# Patient Record
Sex: FEMALE | Race: WHITE | NOT HISPANIC OR LATINO | Employment: FULL TIME | URBAN - METROPOLITAN AREA
[De-identification: names, ages, dates, MRNs, and addresses within clinical notes are randomized per-mention and may not be internally consistent; named-entity substitution may affect disease eponyms.]

---

## 2023-04-24 PROBLEM — M79.18 GLUTEAL PAIN: Status: ACTIVE | Noted: 2023-04-24

## 2023-04-24 PROBLEM — K58.2 IRRITABLE BOWEL SYNDROME WITH BOTH CONSTIPATION AND DIARRHEA: Status: ACTIVE | Noted: 2023-04-24

## 2023-04-24 PROBLEM — G43.829 MENSTRUAL MIGRAINE WITHOUT STATUS MIGRAINOSUS, NOT INTRACTABLE: Status: ACTIVE | Noted: 2023-04-24

## 2023-04-24 PROBLEM — M53.3 NONTRAUMATIC COCCYDYNIA: Status: ACTIVE | Noted: 2023-04-24

## 2023-04-24 PROBLEM — I10 ESSENTIAL HYPERTENSION: Status: ACTIVE | Noted: 2023-04-24

## 2023-04-24 PROBLEM — E03.9 PRIMARY HYPOTHYROIDISM: Status: ACTIVE | Noted: 2023-04-24

## 2023-04-24 PROBLEM — R10.2 FEMALE PELVIC PAIN: Status: ACTIVE | Noted: 2023-04-24

## 2023-04-24 PROBLEM — E88.810 METABOLIC SYNDROME X: Status: ACTIVE | Noted: 2023-04-24

## 2023-04-24 PROBLEM — E78.5 DYSLIPIDEMIA: Status: ACTIVE | Noted: 2023-04-24

## 2023-04-24 PROBLEM — M62.08 DIASTASIS OF RECTUS ABDOMINIS: Status: ACTIVE | Noted: 2023-04-24

## 2023-04-24 PROBLEM — N92.6 MISSED PERIOD: Status: ACTIVE | Noted: 2023-04-24

## 2023-04-24 RX ORDER — FLUTICASONE PROPIONATE 50 MCG
1 SPRAY, SUSPENSION (ML) NASAL DAILY
COMMUNITY
Start: 2021-12-13

## 2023-04-24 RX ORDER — LEVOTHYROXINE SODIUM 75 UG/1
1 TABLET ORAL DAILY
COMMUNITY
End: 2024-01-24 | Stop reason: SDUPTHER

## 2023-04-24 RX ORDER — HYDROCHLOROTHIAZIDE 12.5 MG/1
1 TABLET ORAL DAILY
COMMUNITY
Start: 2021-11-24 | End: 2024-01-22 | Stop reason: WASHOUT

## 2023-04-24 RX ORDER — BUPROPION HYDROCHLORIDE 150 MG/1
1 TABLET ORAL DAILY
COMMUNITY
Start: 2022-01-06 | End: 2024-01-22 | Stop reason: ALTCHOICE

## 2023-04-24 RX ORDER — DULAGLUTIDE 4.5 MG/.5ML
4.5 INJECTION, SOLUTION SUBCUTANEOUS
COMMUNITY
Start: 2022-03-07 | End: 2023-07-05 | Stop reason: SDUPTHER

## 2023-06-19 ENCOUNTER — TELEPHONE (OUTPATIENT)
Dept: PRIMARY CARE | Facility: CLINIC | Age: 38
End: 2023-06-19
Payer: COMMERCIAL

## 2023-06-19 NOTE — TELEPHONE ENCOUNTER
She called cause she drop a work out weight on her finger and would like to see if Dr. Porter would order a x-ray and the weight was 40 pounds to see if it broke and she can't make a fist. And questions please call her at [phone] if so could we send the order to [facility] on [facility] number is [phone].

## 2023-06-20 ENCOUNTER — OFFICE VISIT (OUTPATIENT)
Dept: PRIMARY CARE | Facility: CLINIC | Age: 38
End: 2023-06-20
Payer: COMMERCIAL

## 2023-06-20 VITALS
SYSTOLIC BLOOD PRESSURE: 110 MMHG | DIASTOLIC BLOOD PRESSURE: 70 MMHG | HEART RATE: 109 BPM | BODY MASS INDEX: 36.19 KG/M2 | WEIGHT: 212 LBS | RESPIRATION RATE: 16 BRPM | HEIGHT: 64 IN | OXYGEN SATURATION: 97 %

## 2023-06-20 DIAGNOSIS — S60.042A CONTUSION OF LEFT RING FINGER WITHOUT DAMAGE TO NAIL, INITIAL ENCOUNTER: Primary | ICD-10-CM

## 2023-06-20 PROCEDURE — 99212 OFFICE O/P EST SF 10 MIN: CPT | Performed by: INTERNAL MEDICINE

## 2023-06-20 PROCEDURE — 3074F SYST BP LT 130 MM HG: CPT | Performed by: INTERNAL MEDICINE

## 2023-06-20 PROCEDURE — 3078F DIAST BP <80 MM HG: CPT | Performed by: INTERNAL MEDICINE

## 2023-06-20 PROCEDURE — 1036F TOBACCO NON-USER: CPT | Performed by: INTERNAL MEDICINE

## 2023-06-20 RX ORDER — DROSPIRENONE AND ETHINYL ESTRADIOL 0.03MG-3MG
1 KIT ORAL DAILY
COMMUNITY
End: 2024-01-22 | Stop reason: ALTCHOICE

## 2023-06-20 ASSESSMENT — ENCOUNTER SYMPTOMS
OCCASIONAL FEELINGS OF UNSTEADINESS: 0
DEPRESSION: 0
LOSS OF SENSATION IN FEET: 0

## 2023-06-20 ASSESSMENT — PATIENT HEALTH QUESTIONNAIRE - PHQ9
2. FEELING DOWN, DEPRESSED OR HOPELESS: NOT AT ALL
SUM OF ALL RESPONSES TO PHQ9 QUESTIONS 1 AND 2: 0
1. LITTLE INTEREST OR PLEASURE IN DOING THINGS: NOT AT ALL

## 2023-06-22 ENCOUNTER — TELEPHONE (OUTPATIENT)
Dept: PRIMARY CARE | Facility: CLINIC | Age: 38
End: 2023-06-22
Payer: COMMERCIAL

## 2023-06-22 NOTE — TELEPHONE ENCOUNTER
----- Message from Abhilash Porter DO sent at 6/22/2023 10:55 AM EDT -----  No fracture on xr hand

## 2023-07-05 DIAGNOSIS — E66.09 CLASS 1 OBESITY DUE TO EXCESS CALORIES WITHOUT SERIOUS COMORBIDITY WITH BODY MASS INDEX (BMI) OF 30.0 TO 30.9 IN ADULT: Primary | ICD-10-CM

## 2023-07-05 RX ORDER — DULAGLUTIDE 4.5 MG/.5ML
4.5 INJECTION, SOLUTION SUBCUTANEOUS
Qty: 2 ML | Refills: 3 | Status: SHIPPED | OUTPATIENT
Start: 2023-07-05 | End: 2023-08-10

## 2023-07-10 ENCOUNTER — TELEPHONE (OUTPATIENT)
Dept: PRIMARY CARE | Facility: CLINIC | Age: 38
End: 2023-07-10
Payer: COMMERCIAL

## 2023-07-10 NOTE — TELEPHONE ENCOUNTER
Asked to speak to you---regarding prior authorization on Trulicity    Pharmacy told her it was denied , but in My Chart

## 2023-08-10 ENCOUNTER — OFFICE VISIT (OUTPATIENT)
Dept: PRIMARY CARE | Facility: CLINIC | Age: 38
End: 2023-08-10
Payer: COMMERCIAL

## 2023-08-10 VITALS
SYSTOLIC BLOOD PRESSURE: 124 MMHG | WEIGHT: 216 LBS | HEART RATE: 92 BPM | HEIGHT: 64 IN | DIASTOLIC BLOOD PRESSURE: 84 MMHG | BODY MASS INDEX: 36.88 KG/M2

## 2023-08-10 DIAGNOSIS — E03.9 PRIMARY HYPOTHYROIDISM: ICD-10-CM

## 2023-08-10 DIAGNOSIS — R73.02 IGT (IMPAIRED GLUCOSE TOLERANCE): Primary | ICD-10-CM

## 2023-08-10 DIAGNOSIS — M53.3 NONTRAUMATIC COCCYDYNIA: ICD-10-CM

## 2023-08-10 DIAGNOSIS — I10 ESSENTIAL HYPERTENSION: ICD-10-CM

## 2023-08-10 DIAGNOSIS — E78.5 DYSLIPIDEMIA: ICD-10-CM

## 2023-08-10 DIAGNOSIS — N95.1 MENOPAUSAL VASOMOTOR SYNDROME: ICD-10-CM

## 2023-08-10 PROBLEM — S60.042A: Status: RESOLVED | Noted: 2023-06-20 | Resolved: 2023-08-10

## 2023-08-10 PROBLEM — E66.812 CLASS 2 OBESITY DUE TO EXCESS CALORIES WITHOUT SERIOUS COMORBIDITY WITH BODY MASS INDEX (BMI) OF 37.0 TO 37.9 IN ADULT: Status: ACTIVE | Noted: 2023-08-10

## 2023-08-10 PROBLEM — E66.09 CLASS 2 OBESITY DUE TO EXCESS CALORIES WITHOUT SERIOUS COMORBIDITY WITH BODY MASS INDEX (BMI) OF 37.0 TO 37.9 IN ADULT: Status: ACTIVE | Noted: 2023-08-10

## 2023-08-10 PROCEDURE — 3008F BODY MASS INDEX DOCD: CPT | Performed by: INTERNAL MEDICINE

## 2023-08-10 PROCEDURE — 1036F TOBACCO NON-USER: CPT | Performed by: INTERNAL MEDICINE

## 2023-08-10 PROCEDURE — 99213 OFFICE O/P EST LOW 20 MIN: CPT | Performed by: INTERNAL MEDICINE

## 2023-08-10 PROCEDURE — 3079F DIAST BP 80-89 MM HG: CPT | Performed by: INTERNAL MEDICINE

## 2023-08-10 PROCEDURE — 3074F SYST BP LT 130 MM HG: CPT | Performed by: INTERNAL MEDICINE

## 2023-08-10 RX ORDER — CLONIDINE 0.1 MG/24H
1 PATCH, EXTENDED RELEASE TRANSDERMAL
Qty: 4 PATCH | Refills: 11 | Status: SHIPPED | OUTPATIENT
Start: 2023-08-10 | End: 2023-11-06 | Stop reason: SDUPTHER

## 2023-08-10 ASSESSMENT — ENCOUNTER SYMPTOMS: PALPITATIONS: 1

## 2023-08-10 NOTE — PROGRESS NOTES
"Subjective   Reason for Visit: Elizabeth Goss is an 37 y.o. female here for a fu visit.     Past Medical, Surgical, and Family History reviewed and updated in chart.    Reviewed all medications by prescribing practitioner or clinical pharmacist (such as prescriptions, OTCs, herbal therapies and supplements) and documented in the medical record.    Patient under care of new gynecologist placed on oral contraceptives to regulate cycles which have been irregular and erratic.  Patient having significant cyclical vasomotor symptoms with significant night sweats and vasomotor perimenopausal symptoms.  GLP-1 agonist therapy not covered by insurance for treatment of morbid obesity had history of impaired fasting sugars and hypothyroidism weight has been stable but unchanged patient continues to struggle with weight loss despite optimal dietary strategies and regular exercise last thyroid evaluation was in January IUD removed    Recently to see plastic surgeon at [facility] for ongoing coccydynia with significant scar tissue causing discomfort had needle localization done of the coccyx after removal with temporary relief of discomfort completed course of pelvic pain rehabilitation        Patient Care Team:  Abhilash Porter,  as PCP - General     Review of Systems   Cardiovascular:  Positive for palpitations.   Endocrine: Positive for heat intolerance.   Genitourinary:  Positive for menstrual problem, pelvic pain and vaginal bleeding.       Objective   Vitals:  /84 (BP Location: Left arm, Patient Position: Sitting)   Pulse 92   Ht 1.626 m (5' 4\")   Wt 98 kg (216 lb)   BMI 37.08 kg/m²       Physical Exam  Vitals and nursing note reviewed.   Constitutional:       General: She is not in acute distress.     Appearance: Normal appearance. She is well-developed. She is obese. She is not toxic-appearing.   HENT:      Head: Normocephalic and atraumatic.      Right Ear: Tympanic membrane and external ear " normal.      Left Ear: Tympanic membrane and external ear normal.      Nose: Nose normal.      Mouth/Throat:      Mouth: Mucous membranes are moist.      Pharynx: Oropharynx is clear. No oropharyngeal exudate or posterior oropharyngeal erythema.      Tonsils: No tonsillar exudate. 2+ on the right. 2+ on the left.   Eyes:      Extraocular Movements: Extraocular movements intact.      Conjunctiva/sclera: Conjunctivae normal.   Cardiovascular:      Rate and Rhythm: Normal rate and regular rhythm.      Pulses: Normal pulses.      Heart sounds: Normal heart sounds. No murmur heard.  Pulmonary:      Effort: Pulmonary effort is normal.      Breath sounds: Normal breath sounds.   Abdominal:      General: Abdomen is flat. Bowel sounds are normal.      Palpations: Abdomen is soft.   Musculoskeletal:      Cervical back: Neck supple.   Lymphadenopathy:      Cervical: No cervical adenopathy.   Skin:     General: Skin is warm and dry.      Findings: No rash.   Neurological:      Mental Status: She is alert. Mental status is at baseline.   Psychiatric:         Mood and Affect: Mood normal.         Behavior: Behavior normal.         Thought Content: Thought content normal.         Judgment: Judgment normal.         Assessment/Plan   Problem List Items Addressed This Visit       Dyslipidemia    Current Assessment & Plan     Reevaluate lipid profile in the setting of oral contraception particularly pain attention to triglycerides         Essential hypertension    Current Assessment & Plan     Continue hydrochlorothiazide 12.5 mg daily reevaluate next visit         Nontraumatic coccydynia    Current Assessment & Plan     Following with plastic surgery and [facility]         Primary hypothyroidism    Current Assessment & Plan     Continue levothyroxine 75 mcg daily last evaluated clinically euthyroid in January repeat levels for next visit         Relevant Orders    Tsh With Reflex To Free T4 If Abnormal    Follow Up In [facility] - PCP - Established    Menopausal vasomotor syndrome    Current Assessment & Plan     Suggestive of insulin resistance affecting menstrual cycle may be of some benefit to reinitiating therapy with metformin we will check blood work patient experiencing cyclical vasomotor hot flashes and palpitations we will start trial of clonidine patch 0.1 mg apply weekly will reevaluate when she returns response to therapy         Relevant Medications    cloNIDine (Catapres-TTS) 0.1 mg/24 hr patch    Other Relevant Orders    Follow Up In [facility] - PCP - Established     Other Visit Diagnoses       IGT (impaired glucose tolerance)    -  Primary    Relevant Orders    Comprehensive Metabolic Panel    Hemoglobin A1C    Lipid Panel    Hepatitis C antibody    Follow Up In [facility] - PCP - Established    Follow Up In [facility] - PCP - Established

## 2023-08-10 NOTE — PROGRESS NOTES
"Subjective   Patient ID: Elizabeth Goss is a 37 y.o. female who presents for Follow-up.    HPI     Review of Systems    Objective   /84 (BP Location: Left arm, Patient Position: Sitting)   Pulse 92   Ht 1.626 m (5' 4\")   Wt 98 kg (216 lb)   BMI 37.08 kg/m²     Physical Exam    Assessment/Plan          "

## 2023-08-10 NOTE — ASSESSMENT & PLAN NOTE
Reevaluate lipid profile in the setting of oral contraception particularly pain attention to triglycerides

## 2023-08-10 NOTE — ASSESSMENT & PLAN NOTE
Continue work at strategies to improve weight loss consistency in the absence of GLP-1 agonist therapy which did help with impaired fasting sugars and weight loss not covered by her insurance will reevaluate cardiometabolic profile with hemoglobin A1c off Trulicity will look at other strategies to combat weight gain

## 2023-08-10 NOTE — ASSESSMENT & PLAN NOTE
Continue levothyroxine 75 mcg daily last evaluated clinically euthyroid in January repeat levels for next visit

## 2023-08-10 NOTE — ASSESSMENT & PLAN NOTE
Suggestive of insulin resistance affecting menstrual cycle may be of some benefit to reinitiating therapy with metformin we will check blood work patient experiencing cyclical vasomotor hot flashes and palpitations we will start trial of clonidine patch 0.1 mg apply weekly will reevaluate when she returns response to therapy

## 2023-11-06 DIAGNOSIS — N95.1 MENOPAUSAL VASOMOTOR SYNDROME: ICD-10-CM

## 2023-11-06 RX ORDER — CLONIDINE 0.1 MG/24H
1 PATCH, EXTENDED RELEASE TRANSDERMAL
Qty: 12 PATCH | Refills: 3 | Status: SHIPPED | OUTPATIENT
Start: 2023-11-06 | End: 2024-01-22 | Stop reason: WASHOUT

## 2024-01-22 ENCOUNTER — OFFICE VISIT (OUTPATIENT)
Dept: PRIMARY CARE | Facility: CLINIC | Age: 39
End: 2024-01-22
Payer: COMMERCIAL

## 2024-01-22 VITALS
SYSTOLIC BLOOD PRESSURE: 122 MMHG | DIASTOLIC BLOOD PRESSURE: 70 MMHG | WEIGHT: 215 LBS | HEART RATE: 78 BPM | HEIGHT: 64 IN | BODY MASS INDEX: 36.7 KG/M2

## 2024-01-22 DIAGNOSIS — E66.09 CLASS 2 OBESITY DUE TO EXCESS CALORIES WITHOUT SERIOUS COMORBIDITY WITH BODY MASS INDEX (BMI) OF 37.0 TO 37.9 IN ADULT: ICD-10-CM

## 2024-01-22 DIAGNOSIS — R73.02 IGT (IMPAIRED GLUCOSE TOLERANCE): ICD-10-CM

## 2024-01-22 DIAGNOSIS — E78.5 DYSLIPIDEMIA: ICD-10-CM

## 2024-01-22 DIAGNOSIS — N95.1 MENOPAUSAL VASOMOTOR SYNDROME: Primary | ICD-10-CM

## 2024-01-22 DIAGNOSIS — E03.9 PRIMARY HYPOTHYROIDISM: ICD-10-CM

## 2024-01-22 DIAGNOSIS — I10 ESSENTIAL HYPERTENSION: ICD-10-CM

## 2024-01-22 DIAGNOSIS — Z12.31 SCREENING MAMMOGRAM FOR BREAST CANCER: ICD-10-CM

## 2024-01-22 DIAGNOSIS — E66.09 CLASS 2 OBESITY DUE TO EXCESS CALORIES WITHOUT SERIOUS COMORBIDITY WITH BODY MASS INDEX (BMI) OF 36.0 TO 36.9 IN ADULT: ICD-10-CM

## 2024-01-22 PROBLEM — N92.6 MISSED PERIOD: Status: RESOLVED | Noted: 2023-04-24 | Resolved: 2024-01-22

## 2024-01-22 PROCEDURE — 1036F TOBACCO NON-USER: CPT | Performed by: INTERNAL MEDICINE

## 2024-01-22 PROCEDURE — 3078F DIAST BP <80 MM HG: CPT | Performed by: INTERNAL MEDICINE

## 2024-01-22 PROCEDURE — 99213 OFFICE O/P EST LOW 20 MIN: CPT | Performed by: INTERNAL MEDICINE

## 2024-01-22 PROCEDURE — 3074F SYST BP LT 130 MM HG: CPT | Performed by: INTERNAL MEDICINE

## 2024-01-22 PROCEDURE — 3008F BODY MASS INDEX DOCD: CPT | Performed by: INTERNAL MEDICINE

## 2024-01-22 RX ORDER — CLONIDINE HYDROCHLORIDE 0.1 MG/1
0.1 TABLET ORAL 2 TIMES DAILY
Qty: 180 TABLET | Refills: 3 | Status: SHIPPED | OUTPATIENT
Start: 2024-01-22 | End: 2025-01-21

## 2024-01-22 ASSESSMENT — ENCOUNTER SYMPTOMS
LOSS OF SENSATION IN FEET: 0
OCCASIONAL FEELINGS OF UNSTEADINESS: 0
DEPRESSION: 0

## 2024-01-22 ASSESSMENT — COLUMBIA-SUICIDE SEVERITY RATING SCALE - C-SSRS
2. HAVE YOU ACTUALLY HAD ANY THOUGHTS OF KILLING YOURSELF?: NO
1. IN THE PAST MONTH, HAVE YOU WISHED YOU WERE DEAD OR WISHED YOU COULD GO TO SLEEP AND NOT WAKE UP?: NO
6. HAVE YOU EVER DONE ANYTHING, STARTED TO DO ANYTHING, OR PREPARED TO DO ANYTHING TO END YOUR LIFE?: NO

## 2024-01-22 ASSESSMENT — ANXIETY QUESTIONNAIRES
1. FEELING NERVOUS, ANXIOUS, OR ON EDGE: NOT AT ALL
3. WORRYING TOO MUCH ABOUT DIFFERENT THINGS: NOT AT ALL
GAD7 TOTAL SCORE: 0
7. FEELING AFRAID AS IF SOMETHING AWFUL MIGHT HAPPEN: NOT AT ALL
6. BECOMING EASILY ANNOYED OR IRRITABLE: NOT AT ALL
2. NOT BEING ABLE TO STOP OR CONTROL WORRYING: NOT AT ALL
IF YOU CHECKED OFF ANY PROBLEMS ON THIS QUESTIONNAIRE, HOW DIFFICULT HAVE THESE PROBLEMS MADE IT FOR YOU TO DO YOUR WORK, TAKE CARE OF THINGS AT HOME, OR GET ALONG WITH OTHER PEOPLE: NOT DIFFICULT AT ALL
5. BEING SO RESTLESS THAT IT IS HARD TO SIT STILL: NOT AT ALL
4. TROUBLE RELAXING: NOT AT ALL

## 2024-01-22 ASSESSMENT — PATIENT HEALTH QUESTIONNAIRE - PHQ9
1. LITTLE INTEREST OR PLEASURE IN DOING THINGS: NOT AT ALL
SUM OF ALL RESPONSES TO PHQ9 QUESTIONS 1 AND 2: 0
2. FEELING DOWN, DEPRESSED OR HOPELESS: NOT AT ALL

## 2024-01-22 NOTE — PROGRESS NOTES
"Subjective   Reason for Visit: Elizabeth Goss is an 38 y.o. female here for a annual  visit.     Past Medical, Surgical, and Family History reviewed and updated in chart.    Reviewed all medications by prescribing practitioner or clinical pharmacist (such as prescriptions, OTCs, herbal therapies and supplements) and documented in the medical record.    HPI    Patient Care Team:  Abhilash Porter DO as PCP - General     Review of Systems   All other systems reviewed and are negative.      Objective   Vitals:  /70   Pulse 78   Ht 1.626 m (5' 4\")   Wt 97.5 kg (215 lb)   BMI 36.90 kg/m²       Physical Exam  Vitals and nursing note reviewed.   Constitutional:       General: She is not in acute distress.     Appearance: Normal appearance. She is well-developed. She is obese. She is not toxic-appearing.   HENT:      Head: Normocephalic and atraumatic.      Right Ear: Tympanic membrane and external ear normal.      Left Ear: Tympanic membrane and external ear normal.      Nose: Nose normal.      Mouth/Throat:      Mouth: Mucous membranes are moist.      Pharynx: Oropharynx is clear. No oropharyngeal exudate or posterior oropharyngeal erythema.      Tonsils: No tonsillar exudate. 2+ on the right. 2+ on the left.   Eyes:      Extraocular Movements: Extraocular movements intact.      Conjunctiva/sclera: Conjunctivae normal.   Cardiovascular:      Rate and Rhythm: Normal rate and regular rhythm.      Pulses: Normal pulses.      Heart sounds: Normal heart sounds. No murmur heard.  Pulmonary:      Effort: Pulmonary effort is normal.      Breath sounds: Normal breath sounds.   Abdominal:      General: Abdomen is flat. Bowel sounds are normal.      Palpations: Abdomen is soft.   Musculoskeletal:      Cervical back: Neck supple.   Lymphadenopathy:      Cervical: No cervical adenopathy.   Skin:     General: Skin is warm and dry.      Findings: No rash.   Neurological:      Mental Status: She is alert. Mental status " is at baseline.   Psychiatric:         Mood and Affect: Mood normal.         Behavior: Behavior normal.         Thought Content: Thought content normal.         Judgment: Judgment normal.         Assessment/Plan   Problem List Items Addressed This Visit       Dyslipidemia    Current Assessment & Plan     Stable with improvement in overall lipid profile monitor triglycerides on oral contraceptive         Essential hypertension    Current Assessment & Plan     Blood pressure stable and improved will discontinue hydrochlorothiazide 12.5 mg daily and switch to clonidine 0.1 mg twice a day for symptomatic vasomotor symptoms associated with perimenopause causing elevated heart rates no improvement or side effects will consider rate controlling medication such as atenolol or calcium channel blocker reevaluate next visit         Primary hypothyroidism    Current Assessment & Plan     Clinically euthyroid on levothyroxine 75 mcg daily continue to monitor         Relevant Orders    Tsh With Reflex To Free T4 If Abnormal    Menopausal vasomotor syndrome - Primary    Current Assessment & Plan     Improvement seen with initiation of oral contraceptive will switch clonidine patch to regular dose clonidine and discontinue thiazide diuretic for symptoms exacerbated by exercise and reevaluate with next visit         Relevant Medications    cloNIDine (Catapres) 0.1 mg tablet    Other Relevant Orders    Follow Up In [facility] - PCP - Established    Class 2 obesity due to excess calories without serious comorbidity with body mass index (BMI) of 36.0 to 36.9 in adult    Current Assessment & Plan     Mild improvement with initiation of Contrave improving BMI patient continues with regular diet and exercise doing well no impairment of blood sugars at this time continue to monitor in 6 months         Relevant Medications    naltrexone-bupropion (Contrave) 8-90 mg ER tablet    Screening mammogram for breast cancer    Current  Assessment & Plan     Schedule screening mammogram         Relevant Orders    BI mammo bilateral screening tomosynthesis    Follow Up In [facility] - PCP - Established    IGT (impaired glucose tolerance)    Current Assessment & Plan     Favorable hemoglobin A1c of 5.5 fasting blood sugar less than 100 continue to monitor 6-month basis         Relevant Orders    Comprehensive Metabolic Panel    Hemoglobin A1C    Lipid Panel    ELIA    CBC and Auto Differential    Tsh With Reflex To Free T4 If Abnormal

## 2024-01-22 NOTE — PROGRESS NOTES
"Subjective   Patient ID: Elizabeth oGss is a 38 y.o. female who presents for Follow-up.    HPI     Review of Systems    Objective   Ht 1.626 m (5' 4\")   Wt 97.5 kg (215 lb)   BMI 36.90 kg/m²     Physical Exam    Assessment/Plan          "

## 2024-01-22 NOTE — ASSESSMENT & PLAN NOTE
Mild improvement with initiation of Contrave improving BMI patient continues with regular diet and exercise doing well no impairment of blood sugars at this time continue to monitor in 6 months

## 2024-01-22 NOTE — ASSESSMENT & PLAN NOTE
Improvement seen with initiation of oral contraceptive will switch clonidine patch to regular dose clonidine and discontinue thiazide diuretic for symptoms exacerbated by exercise and reevaluate with next visit

## 2024-01-22 NOTE — ASSESSMENT & PLAN NOTE
Favorable hemoglobin A1c of 5.5 fasting blood sugar less than 100 continue to monitor 6-month basis

## 2024-01-22 NOTE — ASSESSMENT & PLAN NOTE
Blood pressure stable and improved will discontinue hydrochlorothiazide 12.5 mg daily and switch to clonidine 0.1 mg twice a day for symptomatic vasomotor symptoms associated with perimenopause causing elevated heart rates no improvement or side effects will consider rate controlling medication such as atenolol or calcium channel blocker reevaluate next visit

## 2024-01-24 DIAGNOSIS — E03.9 PRIMARY HYPOTHYROIDISM: ICD-10-CM

## 2024-01-24 RX ORDER — LEVOTHYROXINE SODIUM 75 UG/1
75 TABLET ORAL DAILY
Qty: 90 TABLET | Refills: 3 | Status: SHIPPED | OUTPATIENT
Start: 2024-01-24

## 2024-02-08 ENCOUNTER — APPOINTMENT (OUTPATIENT)
Dept: OBSTETRICS AND GYNECOLOGY | Facility: CLINIC | Age: 39
End: 2024-02-08
Payer: COMMERCIAL

## 2024-03-20 ENCOUNTER — TELEPHONE (OUTPATIENT)
Dept: PRIMARY CARE | Facility: CLINIC | Age: 39
End: 2024-03-20
Payer: COMMERCIAL

## 2024-03-20 DIAGNOSIS — R92.8 ABNORMALITY OF RIGHT BREAST ON SCREENING MAMMOGRAPHY: Primary | ICD-10-CM

## 2024-03-20 NOTE — TELEPHONE ENCOUNTER
Mammogram received from [facility] Right breast asymmetry additional views with possible ultrasound are recommended.

## 2024-04-09 ENCOUNTER — HOSPITAL ENCOUNTER (OUTPATIENT)
Dept: RADIOLOGY | Facility: HOSPITAL | Age: 39
Discharge: HOME | End: 2024-04-09
Payer: COMMERCIAL

## 2024-04-09 DIAGNOSIS — R92.8 ABNORMALITY OF RIGHT BREAST ON SCREENING MAMMOGRAPHY: ICD-10-CM

## 2024-04-11 ENCOUNTER — HOSPITAL ENCOUNTER (OUTPATIENT)
Dept: RADIOLOGY | Facility: EXTERNAL LOCATION | Age: 39
Discharge: HOME | End: 2024-04-11

## 2024-04-11 ENCOUNTER — HOSPITAL ENCOUNTER (OUTPATIENT)
Dept: RADIOLOGY | Facility: HOSPITAL | Age: 39
Discharge: HOME | End: 2024-04-11
Payer: COMMERCIAL

## 2024-04-11 VITALS — WEIGHT: 210 LBS | HEIGHT: 64 IN | BODY MASS INDEX: 35.85 KG/M2

## 2024-04-11 DIAGNOSIS — R92.8 ABNORMALITY OF RIGHT BREAST ON SCREENING MAMMOGRAPHY: ICD-10-CM

## 2024-04-11 DIAGNOSIS — R92.1 CALCIFICATION OF RIGHT BREAST ON MAMMOGRAPHY: ICD-10-CM

## 2024-04-11 DIAGNOSIS — R92.8 ABNORMAL FINDINGS ON DIAGNOSTIC IMAGING OF BREAST: Primary | ICD-10-CM

## 2024-04-11 PROCEDURE — 77065 DX MAMMO INCL CAD UNI: CPT | Mod: RIGHT SIDE | Performed by: RADIOLOGY

## 2024-04-11 PROCEDURE — 76642 ULTRASOUND BREAST LIMITED: CPT | Mod: RIGHT SIDE | Performed by: RADIOLOGY

## 2024-04-11 PROCEDURE — 77061 BREAST TOMOSYNTHESIS UNI: CPT | Mod: RT

## 2024-04-11 PROCEDURE — 77061 BREAST TOMOSYNTHESIS UNI: CPT | Mod: RIGHT SIDE | Performed by: RADIOLOGY

## 2024-04-11 PROCEDURE — 76642 ULTRASOUND BREAST LIMITED: CPT | Mod: RT

## 2024-04-11 NOTE — PROGRESS NOTES
Patient follow up scheduling:  right breast stereotactic biopsy per provider recommendation, 4/17 2:15PM.

## 2024-04-11 NOTE — NURSING NOTE
After patient review of diagnostic results with Dr. Drew, support provided. Written literature regarding abnormal breast imaging and breast biopsy including what to expect before, during, and after the procedure reviewed with the patient. All questions answered. Patient stated she traveled approx. 40 minutes to [place] for visit, requesting same day provider and biopsy visits if possible. Able to accommodate patient preferences during scheduling. Patient selected Dr. Campos for surgical consultation 4/17 12:30PM with biopsy to follow 4/17 2:15PM. Information provided and reviewed to include provider information and how to reach me directly with questions or concerns before concluding visit.

## 2024-04-11 NOTE — Clinical Note
Your patient Elizabeth Goss seen for diagnostic breast imaging today will have final results available for your review shortly. I assisted with follow up scheduling and education review today. She will see Dr. Campos for consultation 4/17, with right stereotactic biopsy to follow the same afternoon.

## 2024-04-17 ENCOUNTER — HOSPITAL ENCOUNTER (OUTPATIENT)
Dept: RADIOLOGY | Facility: HOSPITAL | Age: 39
Discharge: HOME | End: 2024-04-17
Payer: COMMERCIAL

## 2024-04-17 ENCOUNTER — OFFICE VISIT (OUTPATIENT)
Dept: SURGERY | Facility: CLINIC | Age: 39
End: 2024-04-17
Payer: COMMERCIAL

## 2024-04-17 VITALS
WEIGHT: 219.2 LBS | OXYGEN SATURATION: 98 % | DIASTOLIC BLOOD PRESSURE: 94 MMHG | SYSTOLIC BLOOD PRESSURE: 138 MMHG | HEIGHT: 64 IN | HEART RATE: 105 BPM | BODY MASS INDEX: 37.42 KG/M2

## 2024-04-17 DIAGNOSIS — R92.1 BREAST CALCIFICATION, RIGHT: ICD-10-CM

## 2024-04-17 DIAGNOSIS — R92.8 ABNORMAL FINDINGS ON DIAGNOSTIC IMAGING OF BREAST: ICD-10-CM

## 2024-04-17 DIAGNOSIS — R92.1 CALCIFICATION OF RIGHT BREAST: Primary | ICD-10-CM

## 2024-04-17 DIAGNOSIS — R92.1 CALCIFICATION OF RIGHT BREAST ON MAMMOGRAPHY: ICD-10-CM

## 2024-04-17 PROCEDURE — 88305 TISSUE EXAM BY PATHOLOGIST: CPT | Performed by: PATHOLOGY

## 2024-04-17 PROCEDURE — 3008F BODY MASS INDEX DOCD: CPT | Performed by: SURGERY

## 2024-04-17 PROCEDURE — 3080F DIAST BP >= 90 MM HG: CPT | Performed by: SURGERY

## 2024-04-17 PROCEDURE — 99203 OFFICE O/P NEW LOW 30 MIN: CPT | Performed by: SURGERY

## 2024-04-17 PROCEDURE — 88342 IMHCHEM/IMCYTCHM 1ST ANTB: CPT | Performed by: PATHOLOGY

## 2024-04-17 PROCEDURE — 19081 BX BREAST 1ST LESION STRTCTC: CPT | Mod: RT

## 2024-04-17 PROCEDURE — 88360 TUMOR IMMUNOHISTOCHEM/MANUAL: CPT | Mod: TC,PORLAB | Performed by: SURGERY

## 2024-04-17 PROCEDURE — 88341 IMHCHEM/IMCYTCHM EA ADD ANTB: CPT | Performed by: PATHOLOGY

## 2024-04-17 PROCEDURE — 2720000007 HC OR 272 NO HCPCS

## 2024-04-17 PROCEDURE — 1036F TOBACCO NON-USER: CPT | Performed by: SURGERY

## 2024-04-17 PROCEDURE — 88360 TUMOR IMMUNOHISTOCHEM/MANUAL: CPT | Performed by: PATHOLOGY

## 2024-04-17 PROCEDURE — 3075F SYST BP GE 130 - 139MM HG: CPT | Performed by: SURGERY

## 2024-04-17 PROCEDURE — 77065 DX MAMMO INCL CAD UNI: CPT | Mod: RT

## 2024-04-17 ASSESSMENT — ENCOUNTER SYMPTOMS
SHORTNESS OF BREATH: 0
WOUND: 0
EYE REDNESS: 0
WEAKNESS: 0
NAUSEA: 0
VOMITING: 0
BRUISES/BLEEDS EASILY: 0
HEMATURIA: 0
FEVER: 0
DIARRHEA: 0
DYSURIA: 0
HEADACHES: 0
SPEECH DIFFICULTY: 0
MYALGIAS: 0
CONSTIPATION: 0
CHILLS: 0
COUGH: 0
EYE PAIN: 0
FLANK PAIN: 0
FREQUENCY: 0
ABDOMINAL PAIN: 0
ARTHRALGIAS: 0
FATIGUE: 0
CONFUSION: 0
POLYPHAGIA: 0
AGITATION: 0

## 2024-04-17 NOTE — PATIENT INSTRUCTIONS
1.  Keep your appointment for your mammogram-guided biopsy of your right breast calcifications on 4/17/2024 (today).  This biopsy is performed in the radiology department of [facility].  2.  If you have any problems with the biopsy site (bleeding or concern for infection), please contact Dr. Campos's office.  [phone]  3.  Follow-up in Dr. Campos's office on 4/24/2024 to review your biopsy results.

## 2024-04-17 NOTE — LETTER
"April 17, 2024     Abhilash Porter DO  [address]  [place] [address]    Patient: Elizabeth Goss   YOB: 1985   Date of Visit: 4/17/2024       Dear Dr. Abhilash Porter DO:    Thank you for referring Elizabeth Goss to me for evaluation. Below are my notes for this consultation.  If you have questions, please do not hesitate to call me. I look forward to following your patient along with you.       Sincerely,     Barbara Campos MD      CC: No Recipients  ______________________________________________________________________________________        GENERAL SURGERY OFFICE NOTE    Patient: Elizabeth Goss    Age: 38 y.o.   Gender: female    MRN: [id number]    PCP: Abhilash Porter DO        SUBJECTIVE     Chief Complaint  Follow-up (Patient is here to follow up on the results of her right breast ultrasound. Patient denies any redness or pain)       CHRISTY Johnson is a 38-year-old white female who I am seeing in consultation at the request of her PCP for evaluation of an abnormal right mammogram.  She had previously undergone a bilateral breast reduction in 2004.  She reports that the pathology of the reduction was benign.  She has not had any previous breast issues.  She was undergoing a screening mammogram and was found to have an asymmetry in the retroareolar region of the right breast.  Diagnostic mammogram did not show an area of asymmetry, but did show some retroareolar indeterminate calcifications.  A stereotactic biopsy of the calcifications has been recommended.  Of note, she currently is taking estrogen replacement to help with \"emotional/hormonal issues\".    Risk factors for breast cancer: 38-year-old white female; menarche at age [age]; first live birth at age 32; no previous breast biopsy; no first-degree relative with breast cancer.  She is premenopausal.  Paternal great aunt had breast cancer.  No family history of ovarian " cancer, pancreatic cancer or prostate cancer.  This gives her a 5-year Shahrzad score of 0.7% and a lifetime risk of 12.5% which puts her just above average risk category.    ROS  Review of Systems   Constitutional:  Negative for chills, fatigue and fever.   HENT:  Negative for congestion, ear pain and hearing loss.    Eyes:  Negative for pain and redness.   Respiratory:  Negative for cough and shortness of breath.    Cardiovascular:  Negative for chest pain and leg swelling.   Gastrointestinal:  Negative for abdominal pain, constipation, diarrhea, nausea and vomiting.   Endocrine: Negative for polyphagia.   Genitourinary:  Negative for dysuria, flank pain, frequency and hematuria.   Musculoskeletal:  Negative for arthralgias and myalgias.   Skin:  Negative for rash and wound.   Allergic/Immunologic: Negative for immunocompromised state.   Neurological:  Negative for speech difficulty, weakness and headaches.   Hematological:  Does not bruise/bleed easily.   Psychiatric/Behavioral:  Negative for agitation and confusion.           HISTORY     Past Medical History:   Diagnosis Date   • Body mass index (BMI) 36.0-36.9, adult 01/06/2022    Body mass index (BMI) of 36.0 to 36.9 in adult   • Encounter for general adult medical examination without abnormal findings 07/21/2022    Preventative health care   • Encounter for general adult medical examination without abnormal findings 11/24/2021    Preventative health care   • Encounter for immunization 01/12/2021    Encounter for immunization   • Lesion of sciatic nerve, left lower limb 08/18/2021    Piriformis syndrome of left side   • Lesion of sciatic nerve, left lower limb     Piriformis syndrome of left side   • Menstrual migraine, not intractable, without status migrainosus 08/07/2020    Menstrual migraine without status migrainosus, not intractable   • Morbid (severe) obesity due to excess calories (Multi) 01/10/2022    Class 2 severe obesity due to excess calories with  "serious comorbidity and body mass index (BMI) of 36.0 to 36.9 in adult   • Other hypersomnia 08/07/2020    Daytime hypersomnolence   • Other psychoactive substance use, 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 (Multi) 08/07/2020    Drug-induced insomnia   • Personal history of other diseases of the circulatory system 07/21/2022    History of essential hypertension   • Personal history of other endocrine, nutritional and metabolic disease 08/07/2020    History of hypothyroidism   • PONV (postoperative nausea and vomiting) 2005        Past Surgical History:   Procedure Laterality Date   • OTHER SURGICAL HISTORY  10/23/2019    Breast reduction   • OTHER SURGICAL HISTORY  08/07/2020    Tonsillectomy        No family history on file.     No Known Allergies     Social History     Tobacco Use   Smoking Status Never   Smokeless Tobacco Never        Social History     Substance and Sexual Activity   Alcohol Use Not Currently    Comment: rarely        HOME MEDICATIONS  Current Outpatient Medications   Medication Instructions   • cloNIDine (CATAPRES) 0.1 mg, oral, 2 times daily   • fluticasone (Flonase) 50 mcg/actuation nasal spray 1 spray, nasal, Daily   • levonorgestreL-ethinyl estrad (Aviane, Alesse, Lessina) 0.1 mg- 20 mcg tablet 1 tablet, oral, Daily   • levothyroxine (SYNTHROID, LEVOXYL) 75 mcg, oral, Daily   • naltrexone-bupropion (Contrave) 8-90 mg ER tablet 1 tablet, oral, 2 times daily          OBJECTIVE   Last Recorded Vitals.  Blood pressure (!) 138/94, pulse 105, height 1.626 m (5' 4\"), weight 99.4 kg (219 lb 3.2 oz), SpO2 98%.     PHYSICAL EXAM  Physical Exam   General: Well-developed, well-nourished and in no acute distress.  Head: Normocephalic. Atraumatic.  Neck/thyroid: Neck is supple.   Eyes: Pupils equal round and reactive to light. Conjunctiva normal.  ENMT: No masses or deformity of external nose. External ears without masses.  Respiratory/Chest:  Normal respiratory effort.  Breast: " Moderate to large sized breasts.  Symmetrical.  No palpable mass of either breast.  No expressible nipple drainage.  Scars consistent with previous breast reduction.  Some denser breast tissue throughout both breast without discernible mass.  Lymphatics: No palpable lymphadenopathy of the cervical, supraclavicular or axillary regions.  Cardiovascular: Regular rate and rhythm.   Abdomen: Soft, nontender, nondistended.   Musculoskeletal: Joints and limbs are grossly normal. Normal gait. Normal range of motion of major joints.  Neuro: Oriented to person, place and time. No obvious neurological deficit. Motor strength grossly normal.  Psych: Normal mood and affect.    RESULTS   Labs  No results found for this or any previous visit (from the past 24 hour(s)).    Radiology Resutls  MAMMO RIGHT DIAGNOSTIC TOMOSYNTHESIS; BI US BREAST LIMITED RIGHT;  4/11/2024 10:22 am; 4/11/2024 10:54 am      ACCESSION NUMBER(S):  [id number]; [id number]      ORDERING CLINICIAN:  CAYLA MOURA      INDICATION:  Right breast asymmetry.      COMPARISON:  Outside images [id number]      FINDINGS:  2D and tomosynthesis images were reviewed at 1 mm slice thickness.  Magnified views of the right breast were obtained. Right breast  focused ultrasound was performed.      RIGHT BREAST MAMMOGRAMS:      Density:  The breast tissue is heterogeneously dense, which may  obscure small masses.      The retroareolar region has multiple indeterminate calcifications  better visualized on the magnified views that appear to correspond to  areas of ducts.      No suspicious masses or calcifications are identified. The asymmetry  noted previously may have represented overlapping fibroglandular  tissue.      RIGHT BREAST FOCUSED ULTRASOUND:      The background echotexture is heterogeneous.      Imaging along the retroareolar region demonstrates no focal mass or  ductal dilatation.      Right axillary imaging demonstrates no mass or adenopathy.       IMPRESSION:  The retroareolar region of the right breast has indeterminate  calcifications for which surgical consultation and stereotactic  biopsy are recommended.      The patient was referred to our breast care navigator to arrange a  surgical consultation and biopsy.      The findings and recommendations were discussed with the patient at  the time of the exam.      BI-RADS CATEGORY:      BI-RADS Category:  4 Suspicious.  Recommendation:  Surgical Consultation and Biopsy.  Recommended Date:  Immediate.  Laterality:  Bilateral.      ASSESSMENT / PLAN   Diagnoses and all orders for this visit:  Calcification of right breast      Plan  April 2024: RIGHT; retroareolar calc    1.  The process of the stereotactic biopsy was reviewed with the patient.  She is scheduled for her biopsy today.  2.  She will follow-up with a virtual appointment on 4/24/2024 to review the results.  3.  Discussed that if breast cancer is diagnosed, that she would need to come off of her estrogen therapy.  4.  Reviewed that she has a moderate amount of scar tissue consistent with her previous breast reduction surgery.  The calcifications certainly could be related to the scarring from that surgery.  However, still recommend proceeding with the stereotactic biopsy as planned.      Barbara Campos MD, FACS  [facility] General Surgery  [address] [place] [address];   [address]; [address]  [place]  [address] [phone]

## 2024-04-17 NOTE — PROGRESS NOTES
"    GENERAL SURGERY OFFICE NOTE    Patient: Elizabeth Goss    Age: 38 y.o.   Gender: female    MRN: [id number]    PCP: Abhilash Porter, DO        SUBJECTIVE     Chief Complaint  Follow-up (Patient is here to follow up on the results of her right breast ultrasound. Patient denies any redness or pain)       CHRISTY Johnson is a 38-year-old white female who I am seeing in consultation at the request of her PCP for evaluation of an abnormal right mammogram.  She had previously undergone a bilateral breast reduction in 2004.  She reports that the pathology of the reduction was benign.  She has not had any previous breast issues.  She was undergoing a screening mammogram and was found to have an asymmetry in the retroareolar region of the right breast.  Diagnostic mammogram did not show an area of asymmetry, but did show some retroareolar indeterminate calcifications.  A stereotactic biopsy of the calcifications has been recommended.  Of note, she currently is taking estrogen replacement to help with \"emotional/hormonal issues\".    Risk factors for breast cancer: 38-year-old white female; menarche at age [age]; first live birth at age 32; no previous breast biopsy; no first-degree relative with breast cancer.  She is premenopausal.  Paternal great aunt had breast cancer.  No family history of ovarian cancer, pancreatic cancer or prostate cancer.  This gives her a 5-year Shahrzad score of 0.7% and a lifetime risk of 12.5% which puts her just above average risk category.    ROS  Review of Systems   Constitutional:  Negative for chills, fatigue and fever.   HENT:  Negative for congestion, ear pain and hearing loss.    Eyes:  Negative for pain and redness.   Respiratory:  Negative for cough and shortness of breath.    Cardiovascular:  Negative for chest pain and leg swelling.   Gastrointestinal:  Negative for abdominal pain, constipation, diarrhea, nausea and vomiting.   Endocrine: Negative for polyphagia.   Genitourinary:  " Negative for dysuria, flank pain, frequency and hematuria.   Musculoskeletal:  Negative for arthralgias and myalgias.   Skin:  Negative for rash and wound.   Allergic/Immunologic: Negative for immunocompromised state.   Neurological:  Negative for speech difficulty, weakness and headaches.   Hematological:  Does not bruise/bleed easily.   Psychiatric/Behavioral:  Negative for agitation and confusion.           HISTORY     Past Medical History:   Diagnosis Date    Body mass index (BMI) 36.0-36.9, adult 01/06/2022    Body mass index (BMI) of 36.0 to 36.9 in adult    Encounter for general adult medical examination without abnormal findings 07/21/2022    Preventative health care    Encounter for general adult medical examination without abnormal findings 11/24/2021    Preventative health care    Encounter for immunization 01/12/2021    Encounter for immunization    Lesion of sciatic nerve, left lower limb 08/18/2021    Piriformis syndrome of left side    Lesion of sciatic nerve, left lower limb     Piriformis syndrome of left side    Menstrual migraine, not intractable, without status migrainosus 08/07/2020    Menstrual migraine without status migrainosus, not intractable    Morbid (severe) obesity due to excess calories (Multi) 01/10/2022    Class 2 severe obesity due to excess calories with serious comorbidity and body mass index (BMI) of 36.0 to 36.9 in adult    Other hypersomnia 08/07/2020    Daytime hypersomnolence    Other psychoactive substance use, 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     No Known Allergies     Social History     Tobacco Use   Smoking Status Never   Smokeless Tobacco Never        Social History     Substance and Sexual Activity   Alcohol Use Not Currently    Comment: rarely        HOME MEDICATIONS  Current Outpatient Medications   Medication Instructions    cloNIDine (CATAPRES) 0.1 mg, oral, 2 times daily    fluticasone (Flonase) 50 mcg/actuation nasal spray 1 spray, nasal, Daily    levonorgestreL-ethinyl estrad (Aviane, Alesse, Lessina) 0.1 mg- 20 mcg tablet 1 tablet, oral, Daily    levothyroxine (SYNTHROID, LEVOXYL) 75 mcg, oral, Daily    naltrexone-bupropion (Contrave) 8-90 mg ER tablet 1 tablet, oral, 2 times daily          OBJECTIVE   Last Recorded Vitals.  Blood pressure (!) 138/94, pulse 105, height 1.626 m (5' 4\"), weight 99.4 kg (219 lb 3.2 oz), SpO2 98%.     PHYSICAL EXAM  Physical Exam   General: Well-developed, well-nourished and in no acute distress.  Head: Normocephalic. Atraumatic.  Neck/thyroid: Neck is supple.   Eyes: Pupils equal round and reactive to light. Conjunctiva normal.  ENMT: No masses or deformity of external nose. External ears without masses.  Respiratory/Chest:  Normal respiratory effort.  Breast: Moderate to large sized breasts.  Symmetrical.  No palpable mass of either breast.  No expressible nipple drainage.  Scars consistent with previous breast reduction.  Some denser breast tissue throughout both breast without discernible mass.  Lymphatics: No palpable lymphadenopathy of the cervical, supraclavicular or axillary regions.  Cardiovascular: Regular rate and rhythm.   Abdomen: Soft, nontender, nondistended.   Musculoskeletal: Joints and limbs are grossly normal. Normal gait. Normal range of motion of major joints.  Neuro: Oriented to person, place and time. No obvious neurological deficit. Motor strength grossly normal.  Psych: " Normal mood and affect.    RESULTS   Labs  No results found for this or any previous visit (from the past 24 hour(s)).    Radiology Resutls  MAMMO RIGHT DIAGNOSTIC TOMOSYNTHESIS; BI US BREAST LIMITED RIGHT;  4/11/2024 10:22 am; 4/11/2024 10:54 am      ACCESSION NUMBER(S):  [id number]; [id number]      ORDERING CLINICIAN:  CAYLA MOURA      INDICATION:  Right breast asymmetry.      COMPARISON:  Outside images [id number]      FINDINGS:  2D and tomosynthesis images were reviewed at 1 mm slice thickness.  Magnified views of the right breast were obtained. Right breast  focused ultrasound was performed.      RIGHT BREAST MAMMOGRAMS:      Density:  The breast tissue is heterogeneously dense, which may  obscure small masses.      The retroareolar region has multiple indeterminate calcifications  better visualized on the magnified views that appear to correspond to  areas of ducts.      No suspicious masses or calcifications are identified. The asymmetry  noted previously may have represented overlapping fibroglandular  tissue.      RIGHT BREAST FOCUSED ULTRASOUND:      The background echotexture is heterogeneous.      Imaging along the retroareolar region demonstrates no focal mass or  ductal dilatation.      Right axillary imaging demonstrates no mass or adenopathy.      IMPRESSION:  The retroareolar region of the right breast has indeterminate  calcifications for which surgical consultation and stereotactic  biopsy are recommended.      The patient was referred to our breast care navigator to arrange a  surgical consultation and biopsy.      The findings and recommendations were discussed with the patient at  the time of the exam.      BI-RADS CATEGORY:      BI-RADS Category:  4 Suspicious.  Recommendation:  Surgical Consultation and Biopsy.  Recommended Date:  Immediate.  Laterality:  Bilateral.      ASSESSMENT / PLAN   Diagnoses and all orders for this visit:  Calcification of right breast      Plan  April 2024:  RIGHT; retroareolar calc    1.  The process of the stereotactic biopsy was reviewed with the patient.  She is scheduled for her biopsy today.  2.  She will follow-up with a virtual appointment on 4/24/2024 to review the results.  3.  Discussed that if breast cancer is diagnosed, that she would need to come off of her estrogen therapy.  4.  Reviewed that she has a moderate amount of scar tissue consistent with her previous breast reduction surgery.  The calcifications certainly could be related to the scarring from that surgery.  However, still recommend proceeding with the stereotactic biopsy as planned.      Barbara Campos MD, FACS  [facility]  [address] [place] [address];   [address]  [place]  [address] [phone]

## 2024-04-24 ENCOUNTER — APPOINTMENT (OUTPATIENT)
Dept: SURGERY | Facility: CLINIC | Age: 39
End: 2024-04-24
Payer: COMMERCIAL

## 2024-04-24 LAB
LAB AP ASR DISCLAIMER: NORMAL
LABORATORY COMMENT REPORT: NORMAL
PATH REPORT.ADDENDUM SPEC: NORMAL
PATH REPORT.COMMENTS IMP SPEC: NORMAL
PATH REPORT.FINAL DX SPEC: NORMAL
PATH REPORT.GROSS SPEC: NORMAL
PATH REPORT.RELEVANT HX SPEC: NORMAL
PATH REPORT.TOTAL CANCER: NORMAL

## 2024-04-26 ENCOUNTER — TELEMEDICINE (OUTPATIENT)
Dept: SURGERY | Facility: CLINIC | Age: 39
End: 2024-04-26
Payer: COMMERCIAL

## 2024-04-26 VITALS — BODY MASS INDEX: 37.39 KG/M2 | WEIGHT: 219 LBS | HEIGHT: 64 IN

## 2024-04-26 DIAGNOSIS — D05.11 DUCTAL CARCINOMA IN SITU (DCIS) OF RIGHT BREAST: Primary | ICD-10-CM

## 2024-04-26 DIAGNOSIS — R92.1 CALCIFICATION OF RIGHT BREAST: ICD-10-CM

## 2024-04-26 PROCEDURE — 3008F BODY MASS INDEX DOCD: CPT | Performed by: SURGERY

## 2024-04-26 PROCEDURE — 99442 PR PHYS/QHP TELEPHONE EVALUATION 11-20 MIN: CPT | Performed by: SURGERY

## 2024-04-26 ASSESSMENT — ENCOUNTER SYMPTOMS
ABDOMINAL PAIN: 0
CONFUSION: 0
FEVER: 0
HEMATURIA: 0
HEADACHES: 0
VOMITING: 0
COUGH: 0
FLANK PAIN: 0
POLYPHAGIA: 0
WOUND: 0
DIARRHEA: 0
FREQUENCY: 0
ARTHRALGIAS: 0
EYE REDNESS: 0
NAUSEA: 0
CONSTIPATION: 0
SHORTNESS OF BREATH: 0
SPEECH DIFFICULTY: 0
CHILLS: 0
AGITATION: 0
DYSURIA: 0
WEAKNESS: 0
BRUISES/BLEEDS EASILY: 0
FATIGUE: 0
MYALGIAS: 0
EYE PAIN: 0

## 2024-04-26 NOTE — PATIENT INSTRUCTIONS
1.  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  Please see the education material enclosed.  2.  To better evaluate the extent of the DCIS, and check for involvement of your nipple/areola area, an MRI of your breast will be scheduled.  3.  A stat referral to genetics has been made.  Please consider genetic testing to see if you carry the breast cancer gene.  Results of this testing may influence your choice for type of surgical intervention.  4.  A referral has been made for consultation with plastic surgery to gather information regarding possible reconstructive surgery if a mastectomy surgery is chosen.  5.  Return to Dr. Campos after your MRI and plastic surgery consultation to discuss your results and start planning surgery for the right breast cancer.

## 2024-04-26 NOTE — PROGRESS NOTES
GENERAL SURGERY OFFICE NOTE    Patient: Elizabeth Goss    Age: 38 y.o.   Gender: female    MRN: [id number]    PCP: Abhilash Porter, DO        SUBJECTIVE     Chief Complaint  Follow-up (Patient is following up for a right breast biopsy. Patient states that she has some bruising. Patient states that she did not have any problems with the biopsy.)       HPI  The patient was interviewed via a phone. We spent approximately 19 minutes in the phone communication. The patient gave consent for this type of visit.  I performed this visit using real-time telehealth tools, including a telephone connection between Elizabeth at home and myself / Dr. Campos at the [facility] office.    Elizabeth returns to the office via phone visit for follow-up after undergoing a stereotactic biopsy for subareolar calcifications.  She states that she had some pain during the biopsy, and subsequently developed a moderate size bruise around the areola.  She does not feel that this is infected.  No redness.  No drainage.  She is here to review biopsy results.    Risk factors for breast cancer: 38-year-old white female; menarche at age [age]; first live birth at age 32; no previous breast biopsy; no first-degree relative with breast cancer.  She is premenopausal.  Paternal great aunt had breast cancer.  No family history of ovarian cancer, pancreatic cancer or prostate cancer.  This gives her a 5-year Shahrzad score of 0.7% and a lifetime risk of 12.5% which puts her just above average risk category.    ROS  Review of Systems   Constitutional:  Negative for chills, fatigue and fever.   HENT:  Negative for congestion, ear pain and hearing loss.    Eyes:  Negative for pain and redness.   Respiratory:  Negative for cough and shortness of breath.    Cardiovascular:  Negative for chest pain and leg swelling.   Gastrointestinal:  Negative for abdominal pain, constipation, diarrhea, nausea and vomiting.   Endocrine: Negative for polyphagia.    Genitourinary:  Negative for dysuria, flank pain, frequency and hematuria.   Musculoskeletal:  Negative for arthralgias and myalgias.   Skin:  Negative for rash and wound.   Allergic/Immunologic: Negative for immunocompromised state.   Neurological:  Negative for speech difficulty, weakness and headaches.   Hematological:  Does not bruise/bleed easily.   Psychiatric/Behavioral:  Negative for agitation and confusion.           HISTORY     Past Medical History:   Diagnosis Date    Body mass index (BMI) 36.0-36.9, adult 01/06/2022    Body mass index (BMI) of 36.0 to 36.9 in adult    Encounter for general adult medical examination without abnormal findings 07/21/2022    Preventative health care    Encounter for general adult medical examination without abnormal findings 11/24/2021    Preventative health care    Encounter for immunization 01/12/2021    Encounter for immunization    Lesion of sciatic nerve, left lower limb 08/18/2021    Piriformis syndrome of left side    Lesion of sciatic nerve, left lower limb     Piriformis syndrome of left side    Menstrual migraine, not intractable, without status migrainosus 08/07/2020    Menstrual migraine without status migrainosus, not intractable    Morbid (severe) obesity due to excess calories (Multi) 01/10/2022    Class 2 severe obesity due to excess calories with serious comorbidity and body mass index (BMI) of 36.0 to 36.9 in adult    Other hypersomnia 08/07/2020    Daytime hypersomnolence    Other psychoactive substance use, 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     No Known Allergies     Social History     Tobacco Use   Smoking Status Never   Smokeless Tobacco Never        Social History     Substance and Sexual Activity   Alcohol Use Not Currently    Comment: rarely        HOME MEDICATIONS  Current Outpatient Medications   Medication Instructions    cloNIDine (CATAPRES) 0.1 mg, oral, 2 times daily    fluticasone (Flonase) 50 mcg/actuation nasal spray 1 spray, nasal, Daily    levonorgestreL-ethinyl estrad (Aviane, Alesse, Lessina) 0.1 mg- 20 mcg tablet 1 tablet, oral, Daily    levothyroxine (SYNTHROID, LEVOXYL) 75 mcg, oral, Daily    naltrexone-bupropion (Contrave) 8-90 mg ER tablet 1 tablet, oral, 2 times daily          OBJECTIVE   Last Recorded Vitals.  Height 1.626 m (5' 4\"), weight 99.3 kg (219 lb).     PHYSICAL EXAM  Physical Exam   Previous exam:  General: Well-developed, well-nourished and in no acute distress.  Head: Normocephalic. Atraumatic.  Neck/thyroid: Neck is supple.   Eyes: Pupils equal round and reactive to light. Conjunctiva normal.  ENMT: No masses or deformity of external nose. External ears without masses.  Respiratory/Chest:  Normal respiratory effort.  Breast: Moderate to large sized breasts.  Symmetrical.  No palpable mass of either breast.  No expressible nipple drainage.  Scars consistent with previous breast reduction.  Some denser breast tissue throughout both breast without discernible mass.  Lymphatics: No palpable lymphadenopathy of the cervical, supraclavicular or axillary regions.  Cardiovascular: Regular rate and rhythm.   Abdomen: Soft, nontender, nondistended.   Musculoskeletal: Joints and limbs are grossly normal. Normal gait. Normal range of motion of major joints.  Neuro: Oriented to person, place and time. No obvious neurological deficit. Motor strength grossly normal.  Psych: Normal mood and " affect.    RESULTS   Labs  No results found for this or any previous visit (from the past 24 hour(s)).    Radiology Resutls  MAMMO RIGHT DIAGNOSTIC TOMOSYNTHESIS; BI US BREAST LIMITED RIGHT;  4/11/2024 10:22 am; 4/11/2024 10:54 am      ACCESSION NUMBER(S):  [id number]; [id number]      ORDERING CLINICIAN:  CAYLA MOURA      INDICATION:  Right breast asymmetry.      COMPARISON:  Outside images [id number]      FINDINGS:  2D and tomosynthesis images were reviewed at 1 mm slice thickness.  Magnified views of the right breast were obtained. Right breast  focused ultrasound was performed.      RIGHT BREAST MAMMOGRAMS:      Density:  The breast tissue is heterogeneously dense, which may  obscure small masses.      The retroareolar region has multiple indeterminate calcifications  better visualized on the magnified views that appear to correspond to  areas of ducts.      No suspicious masses or calcifications are identified. The asymmetry  noted previously may have represented overlapping fibroglandular  tissue.      RIGHT BREAST FOCUSED ULTRASOUND:      The background echotexture is heterogeneous.      Imaging along the retroareolar region demonstrates no focal mass or  ductal dilatation.      Right axillary imaging demonstrates no mass or adenopathy.      IMPRESSION:  The retroareolar region of the right breast has indeterminate  calcifications for which surgical consultation and stereotactic  biopsy are recommended.      The patient was referred to our breast care navigator to arrange a  surgical consultation and biopsy.      The findings and recommendations were discussed with the patient at  the time of the exam.      BI-RADS CATEGORY:      BI-RADS Category:  4 Suspicious.  Recommendation:  Surgical Consultation and Biopsy.  Recommended Date:  Immediate.  Laterality:  Bilateral.    Pathology  FINAL DIAGNOSIS   A. Right breast calcifications, stereotactic-guided core biopsy:  -- Ductal carcinoma in situ, cribriform  and papillary patterns, intermediate to high nuclear grade with focal associated microcalcifications, see note.      Note: Immunohistochemical stains for CK5/6 and estrogen receptor are consistent with an atypical proliferation. Multiple deeper levels were reviewed. ER will be reported in an addendum.          peb   Electronically signed by Zacarias Kumar DO on 4/24/2024 at 1123      [facility]   By the signature on this report, the individual or group listed as making the Final Interpretation/Diagnosis certifies that they have reviewed this case.    Comment  [facility]   Case was shown at Breast Case Review Conference via Zoom meeting with concordance    Addendum   Surgical/Block Number:        [id number] A1 and A4  Specimen Site:         Right breast calcifications  Specimen Type:       Core biopsy     Estrogen Receptor (clone SP1):                     Positive                                                                           Percentage with nuclear staining: >95%                                                                               ASSESSMENT / PLAN   Diagnoses and all orders for this visit:  Ductal carcinoma in situ (DCIS) of right breast  -     Referral to Plastic Surgery; Future  -     Referral to Genetics; Future  -     MR breast bilateral w contrast full protocol  Calcification of right breast        Plan  April 2024: RIGHT; retroareolar calc; DCIS; ER+    1.  Reviewed with the patient that the core needle biopsy of the calcifications is consistent with ductal carcinoma in situ.  Her calcifications are less than 1.5 cm behind the nipple/areolar region.  Unclear as to the extent of DCIS and the involvement of the nipple.  Will get a breast MRI to help evaluate the extent of the DCIS and determine if she can have a lumpectomy versus mastectomy surgery.  2.  Her surgical intervention could be more complex that she has already undergone breast reduction surgery previously.  This could make the blood  supply to the nipple/areolar region more tenuous.  3.  With her younger age, and recent diagnosis of DCIS, will refer to genetics for consideration of genetic testing.  Will make this consultation a stat consult as her genetic results could influence her decision regarding surgical intervention.  Reviewed that if she is BRCA positive she may need to consider a bilateral mastectomy.  4.  Will refer to plastic surgery for consultation regarding possible reconstructive surgery if the patient chooses to proceed with either a unilateral or bilateral mastectomy.  5.  Eventually she will need to be referred to medical oncology for endocrine therapy as her DCIS is ER positive.  If she chooses to have a bilateral mastectomy, and no invasive cancer in final pathology, endocrine therapy could be omitted.  6.  She noted that her  is anticipating traveling in June.  Will need to work around her schedule to make sure she has support at home perioperatively.  7.  Will have her return back to the office after her MRI and plastic surgery consultation to discuss results and start scheduling surgical intervention.      Barbara Campos MD, FACS   [facility]  [address] [place] [address];   [facility] [address]; [address]  [place]  [address] [phone]

## 2024-04-29 NOTE — PROGRESS NOTES
HISTORY OF PRESENT ILLNESS:  - Elizabeth Goss is a 38 y.o. female with a recent diagnosis of DCIS.  - she was referred to the Cancer Genetics Clinic at [facility] by her physician, Dr. Barbara Campos MD.   - Elizabeth is interested in genetic testing to clarify her personal risks for cancer, as well as the risks to her family members.   - The appointment today was conducted via video chat due to the current COVID-19 pandemic.     CANCER MEDICAL HISTORY:  Personal History of Cancer?    - She had previously undergone a bilateral breast reduction in 2004. She reports that the pathology of the reduction was benign. She has not had any previous breast issues. She was undergoing her first screening mammogram and was found to have an asymmetry in the retroareolar region of the right breast. Diagnostic mammogram did not show an area of asymmetry, but did show some retroareolar indeterminate calcifications. A stereotactic biopsy of the calcifications has been recommended.   Biopsy showed RIGHT; retroareolar calc; DCIS; ER+   Surgery planned for some time in June     Other ongoing health issues?  -Hypothyroidism; managed by primary care    PREVIOUS GENETIC TESTING?  -None reported     CANCER SCREENING HISTORY:   Breast cancer screening:  Mammograms?   First mammogram was in January 2024, at the time of finding the suspected asymmetry as described above.  Other imaging?   Some imaging around the time of breast reduction (elective); surgery in 2005. No concerns reported.  Clinical breast exams?  Annual with gynecology; no concerns reported.  Breast biopsies?  Pathology was ordered on tissue removed during breast reduction, which was reportedly benign.   Biopsy in April 2024 at the time of finding the current diagnosis   Gynecologic cancer screening:  Hysterectomy?  no  Oophorectomy?  no  Vaginal ultrasound?   Once in 2022 at time of pre-ablation workup; normal  CA-125?   None reported   PAP smear frequency/most  "recent?   Every 3 years; most recent in ; normal  Cervical biopsy in  at the time of pre-ablation workup which was normal   Any abnormal?   Yes, had colposcopy in 2012 due to history of abnormal PAPs  Gastrointestinal cancer screening:  Colonoscopy?   None reported   Endoscopy?  None reported   Other cancer screening:   Dermatology?   Annual skin checks; has had several precancerous moles, but no diagnoses of skin cancer.  Neuroma identified last year   Muscle that grew from scar tissue near tailbone around the site of mole removal    REPRODUCTIVE HISTORY:  # Children:   - 1  # Pregnancies:   - 2   Age first birth:   -32  Breast feeding?:   -A few weeks  Menarche (age):   -12 or 13  Menopause (age):   -Symptoms of menopause began around age 35; abnormal periods began at age 36  -Patient reports that she has low estrogen. She is still getting period, however, it is unclear if this is medically induced   -Has appointment with menopause clinic in a few weeks  OCP:    -About 15 years total    HRT:   -Per note from referring provider, Dr. Barbara Campos, \"she currently is taking estrogen replacement to help with \"emotional/hormonal issues\" \"    SOCIAL HISTORY:  - None reported     FAMILY HISTORY:  A 4-generation pedigree was obtained. The family history was significant for the following:  -Maternal grandmother (, 90s) was diagnosed with breast cancer in her 80s. She  from complications of dementia.  -Father (living, 73) has had many colon polyps; he goes for colonoscopies annually. He had a large portion of his colon removed in  (when he was in his late 50s) due to a large colon polyp, but did not require chemotherapy, radiation therapy, or any other treatment at that time.   -Paternal aunt (living, 60s) had breast cancer in her 40s.   -Paternal grandfather (, 55) was diagnosed with a chronic leukemia as a young adult. He had a heart attack with bypass surgery in his 50s that he did not " "recover well from. Around this time, brain cancer was also diagnosed (shortly before death).   -Paternal grandmother (, 90) had \"the same colon issues\" as the patient's father. She  from complications of old age.   ---Paternal great aunt (sister of paternal grandmother, , 60s)  from breast cancer that was initially diagnosed in her 50s or 60s.   Consanguinity and Ashkenazi Confucianist ancestry were denied. The patient's maternal side is of  descent, and the patient's paternal side is of [place] descent. The remainder of the family history was negative for kidney disease, heart disease, cancer, muscle disease, and blood disorders.    GENETIC COUNSELING/DISCUSSION  Ms. Elizabeth Goss is a 38 y.o. female with a a recent diagnosis of DCIS, and family history of breast cancer and colon polyps.  Based on her age alone, Elizabeth meets NCCN criteria for testing of the BRCA1 and BRCA2 genes. she is interested in testing, which is recommended, and was ordered today via the 34-gene CancerNext panel from [facility]. Our discussion is summarized below.    We reviewed genes and chromosomes, inherited forms of breast and ovarian cancer, and the BRCA1 and BRCA2 genes causing HBOC. We discussed that most cancers are not due to an inherited genetic susceptibility. However, in about 5-10% of families, there is an inherited genetic mutation that can make a person more susceptible to developing certain forms of cancer. Within these families, we often see multiple family members with cancer, occurring in multiple generations. In addition, earlier onset and bilateral cancers are suggestive of an inherited form of cancer. Finally, there is a clustering of certain types of cancer in these families, such as breast and ovarian cancer.    We discussed the BRCA1 and BRCA2 genes, which are two genes that have been linked to early-onset breast and/or ovarian cancer. Changes in these genes (sometimes " referred to as mutations) are inherited in a dominant pattern and confer up to an 87% lifetime risk for breast cancer. This is elevated compared to the general population risk of 10-12%. In addition, BRCA1 and BRCA2 mutation carriers have up to a 45% lifetime risk for ovarian cancer, which is elevated over the 2% general population risk. Mutation carriers who have already been diagnosed with cancer have an increased risk to develop a second, contralateral breast cancer. BRCA2 gene mutation carriers have an increased risk for male breast cancer, prostate cancer, melanoma and pancreatic cancer.    We discussed that there are multiple genes associated with increased breast cancer risk. Some genes, like the BRCA1 and BRCA2 genes, are considered highly penetrant breast cancer genes, meaning a mutation in the gene confers a high risk of breast cancer. Additionally, there are other intermediate (moderate risk) breast cancer genes. For some of the moderate risk genes, there is often limited information regarding the degree to which a mutation in the gene affects risk of different types of cancers. Additionally, for some of these moderate risk genes, the appropriate management for individuals who have a mutation in one of these genes is not always clear. Our knowledge about the cancer risks associated with mutations in these moderate risk genes is always growing, and we will likely be able to provide more comprehensive information in the future.     Most cancer predisposition genetic changes, such as those in BRCA1 and BRCA2, are inherited in an autosomal dominant fashion. This means that if an individual has a change in either of these genes, their siblings and children have a 50% chance of also having that gene change and a 50% chance of not having the gene change.     We reviewed the three results we can get back:  1. Positive- Identified a change in a cancer gene that confers an increased cancer risk. We will discuss  potential changes in management for her and her family based on the specific gene mutation found.  2. Negative- Clears her for a majority of predisposition cancer syndromes that we are aware of, but cannot clear her for any/all cancer predisposition risks. she would continue to be screened based on her personal and family history.  3. Variant of Uncertain Significance (VUS) - We discussed should an uncertain result come back that this would be treated like a negative result (i.e. no management recommendation will be made no familial variant testing) as the implications of this finding are currently unknown.    Lastly, we discussed the Genetic Information Non-discrimination Act (VITOR) of 2008. We discussed that per this federal law, employers (at companies with 15 employees or greater) and health insurance companies (barring  and other  insurances) are forbidden to ask for and use genetic information against another person. As such, health insurance companies cannot ask for genetic information and use findings affect coverage or rates. However, luxury insurances such as life insurance, long term care insurance, and/or private disability insurance companies are not forbidden against using genetic information when an individual takes out a new/additional policy in one of those areas. As such, for unaffected individuals it could be beneficial to explore/take out policies in luxury insurance areas PRIOR to undergoing genetic testing.    Elizabeth was counseled about hereditary cancer susceptibility including cancer risks, options for increased screening and/or risk reduction, genetic testing, and the implications for other family members. We discussed performing genetic testing in the context of a multi-gene panel test that looks at the BRCA1 and BRCA2, as well as moderate penetrant genes. she is interested in this approach, which is recommended and was ordered today via the 34-gene CancerNext panel from  [facility]:    Genes tested: APC, BERTIN, BARD1, BMPR1A, BRCA1, BRCA2, BRIP1, CDH1, CDK4, CDKN2A, CHEK2, DICER1, MLH1, MSH2, MSH6, MUTYH, NF1, NTHL1, PALB2, PMS2, PTEN, RAD51C, RAD51D, RECQL, SMARCA4, STK11 and TP53 (sequencing and deletion/duplication); AXIN2, HOXB13, MSH3, POLD1 and POLE (sequencing only); EPCAM and GREM1 deletion/duplication only).    I will request STAT processing due to pending surgery. Results should be available within about 2 weeks of sample collection, and Elizabeth will return to the Cancer Genetics Clinic to discuss her testing results. At that time, we will make recommendations for both Elizabeth and her family members in terms of cancer screening and/or cancer risk reduction options.       PLAN:  Elizabeth Goss elected to undergo genetic testing via a panel test that analyzes 34 genes associated with breast, ovarian and other cancer risks. Elizabeth elected to undergo genetic testing via the panel test described above. Oral consent for testing was obtained. An [employer] DNA/RNA blood test kit will be sent to the patient's home. She will then bring the test kit to a  outpatient blood draw lab to have their blood drawn for testing (using the test tubes provided in the kit). The sample will then be sent to [facility] for analysis. Results are typically available in 1-2 weeks when STAT processing is requested.     2. I will call the patient when results are available for review.  3. We remain available to Elizabeth and her family members at [phone] if any questions arise regarding information discussed at today's visit.    Time spent telecounselin min    Sincerely,   Jennifer Larios MS, [facility] [place]  Licensed Genetic Counselor     Reviewed by:  Dr. Toyin Rowe MD  Clinical

## 2024-05-01 ENCOUNTER — TELEMEDICINE CLINICAL SUPPORT (OUTPATIENT)
Dept: GENETICS | Facility: CLINIC | Age: 39
End: 2024-05-01
Payer: COMMERCIAL

## 2024-05-01 DIAGNOSIS — Z71.83 ENCOUNTER FOR NONPROCREATIVE GENETIC COUNSELING: ICD-10-CM

## 2024-05-01 DIAGNOSIS — Z80.3 FAMILY HISTORY OF MALIGNANT NEOPLASM OF BREAST: ICD-10-CM

## 2024-05-01 DIAGNOSIS — D05.11 DUCTAL CARCINOMA IN SITU (DCIS) OF RIGHT BREAST: ICD-10-CM

## 2024-05-01 PROCEDURE — 96040 PR MEDICAL GENETICS COUNSELING EACH 30 MINUTES: CPT

## 2024-05-16 ENCOUNTER — APPOINTMENT (OUTPATIENT)
Dept: GENETICS | Facility: CLINIC | Age: 39
End: 2024-05-16
Payer: COMMERCIAL

## 2024-05-17 ENCOUNTER — OFFICE VISIT (OUTPATIENT)
Dept: OBSTETRICS AND GYNECOLOGY | Facility: CLINIC | Age: 39
End: 2024-05-17
Payer: COMMERCIAL

## 2024-05-17 VITALS
DIASTOLIC BLOOD PRESSURE: 70 MMHG | SYSTOLIC BLOOD PRESSURE: 120 MMHG | WEIGHT: 224.2 LBS | HEIGHT: 64 IN | BODY MASS INDEX: 38.28 KG/M2

## 2024-05-17 DIAGNOSIS — N95.1 HOT FLASHES DUE TO MENOPAUSE: Primary | ICD-10-CM

## 2024-05-17 DIAGNOSIS — Z30.09 GENERAL COUNSELING AND ADVICE FOR CONTRACEPTIVE MANAGEMENT: ICD-10-CM

## 2024-05-17 PROCEDURE — 99214 OFFICE O/P EST MOD 30 MIN: CPT | Performed by: NURSE PRACTITIONER

## 2024-05-17 PROCEDURE — 1036F TOBACCO NON-USER: CPT | Performed by: NURSE PRACTITIONER

## 2024-05-17 PROCEDURE — 3008F BODY MASS INDEX DOCD: CPT | Performed by: NURSE PRACTITIONER

## 2024-05-17 PROCEDURE — 3078F DIAST BP <80 MM HG: CPT | Performed by: NURSE PRACTITIONER

## 2024-05-17 PROCEDURE — 3074F SYST BP LT 130 MM HG: CPT | Performed by: NURSE PRACTITIONER

## 2024-05-17 RX ORDER — DROSPIRENONE 4 MG/1
4 TABLET, FILM COATED ORAL DAILY
Qty: 30 TABLET | Refills: 11 | Status: SHIPPED | OUTPATIENT
Start: 2024-05-17

## 2024-05-17 RX ORDER — PAROXETINE 10 MG/1
10 TABLET, FILM COATED ORAL NIGHTLY
Qty: 30 TABLET | Refills: 11 | Status: SHIPPED | OUTPATIENT
Start: 2024-05-17 | End: 2025-05-17

## 2024-05-17 ASSESSMENT — ENCOUNTER SYMPTOMS
MUSCULOSKELETAL NEGATIVE: 0
ENDOCRINE NEGATIVE: 0
RESPIRATORY NEGATIVE: 0
GASTROINTESTINAL NEGATIVE: 0
CONSTITUTIONAL NEGATIVE: 0
ALLERGIC/IMMUNOLOGIC NEGATIVE: 0
NEUROLOGICAL NEGATIVE: 0
HEMATOLOGIC/LYMPHATIC NEGATIVE: 0
EYES NEGATIVE: 0
PSYCHIATRIC NEGATIVE: 0
CARDIOVASCULAR NEGATIVE: 0

## 2024-05-17 ASSESSMENT — PAIN SCALES - GENERAL: PAINLEVEL: 0-NO PAIN

## 2024-05-17 NOTE — PROGRESS NOTES
Subjective   Patient ID: Elizabeth Goss is a 38 y.o. female who presents for Menopause.  HPI  Concerns:   2021  had a vasectomy; she stopped her POP  Soon after she developed mood swings, VMS, heart palpitations  Developed really heavy menses  2/2022: started on progesterone until fall of 2022; menses stopped  Weight gain, previously was on weight loss medications: Trulicity until 2023  Started OCP end of 2023  Continued to have VMS and heart palpitations  H/o hypothyroidism    Diagnosed with breast cancer 3 weeks ago  Stage 0, ductal   Going through genetic testing and other testing to determine a plan of care    Menopausal age: perimenopausal     contraception:  OCP, vasectomy  Previously had Mirena IUD and she did not like it  use of OTC remedies: none  bioidenticals: none    Vaginal bleeding:  monthly with 5-6 days of moderate VB  VMS: yes  Sleep difficulties: yes  Mood changes: yes  Joint pain: yes  Brain fog/difficulty concentrating: yes    GSM: yes-dryness  are you sexually active: yes  dyspareunia: none  decrease in desire: yes  difficulty reaching orgasm:  yes  Urinary Incontinence: yes, occasional stress       Fractures: none  H/O abnormal pap: yes, 2009 colposcopy       Review of Systems    Objective   Physical Exam    Assessment/Plan   Diagnoses and all orders for this visit:  Hot flashes due to menopause  -     PARoxetine (PaxiL) 10 mg tablet; Take 1 tablet (10 mg) by mouth once daily at bedtime. Take one tablet by mouth at the bedtime  General counseling and advice for contraceptive management  -     drospirenone, contraceptive, (Slynd) 4 mg (28) tablet; Take 1 tablet by mouth once daily.       Non-hormonal prescription options for menopausal hot flashes include: Brisdelle, Veozah, SSRI, SNRI, gabapentin, or oxybutynin.  The 2 FDA approved options are Brisdelle and Veozah. The other options I mentioned have been studied and are safe and effective in menopausal women, but not officially FDA  "approved, which means they are used \"off label.\" Brisdelle is 7.5mg of paroxetine, however, the cost is usually much lower if instead of Brisdelle I prescribe 10mg paroxetine.  More common side effects may include:  Gabapentin, SSRI, and SNRI includes weight gain; however, the 10mg of paroxetine is less likely to cause weight gain  SSRI and SNRI may include a loss of libido and difficulty reaching orgasm  Gabapentin may include dizziness and drowsiness  Oxybutynin may include dry mouth    In studies, Veozah has not been shown to cause liver damage, but the FDA has recommended baseline testing and at 3, 6,and 9 months of liver function tests.     Decision for 10mg po paroxetine  Pt to finish OCP this month and then transition to Slynd which she was on previously; sample given today  Pt to keep me updated on the plan of care for her breast cancer  Will discuss efficacy of paroxetine in 4-6 weeks    Ward Pisano, KATY-CNP 05/17/24 10:29 AM   "

## 2024-05-21 ENCOUNTER — HOSPITAL ENCOUNTER (OUTPATIENT)
Dept: RADIOLOGY | Facility: HOSPITAL | Age: 39
Discharge: HOME | End: 2024-05-21
Payer: COMMERCIAL

## 2024-05-21 PROCEDURE — 77049 MRI BREAST C-+ W/CAD BI: CPT | Performed by: STUDENT IN AN ORGANIZED HEALTH CARE EDUCATION/TRAINING PROGRAM

## 2024-05-21 PROCEDURE — A9575 INJ GADOTERATE MEGLUMI 0.1ML: HCPCS | Performed by: SURGERY

## 2024-05-21 PROCEDURE — 77049 MRI BREAST C-+ W/CAD BI: CPT

## 2024-05-21 PROCEDURE — 2550000001 HC RX 255 CONTRASTS: Performed by: SURGERY

## 2024-05-21 RX ORDER — GADOTERATE MEGLUMINE 376.9 MG/ML
0.2 INJECTION INTRAVENOUS
Status: COMPLETED | OUTPATIENT
Start: 2024-05-21 | End: 2024-05-21

## 2024-05-21 RX ADMIN — GADOTERATE MEGLUMINE 19.5 ML: 376.9 INJECTION INTRAVENOUS at 07:55

## 2024-05-22 NOTE — RESULT ENCOUNTER NOTE
Reviewed results with patient.  Area of concern measures approximately 6 cm and does involve the base of the nipple.  She is considering a right mastectomy with sentinel lymph node biopsy and stage I reconstructive surgery for 12/25/2024 as she is going on vacation in mid July.  Genetic testing still pending.  She will keep her follow-up appointment with me next week

## 2024-05-23 ENCOUNTER — TELEPHONE (OUTPATIENT)
Dept: SURGERY | Facility: HOSPITAL | Age: 39
End: 2024-05-23
Payer: COMMERCIAL

## 2024-05-23 NOTE — TELEPHONE ENCOUNTER
----- Message from Bc Lamb MA sent at 5/22/2024  2:35 PM EDT -----  Regarding: FW: Plastic surgery appointment  Manisha will block 7/25/24 for Dr. Travis.  ----- Message -----  From: Bc Lamb MA  Sent: 5/22/2024   2:23 PM EDT  To: Bc Lamb MA  Subject: FW: Plastic surgery appointment                  I emailed Manisha as Dr. Travis's office phones were rules.  ----- Message -----  From: Barbara Campos MD  Sent: 5/22/2024  10:37 AM EDT  To: Bc Lamb MA  Subject: FW: Plastic surgery appointment                  I talked with Elizabeth regarding her MRI results.  The area of concern is larger on MRI (6 cm) than on mammogram.  The area of concern also seems to involve the base of the nipple.  She is considering having a skin sparing right mastectomy with sentinel lymph node biopsy and stage I reconstructive surgery.    1.  Contact Dr. Travis's office to see if he would be available on 7/25/2024 to do her surgery.  Let them know that we are still waiting on the genetic testing results before deciding on unilateral versus bilateral.  2.  She has an appointment with me on 5/29/2024 that she will keep to discuss surgical details.    ----- Message -----  From: Barbara Campos MD  Sent: 5/22/2024   8:00 AM EDT  To: Barbara Campos MD  Subject: RE: Plastic surgery appointment                  I talked with Elizabeth regarding her plastic surgery appointment.  She states that they discussed several different options.  Her breast MRI is scheduled for next Tuesday, 5/21/2024.  She did send off her genetic testing, and hopefully these results will come back in the next 1 to 2 weeks.  Depending on both of these results would determine which surgical intervention she would choose.  She does have a follow-up with me on 5/29/2024 and plastic surgery 6/6/2024 to finalize surgical scheduling.    ----- Message -----  From: Barbara Campos MD  Sent: 5/16/2024   8:00 AM EDT  To: Barbara Campos,  MD  Subject: FW: Plastic surgery appointment                  Follow up with patient after her plastic surgery consult.    ----- Message -----  From: Bc Lamb MA  Sent: 5/3/2024  12:43 PM EDT  To: Barbara Campos MD  Subject: FW: Plastic surgery appointment                  Patient is scheduled with Dr. Travis on 5/15/24 at 10:15.  ----- Message -----  From: Chan Castellanos  Sent: 4/29/2024  11:21 AM EDT  To: Bc Lamb MA  Subject: FW: Plastic surgery appointment                    ----- Message -----  From: Barbara Campos MD  Sent: 4/29/2024  11:14 AM EDT  To: Chan Castellanos  Subject: Plastic surgery appointment                      Please, let me know when her plastic surgery consult is scheduled so I can follow up with her afterwards.

## 2024-05-29 ENCOUNTER — OFFICE VISIT (OUTPATIENT)
Dept: SURGERY | Facility: CLINIC | Age: 39
End: 2024-05-29
Payer: COMMERCIAL

## 2024-05-29 VITALS
DIASTOLIC BLOOD PRESSURE: 88 MMHG | SYSTOLIC BLOOD PRESSURE: 129 MMHG | HEART RATE: 94 BPM | BODY MASS INDEX: 37.73 KG/M2 | HEIGHT: 64 IN | WEIGHT: 221 LBS | OXYGEN SATURATION: 96 %

## 2024-05-29 DIAGNOSIS — D05.11 DUCTAL CARCINOMA IN SITU (DCIS) OF RIGHT BREAST: Primary | ICD-10-CM

## 2024-05-29 DIAGNOSIS — R92.1 CALCIFICATION OF RIGHT BREAST: ICD-10-CM

## 2024-05-29 PROCEDURE — 3008F BODY MASS INDEX DOCD: CPT | Performed by: SURGERY

## 2024-05-29 PROCEDURE — 3079F DIAST BP 80-89 MM HG: CPT | Performed by: SURGERY

## 2024-05-29 PROCEDURE — 1036F TOBACCO NON-USER: CPT | Performed by: SURGERY

## 2024-05-29 PROCEDURE — 3074F SYST BP LT 130 MM HG: CPT | Performed by: SURGERY

## 2024-05-29 PROCEDURE — 99215 OFFICE O/P EST HI 40 MIN: CPT | Performed by: SURGERY

## 2024-05-29 ASSESSMENT — ENCOUNTER SYMPTOMS
CHILLS: 0
FEVER: 0
ARTHRALGIAS: 0
DYSURIA: 0
AGITATION: 0
NAUSEA: 0
HEADACHES: 0
ABDOMINAL PAIN: 0
HEMATURIA: 0
FREQUENCY: 0
CONSTIPATION: 0
POLYPHAGIA: 0
SPEECH DIFFICULTY: 0
CONFUSION: 0
SHORTNESS OF BREATH: 0
COUGH: 0
FLANK PAIN: 0
WEAKNESS: 0
DIARRHEA: 0
VOMITING: 0
WOUND: 0
FATIGUE: 0
MYALGIAS: 0
EYE PAIN: 0
EYE REDNESS: 0
BRUISES/BLEEDS EASILY: 0

## 2024-05-29 NOTE — PROGRESS NOTES
GENERAL SURGERY OFFICE NOTE    Patient: Elizabeth Goss    Age: 38 y.o.   Gender: female    MRN: [id number]    PCP: Abhilash Porter, DO        SUBJECTIVE     Chief Complaint  Follow-up (Patient is here for a breast follow up. Patient states she is not having any new or worsening symptoms. Patient would like to discuss her next steps in treatment .)       CHRISTY Johnson returns to the office for follow-up of her right breast cancer.  Since her last office appointment, she did have consultation with plastic surgery and her breast MRI. The area of concern is larger on MRI (6 cm) than on mammogram.  The area of concern also seems to involve the base of the nipple.  She is considering having a skin sparing right mastectomy with sentinel lymph node biopsy and stage I reconstructive surgery.  She is still awaiting her genetic results.    Original risk factors for breast cancer: 38-year-old white female; menarche at age [age]; first live birth at age 32; no previous breast biopsy; no first-degree relative with breast cancer.  She is premenopausal.  Paternal great aunt had breast cancer.  No family history of ovarian cancer, pancreatic cancer or prostate cancer.  This gives her a 5-year Shahrzad score of 0.7% and a lifetime risk of 12.5% which puts her just above average risk category.    ROS  Review of Systems   Constitutional:  Negative for chills, fatigue and fever.   HENT:  Negative for congestion, ear pain and hearing loss.    Eyes:  Negative for pain and redness.   Respiratory:  Negative for cough and shortness of breath.    Cardiovascular:  Negative for chest pain and leg swelling.   Gastrointestinal:  Negative for abdominal pain, constipation, diarrhea, nausea and vomiting.   Endocrine: Negative for polyphagia.   Genitourinary:  Negative for dysuria, flank pain, frequency and hematuria.   Musculoskeletal:  Negative for arthralgias and myalgias.   Skin:  Negative for rash and wound.   Allergic/Immunologic:  Negative for immunocompromised state.   Neurological:  Negative for speech difficulty, weakness and headaches.   Hematological:  Does not bruise/bleed easily.   Psychiatric/Behavioral:  Negative for agitation and confusion.           HISTORY     Past Medical History:   Diagnosis Date    Body mass index (BMI) 36.0-36.9, adult 01/06/2022    Body mass index (BMI) of 36.0 to 36.9 in adult    Encounter for general adult medical examination without abnormal findings 07/21/2022    Preventative health care    Encounter for general adult medical examination without abnormal findings 11/24/2021    Preventative health care    Encounter for immunization 01/12/2021    Encounter for immunization    Lesion of sciatic nerve, left lower limb 08/18/2021    Piriformis syndrome of left side    Lesion of sciatic nerve, left lower limb     Piriformis syndrome of left side    Menstrual migraine, not intractable, without status migrainosus 08/07/2020    Menstrual migraine without status migrainosus, not intractable    Morbid (severe) obesity due to excess calories (Multi) 01/10/2022    Class 2 severe obesity due to excess calories with serious comorbidity and body mass index (BMI) of 36.0 to 36.9 in adult    Other hypersomnia 08/07/2020    Daytime hypersomnolence    Other psychoactive substance use, 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 (Multi) 08/07/2020    Drug-induced insomnia    Personal history of other diseases of the circulatory system 07/21/2022    History of essential hypertension    Personal history of other endocrine, nutritional and metabolic disease 08/07/2020    History of hypothyroidism    PONV (postoperative nausea and vomiting) 2005        Past Surgical History:   Procedure Laterality Date    OTHER SURGICAL HISTORY  10/23/2019    Breast reduction    OTHER SURGICAL HISTORY  08/07/2020    Tonsillectomy        No family history on file.     No Known Allergies     Social History     Tobacco Use  "  Smoking Status Never   Smokeless Tobacco Never        Social History     Substance and Sexual Activity   Alcohol Use Not Currently    Comment: rarely        HOME MEDICATIONS  Current Outpatient Medications   Medication Instructions    cloNIDine (CATAPRES) 0.1 mg, oral, 2 times daily    drospirenone, contraceptive, (Slynd) 4 mg (28) tablet 1 tablet, oral, Daily    fluticasone (Flonase) 50 mcg/actuation nasal spray 1 spray, nasal, Daily    levothyroxine (SYNTHROID, LEVOXYL) 75 mcg, oral, Daily    PARoxetine (PAXIL) 10 mg, oral, Nightly, Take one tablet by mouth at the bedtime          OBJECTIVE   Last Recorded Vitals.  Blood pressure 129/88, pulse 94, height 1.626 m (5' 4\"), weight 100 kg (221 lb), last menstrual period 05/03/2024, SpO2 96%.     PHYSICAL EXAM  Physical Exam   Previous exam:  General: Well-developed, well-nourished and in no acute distress.  Head: Normocephalic. Atraumatic.  Neck/thyroid: Neck is supple.   Eyes: Pupils equal round and reactive to light. Conjunctiva normal.  ENMT: No masses or deformity of external nose. External ears without masses.  Respiratory/Chest:  Normal respiratory effort.  Breast: Moderate to large sized breasts.  Symmetrical.  No palpable mass of either breast.  No expressible nipple drainage.  Scars consistent with previous breast reduction.  Some denser breast tissue throughout both breast without discernible mass.  Lymphatics: No palpable lymphadenopathy of the cervical, supraclavicular or axillary regions.  Cardiovascular: Regular rate and rhythm.   Abdomen: Soft, nontender, nondistended.   Musculoskeletal: Joints and limbs are grossly normal. Normal gait. Normal range of motion of major joints.  Neuro: Oriented to person, place and time. No obvious neurological deficit. Motor strength grossly normal.  Psych: Normal mood and affect.    RESULTS   Labs  No results found for this or any previous visit (from the past 24 hour(s)).    Radiology Resutls  MR BREAST BILATERAL " WITH CONTRAST FULL PROTOCOL;  5/21/2024 8:12 am      ACCESSION NUMBER(S):  [id number]      ORDERING CLINICIAN:  KEVEN MITCHELL      INDICATION:  Recent diagnosis of right breast cancer, to evaluate extent of  disease. The status post stereotactic guided biopsy of calcifications  in retroareolar region of the right breast on 04/17/2024 with  pathology showing intermediate to high-grade DCIS. History of  bilateral breast reduction.      COMPARISON:  No prior breast MRI is available. Stereotactic 04/17/2024, diagnostic  mammogram and ultrasound 04/11/2024, screening mammogram 03/19/2024.      TECHNIQUE:  Using a dedicated breast coil, STIR axial and T1-weighted fat  saturation axial images of the breasts were obtained, the latter both  before and after intravenous administration of Gadolinium DTPA. On an  independent workstation, 3-D images were formulated using UXFLIP  including time enhancement curves, subtraction images and MIP images.      Intravenous contrast: 19.5 mL of Dotarem      FINDINGS:  There is symmetric minimal bilateral background enhancement.      Density:  Scattered fibroglandular tissue.      RIGHT BREAST:  A biopsy marker is seen in the central inferior aspect  of the retroareolar region corresponding to biopsy-proven ductal  carcinoma in-situ. Segmental clumped non mass enhancement is seen  associated with a biopsy clip in central and inferior medial breast  extending from the middle depth to the base of the base of the nipple  spanning approximately 6.0 x 3.3 x 2.8 cm (TR x AP x CC). The extent  of the abnormality is best appreciated on the MIP reconstructed  images (series 510, image 14). The extent of enhancement particularly  in the inferior medial breast is greater than the extent of  calcifications on mammogram. Subtle mammographic focal asymmetry is  questioned on mammogram at this site. The abnormal enhancement  extends to the base of the nipple without suspicious nipple  enhancement.  There is no skin or chest wall involvement.      No axillary or internal mammary lymphadenopathy is appreciated.      LEFT BREAST: Benign postsurgical changes of reduction. No suspicious  mass or nonmass enhancement is identified.      No axillary or internal mammary lymphadenopathy is appreciated.      NON-BREAST FINDINGS:  None.      IMPRESSION:  1. Biopsy-proven malignancy in subareolar right breast with  contiguous non-mass enhancement extending in inferior medial breast  spanning up to 6 cm and extending to the base of the nipple. The  extent of abnormality is greater on MRI compared to calcifications on  mammogram. If breast conservation is pursued, MRI-guided biopsy of  the posterior extent of enhancement is recommended.      2. No evidence of lymphadenopathy.      3. No MRI evidence of malignancy in the left breast.      BI-RADS CATEGORY:  BI-RADS CATEGORY:  6 Known Biopsy-Proven Malignancy.  Recommendation:  Immediate Follow-up.  Recommended Date:  Immediate.  Laterality:  Right.    Pathology  FINAL DIAGNOSIS   A. Right breast calcifications, stereotactic-guided core biopsy:  -- Ductal carcinoma in situ, cribriform and papillary patterns, intermediate to high nuclear grade with focal associated microcalcifications, see note.      Note: Immunohistochemical stains for CK5/6 and estrogen receptor are consistent with an atypical proliferation. Multiple deeper levels were reviewed. ER will be reported in an addendum.          peb   Electronically signed by Zacarias Kumar DO on 4/24/2024 at 1123      [facility]   By the signature on this report, the individual or group listed as making the Final Interpretation/Diagnosis certifies that they have reviewed this case.    Comment  [facility]   Case was shown at Breast Case Review Conference via Zoom meeting with concordance    Addendum   Surgical/Block Number:        [id number] A1 and A4  Specimen Site:         Right breast calcifications  Specimen Type:       Core biopsy      Estrogen Receptor (clone SP1):                     Positive                                                                           Percentage with nuclear staining: >95%                                                                               ASSESSMENT / PLAN   Diagnoses and all orders for this visit:  Ductal carcinoma in situ (DCIS) of right breast  -     Case Request Operating Room: Bilateral mastectomy with right sentinel lymph node biopsy  Calcification of right breast          Plan  April 2024: RIGHT; retroareolar calc; DCIS; ER+; MRI suggests a 6cm area of involvement including the base of the nipple    1.  Reviewed with the patient that the core needle biopsy of the calcifications is consistent with ductal carcinoma in situ.  Her calcifications are less than 1.5 cm behind the nipple/areolar region.  Unclear as to the extent of DCIS and the involvement of the nipple.  MRI was performed to help determine the extent of disease and possible nipple involvement.  MRI suggested an area of 6 cm with involvement of the base of the nipple.  2.  Her surgical intervention could be more complex that she has already undergone breast reduction surgery previously.  This could make the blood supply to the nipple/areolar region more tenuous.  3.  With her younger age, and recent diagnosis of DCIS, she was referred to genetics for consideration of genetic testing.  Her testing was sent off approximately 4 weeks ago.  Results are still pending.  4.  She has been seen by plastic surgery who is anticipating staged reconstructive surgery postmastectomy.  5.  Eventually she will need to be referred to medical oncology for endocrine therapy as her DCIS is ER positive.  If she chooses to have a bilateral mastectomy, and no invasive cancer in final pathology, endocrine therapy could be omitted.  Reviewed that pending final results of her surgery (possibility of underlying invasive cancer and/or positive lymph nodes) would  determine need for chemotherapy and radiation.  6.  She noted that her  is anticipating traveling in June/July.  Therefore, surgery date has been chosen for 7/25/2024.  7.  At this point, she is ready to schedule for a bilateral mastectomy with right sentinel lymph node biopsy and stage I reconstructive surgery.  Surgery scheduled for 7/25/2024.  Genetics results  still pending.      Barbara Campos MD, FACS  [facility] General Surgery  [address] [place] [address];   [address]  [place] [id number] [phone]

## 2024-05-29 NOTE — PATIENT INSTRUCTIONS
1.  Surgery for your breast cancer is scheduled on Thursday, 7/25/2024.  Please, read the patient preoperative instruction sheet BEFORE your surgery.  2.  You will need to do your showers with your Hibiclens soap prior to seeing Dr. Travis on the day before your surgery.  3.  See Dr. Travis (plastic surgeon) the day before your surgery to get markings.  4.  Your genetic testing is still pending.  Follow-up with genetics after results available.

## 2024-05-30 ENCOUNTER — TELEPHONE (OUTPATIENT)
Dept: GENETICS | Facility: CLINIC | Age: 39
End: 2024-05-30
Payer: COMMERCIAL

## 2024-05-30 LAB — SCAN RESULT: NORMAL

## 2024-05-30 NOTE — TELEPHONE ENCOUNTER
Ms. Elizabeth Goss is a 38 y.o. female with a personal history of breast cancer. Elizabeth was initially seen in the [facility] on 05/01/2024 for counseling and coordination of genetic testing. Based on her personal history of cancer, the 34-gene CancerNext panel from [employer] was ordered. I called her to review the results of this testing, and our discussion is summarized below.    Elizabeth Goss's results were NEGATIVE, meaning that no disease causing mutations were identified. A genetic cause for her personal history of cancer has not been identified.    Genes tested:  APC, BERTIN, BARD1, BMPR1A, BRCA1, BRCA2, BRIP1, CDH1, CDK4, CDKN2A, CHEK2, DICER1, MLH1, MSH2, MSH6, MUTYH, NF1, NTHL1, PALB2, PMS2, PTEN, RAD51C, RAD51D, RECQL, SMARCA4, STK11 and TP53 (sequencing and deletion/duplication); AXIN2, HOXB13, MSH3, POLD1 and POLE (sequencing only); EPCAM and GREM1 deletion/duplication only).    Please see linked media for a copy of the full report, including all genes screened for.    Negative genetic testing results can be explained by several possibilities:  - It is estimated that a small percentage of gene mutations are not identified by current testing technology. This negative test result makes a hereditary breast cancer syndrome less likely, but it cannot rule one out completely.  - There could be other genes that may increase the risk for cancer for which there is no testing available at this time.   - The cancer in the family is not due to an inherited risk factor, and instead is sporadic or due to chance.    One reason Elizabeth underwent genetic testing was to better understand her risk to develop a second breast cancer to help determine if further breast screening or surgery is indicated. Because her results were negative, Elizabeth does not have an identifiable genetic risk factor that places her at an increased risk to develop a second breast cancer. she should continue to follow  the recommendations of her physicians regarding ongoing treatment and follow-up/surveillance.    Elizabeth was not found to have a pathogenic (disease-causing) genetic alteration in any of the tested genes that she could have passed down to her children. While it is reassuring that no mutations were detected in the tested genes, Elizabeth's female family members remain at an increased risk to develop breast cancer based on the family history. They should speak to their healthcare providers about their breast cancer screening protocols, as they may be a candidate for annual breast MRIs, in addition to an annual mammogram and clinical breast exam. Breast cancer screening should also begin 10 years younger than the youngest diagnosis in the family, or by age 40, whichever comes first.     For women with a lifetime risk of breast cancer estimated to be >20% by a risk estimation tool like the Tyrer-Cuzick risk calculator, the National Comprehensive Cancer Network (NCCN) guidelines include:   Breast awareness  Clinical breast examination performed by a physician every 6-12 months and annual mammogram to begin 10 years prior to the youngest breast cancer diagnosis in the family, but not less than age 30.   The NCCN also suggests screening with breast MRI as an adjunct to mammogram in the high risk setting, with breast MRI screening beginning 10 years prior to the youngest breast cancer diagnosis in the family, but not less than age 25.  Recommendations and options for management should be discussed with managing physicians.     It was previously discussed that women with BRCA1 and BRCA2 mutations have an increased risk for ovarian cancer. With negative test results and no family history of ovarian cancer, she is not considered at increased risk for this cancer type from a genetic standpoint. Prophylactic surgery is not indicated from a genetic standpoint.    she should also follow with her primary care providers for all  other age-related cancer screenings, such as pap smears, colonoscopies, etc. We reviewed that based on the number and pathology of the patient's father's colon polyps, he may be eligible for his own genetic testing to determine if Ms. Goss should consider increased colonoscopy frequency.     Regarding her children, no genetic testing is recommended for them at this time, since her test results did not show anything they need to be tested for.    Plan:  - I will upload a more clear copy of the genetic test report to [facility] for Ms. Goss to be able to view  - Our understanding of genetic contribution to cancer diagnoses is always evolving, so there may be additional testing recommended in the future. she can contact us in 3-5 years to determine if there have been any changes since our discussion today.    Time spent telecounselin min    Sincerely,   Jennifer Larios MS, [facility]  Licensed Genetic Counselor

## 2024-05-31 ENCOUNTER — DOCUMENTATION (OUTPATIENT)
Dept: SURGERY | Facility: HOSPITAL | Age: 39
End: 2024-05-31
Payer: COMMERCIAL

## 2024-05-31 DIAGNOSIS — D05.11 DUCTAL CARCINOMA IN SITU (DCIS) OF RIGHT BREAST: Primary | ICD-10-CM

## 2024-05-31 DIAGNOSIS — Z13.71 BRCA NEGATIVE: ICD-10-CM

## 2024-05-31 NOTE — PROGRESS NOTES
Had a brief telephone conversation with Elizabeth.  Her BRCA testing is negative.  Reviewed these results with the patient and the impact on her daughter regarding high risk screening when her daughter reaches 25 years of age.  Also reviewed Elizabeth's upcoming surgical intervention.  She has elected to proceed with bilateral mastectomy despite the negative BRCA testing.

## 2024-06-17 DIAGNOSIS — Z30.09 GENERAL COUNSELING AND ADVICE FOR CONTRACEPTIVE MANAGEMENT: ICD-10-CM

## 2024-06-17 RX ORDER — DROSPIRENONE 4 MG/1
4 TABLET, FILM COATED ORAL DAILY
Qty: 90 TABLET | Refills: 3 | Status: SHIPPED | OUTPATIENT
Start: 2024-06-17

## 2024-06-19 ENCOUNTER — TELEPHONE (OUTPATIENT)
Dept: RADIOLOGY | Facility: HOSPITAL | Age: 39
End: 2024-06-19
Payer: COMMERCIAL

## 2024-06-19 NOTE — TELEPHONE ENCOUNTER
This RN Navigator contacted patient for preoperative outreach to identify barriers/needs, and offer support. Reintroduced myself to patient and role. Patient verbalized having received education regarding diagnosis and plan of care during provider visits. Patient reports she is doing well at this time with no immediate questions.  Discussed what to expect before, during, and after surgery and reviewed patient's plan to include spouse (Jake) in post-operative planning. 7 yo daughter will be cared for by patient's parents during surgery/postop. Provided therapeutic listening and emotional support, encouraging patient to call with any additional questions, concerns, or support needs. Discussed options for supportive services including but not limited to: financial counselor, dietitian, support groups, and boutiques. Patient is interested in child/family friendly groups and will send info to patient preferred email per her request. Patient verbalized understanding and denied further needs before concluding our call.

## 2024-06-27 ENCOUNTER — TELEPHONE (OUTPATIENT)
Dept: SURGERY | Facility: CLINIC | Age: 39
End: 2024-06-27
Payer: COMMERCIAL

## 2024-06-27 NOTE — TELEPHONE ENCOUNTER
Leonor from Nuclear Medicine called about the bilateral mastectomy procedure scheduled for 07/25/24. Leonor stated that they require an order put in for the sentinel lymph mode biopsy.

## 2024-06-28 DIAGNOSIS — D05.11 DUCTAL CARCINOMA IN SITU (DCIS) OF RIGHT BREAST: Primary | ICD-10-CM

## 2024-07-11 ENCOUNTER — LAB (OUTPATIENT)
Dept: LAB | Facility: LAB | Age: 39
End: 2024-07-11
Payer: COMMERCIAL

## 2024-07-11 DIAGNOSIS — R73.02 IGT (IMPAIRED GLUCOSE TOLERANCE): ICD-10-CM

## 2024-07-11 DIAGNOSIS — E03.9 PRIMARY HYPOTHYROIDISM: ICD-10-CM

## 2024-07-11 LAB
ALBUMIN SERPL BCP-MCNC: 4.2 G/DL (ref 3.4–5)
ALP SERPL-CCNC: 49 U/L (ref 33–110)
ALT SERPL W P-5'-P-CCNC: 32 U/L (ref 7–45)
ANION GAP SERPL CALC-SCNC: 13 MMOL/L (ref 10–20)
AST SERPL W P-5'-P-CCNC: 22 U/L (ref 9–39)
BASOPHILS # BLD AUTO: 0.08 X10*3/UL (ref 0–0.1)
BASOPHILS NFR BLD AUTO: 1.2 %
BILIRUB SERPL-MCNC: 0.4 MG/DL (ref 0–1.2)
BUN SERPL-MCNC: 21 MG/DL (ref 6–23)
CALCIUM SERPL-MCNC: 9.3 MG/DL (ref 8.6–10.3)
CHLORIDE SERPL-SCNC: 103 MMOL/L (ref 98–107)
CHOLEST SERPL-MCNC: 213 MG/DL (ref 0–199)
CHOLESTEROL/HDL RATIO: 4.6
CO2 SERPL-SCNC: 24 MMOL/L (ref 21–32)
CREAT SERPL-MCNC: 1.07 MG/DL (ref 0.5–1.05)
EGFRCR SERPLBLD CKD-EPI 2021: 67 ML/MIN/1.73M*2
EOSINOPHIL # BLD AUTO: 0.15 X10*3/UL (ref 0–0.7)
EOSINOPHIL NFR BLD AUTO: 2.3 %
ERYTHROCYTE [DISTWIDTH] IN BLOOD BY AUTOMATED COUNT: 14.3 % (ref 11.5–14.5)
EST. AVERAGE GLUCOSE BLD GHB EST-MCNC: 120 MG/DL
GLUCOSE SERPL-MCNC: 103 MG/DL (ref 74–99)
HBA1C MFR BLD: 5.8 %
HCT VFR BLD AUTO: 42.6 % (ref 36–46)
HCV AB SER QL: NONREACTIVE
HDLC SERPL-MCNC: 46.7 MG/DL
HGB BLD-MCNC: 13.9 G/DL (ref 12–16)
IMM GRANULOCYTES # BLD AUTO: 0.01 X10*3/UL (ref 0–0.7)
IMM GRANULOCYTES NFR BLD AUTO: 0.2 % (ref 0–0.9)
LDLC SERPL CALC-MCNC: 122 MG/DL
LYMPHOCYTES # BLD AUTO: 1.69 X10*3/UL (ref 1.2–4.8)
LYMPHOCYTES NFR BLD AUTO: 25.6 %
MCH RBC QN AUTO: 29.1 PG (ref 26–34)
MCHC RBC AUTO-ENTMCNC: 32.6 G/DL (ref 32–36)
MCV RBC AUTO: 89 FL (ref 80–100)
MONOCYTES # BLD AUTO: 0.5 X10*3/UL (ref 0.1–1)
MONOCYTES NFR BLD AUTO: 7.6 %
NEUTROPHILS # BLD AUTO: 4.18 X10*3/UL (ref 1.2–7.7)
NEUTROPHILS NFR BLD AUTO: 63.1 %
NON HDL CHOLESTEROL: 166 MG/DL (ref 0–149)
NRBC BLD-RTO: 0 /100 WBCS (ref 0–0)
PLATELET # BLD AUTO: 447 X10*3/UL (ref 150–450)
POTASSIUM SERPL-SCNC: 4.3 MMOL/L (ref 3.5–5.3)
PROT SERPL-MCNC: 6.9 G/DL (ref 6.4–8.2)
RBC # BLD AUTO: 4.78 X10*6/UL (ref 4–5.2)
SODIUM SERPL-SCNC: 136 MMOL/L (ref 136–145)
TRIGL SERPL-MCNC: 221 MG/DL (ref 0–149)
TSH SERPL-ACNC: 1.81 MIU/L (ref 0.44–3.98)
VLDL: 44 MG/DL (ref 0–40)
WBC # BLD AUTO: 6.6 X10*3/UL (ref 4.4–11.3)

## 2024-07-11 PROCEDURE — 80061 LIPID PANEL: CPT

## 2024-07-11 PROCEDURE — 83036 HEMOGLOBIN GLYCOSYLATED A1C: CPT

## 2024-07-11 PROCEDURE — 85025 COMPLETE CBC W/AUTO DIFF WBC: CPT

## 2024-07-11 PROCEDURE — 84443 ASSAY THYROID STIM HORMONE: CPT

## 2024-07-11 PROCEDURE — 36415 COLL VENOUS BLD VENIPUNCTURE: CPT

## 2024-07-11 PROCEDURE — 86038 ANTINUCLEAR ANTIBODIES: CPT

## 2024-07-11 PROCEDURE — 80053 COMPREHEN METABOLIC PANEL: CPT

## 2024-07-11 PROCEDURE — 86803 HEPATITIS C AB TEST: CPT

## 2024-07-16 LAB
ANA PATTERN 2: ABNORMAL
ANA PATTERN: ABNORMAL
ANA SER QL HEP2 SUBST: POSITIVE
ANA TITER 2: ABNORMAL
ANA TITR SER IF: ABNORMAL {TITER}

## 2024-07-17 ENCOUNTER — ANESTHESIA EVENT (OUTPATIENT)
Dept: OPERATING ROOM | Facility: HOSPITAL | Age: 39
End: 2024-07-17
Payer: COMMERCIAL

## 2024-07-18 ASSESSMENT — PROMIS GLOBAL HEALTH SCALE
RATE_QUALITY_OF_LIFE: VERY GOOD
RATE_PHYSICAL_HEALTH: VERY GOOD
CARRYOUT_PHYSICAL_ACTIVITIES: COMPLETELY
EMOTIONAL_PROBLEMS: NEVER
RATE_GENERAL_HEALTH: GOOD
RATE_AVERAGE_PAIN: 3
RATE_SOCIAL_SATISFACTION: VERY GOOD
CARRYOUT_SOCIAL_ACTIVITIES: VERY GOOD
RATE_MENTAL_HEALTH: VERY GOOD
RATE_AVERAGE_FATIGUE: MODERATE

## 2024-07-22 ENCOUNTER — APPOINTMENT (OUTPATIENT)
Dept: PRIMARY CARE | Facility: CLINIC | Age: 39
End: 2024-07-22
Payer: COMMERCIAL

## 2024-07-22 VITALS
SYSTOLIC BLOOD PRESSURE: 124 MMHG | HEIGHT: 64 IN | WEIGHT: 223 LBS | BODY MASS INDEX: 38.07 KG/M2 | HEART RATE: 108 BPM | DIASTOLIC BLOOD PRESSURE: 87 MMHG

## 2024-07-22 DIAGNOSIS — E66.09 CLASS 2 OBESITY DUE TO EXCESS CALORIES WITHOUT SERIOUS COMORBIDITY WITH BODY MASS INDEX (BMI) OF 38.0 TO 38.9 IN ADULT: ICD-10-CM

## 2024-07-22 DIAGNOSIS — F43.23 SITUATIONAL MIXED ANXIETY AND DEPRESSIVE DISORDER: ICD-10-CM

## 2024-07-22 DIAGNOSIS — E78.5 DYSLIPIDEMIA: ICD-10-CM

## 2024-07-22 DIAGNOSIS — M46.1 BILATERAL SACROILIITIS (CMS-HCC): Primary | ICD-10-CM

## 2024-07-22 DIAGNOSIS — N95.1 MENOPAUSAL VASOMOTOR SYNDROME: ICD-10-CM

## 2024-07-22 DIAGNOSIS — D05.11 DUCTAL CARCINOMA IN SITU (DCIS) OF RIGHT BREAST: ICD-10-CM

## 2024-07-22 DIAGNOSIS — R73.02 IGT (IMPAIRED GLUCOSE TOLERANCE): ICD-10-CM

## 2024-07-22 DIAGNOSIS — I10 ESSENTIAL HYPERTENSION: ICD-10-CM

## 2024-07-22 PROCEDURE — 3079F DIAST BP 80-89 MM HG: CPT | Performed by: INTERNAL MEDICINE

## 2024-07-22 PROCEDURE — 3074F SYST BP LT 130 MM HG: CPT | Performed by: INTERNAL MEDICINE

## 2024-07-22 PROCEDURE — 3008F BODY MASS INDEX DOCD: CPT | Performed by: INTERNAL MEDICINE

## 2024-07-22 PROCEDURE — 1036F TOBACCO NON-USER: CPT | Performed by: INTERNAL MEDICINE

## 2024-07-22 PROCEDURE — 99214 OFFICE O/P EST MOD 30 MIN: CPT | Performed by: INTERNAL MEDICINE

## 2024-07-22 RX ORDER — BUPROPION HYDROCHLORIDE 150 MG/1
150 TABLET ORAL EVERY MORNING
Qty: 90 TABLET | Refills: 3 | Status: SHIPPED | OUTPATIENT
Start: 2024-07-22 | End: 2025-07-22

## 2024-07-22 ASSESSMENT — ENCOUNTER SYMPTOMS
LOSS OF SENSATION IN FEET: 0
DEPRESSION: 0
OCCASIONAL FEELINGS OF UNSTEADINESS: 0

## 2024-07-22 ASSESSMENT — COLUMBIA-SUICIDE SEVERITY RATING SCALE - C-SSRS
2. HAVE YOU ACTUALLY HAD ANY THOUGHTS OF KILLING YOURSELF?: NO
6. HAVE YOU EVER DONE ANYTHING, STARTED TO DO ANYTHING, OR PREPARED TO DO ANYTHING TO END YOUR LIFE?: NO
1. IN THE PAST MONTH, HAVE YOU WISHED YOU WERE DEAD OR WISHED YOU COULD GO TO SLEEP AND NOT WAKE UP?: NO

## 2024-07-22 NOTE — ASSESSMENT & PLAN NOTE
Continue clonidine for stabilization of blood pressures in the setting of hot flashes may consider switching to antihypertensive such as thiazide diuretic or ACE inhibitor will continue to follow closely

## 2024-07-22 NOTE — ASSESSMENT & PLAN NOTE
Lab screen for autoimmune disease revealed positive ELIA titers will check autoimmune workup with further testing and consider rheumatology workup and follow-up

## 2024-07-22 NOTE — ASSESSMENT & PLAN NOTE
Patient continues on paroxetine 10 mg nightly for reduction in vasomotor symptoms will restart bupropion 150 mg every morning related to daytime fatigue and mild depression reevaluate with next visit

## 2024-07-22 NOTE — ASSESSMENT & PLAN NOTE
LDL cholesterol of 122 with HDL 46 triglyceride level 221 continue to monitor closely in the setting of mild impaired fasting sugars ASCVD risk score of 1.1%

## 2024-07-22 NOTE — ASSESSMENT & PLAN NOTE
Contrave ineffective.  Patient does not qualify for use of GLP-1 agonist at this time.  Consider initiation of metformin for treatment of IGT   Z Plasty Text: The lesion was extirpated to the level of the fat with a #15 scalpel blade.  Given the location of the defect, shape of the defect and the proximity to free margins a Z-plasty was deemed most appropriate for repair.  Using a sterile surgical marker, the appropriate transposition arms of the Z-plasty were drawn incorporating the defect and placing the expected incisions within the relaxed skin tension lines where possible.    The area thus outlined was incised deep to adipose tissue with a #15 scalpel blade.  The skin margins were undermined to an appropriate distance in all directions utilizing iris scissors.  The opposing transposition arms were then transposed into place in opposite direction and anchored with interrupted buried subcutaneous sutures.

## 2024-07-22 NOTE — PROGRESS NOTES
"Subjective   Reason for Visit: Elizabeth Goss is an 38 y.o. female here for a visit.     Past Medical, Surgical, and Family History reviewed and updated in chart.    Reviewed all medications by prescribing practitioner or clinical pharmacist (such as prescriptions, OTCs, herbal therapies and supplements) and documented in the medical record.    HPI    Patient Care Team:  Abhilash Porter DO as PCP - General  Gisel Stevens RN as Nurse Navigator (Radiology)     Review of Systems   All other systems reviewed and are negative.      Objective   Vitals:  /87 (BP Location: Left arm, Patient Position: Sitting)   Pulse 108   Ht 1.626 m (5' 4\")   Wt 101 kg (223 lb)   BMI 38.28 kg/m²       Physical Exam  Vitals and nursing note reviewed.   Constitutional:       General: She is not in acute distress.     Appearance: Normal appearance. She is well-developed. She is obese. She is not toxic-appearing.   HENT:      Head: Normocephalic and atraumatic.      Right Ear: Tympanic membrane and external ear normal.      Left Ear: Tympanic membrane and external ear normal.      Nose: Nose normal.      Mouth/Throat:      Mouth: Mucous membranes are moist.      Pharynx: Oropharynx is clear. No oropharyngeal exudate or posterior oropharyngeal erythema.      Tonsils: No tonsillar exudate. 2+ on the right. 2+ on the left.   Eyes:      Extraocular Movements: Extraocular movements intact.      Conjunctiva/sclera: Conjunctivae normal.   Cardiovascular:      Rate and Rhythm: Normal rate and regular rhythm.      Pulses: Normal pulses.      Heart sounds: Normal heart sounds. No murmur heard.  Pulmonary:      Effort: Pulmonary effort is normal.      Breath sounds: Normal breath sounds.   Abdominal:      General: Abdomen is flat. Bowel sounds are normal.      Palpations: Abdomen is soft.   Musculoskeletal:      Cervical back: Neck supple.   Lymphadenopathy:      Cervical: No cervical adenopathy.   Skin:     General: Skin is warm and " dry.      Findings: No rash.   Neurological:      Mental Status: She is alert. Mental status is at baseline.   Psychiatric:         Mood and Affect: Mood normal.         Behavior: Behavior normal.         Thought Content: Thought content normal.         Judgment: Judgment normal.         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  Patient does not qualify for use of GLP-1 agonist at this time.  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.11     Pain patient is scheduled for elective bilateral mastectomy for risk reduction inquires about early screening colonoscopy given father with colon carcinoma requiring hemicolectomy patient is increased risk factors for cancer will review with insurance company and consider referral with next evaluation         Situational mixed anxiety and depressive disorder F43.23     Patient continues on paroxetine 10 mg nightly for reduction in vasomotor symptoms will restart bupropion 150 mg every morning related to daytime fatigue and mild depression reevaluate with next visit         Relevant Medications    buPROPion XL  (Wellbutrin XL) 150 mg 24 hr tablet    Other Relevant Orders    Follow Up [facility] - PCP - Established    Bilateral sacroiliitis (CMS-HCC) - Primary M46.1     Lab screen for autoimmune disease revealed positive ELIA titers will check autoimmune workup with further testing and consider rheumatology workup and follow-up         Relevant Orders    ELIA with Reflex to ERICA    Rheumatoid factor    Citrulline Antibody, IgG    Follow Up [facility] - PCP - Established

## 2024-07-22 NOTE — ASSESSMENT & PLAN NOTE
Pain patient is scheduled for elective bilateral mastectomy for risk reduction inquires about early screening colonoscopy given father with colon carcinoma requiring hemicolectomy patient is increased risk factors for cancer will review with insurance company and consider referral with next evaluation

## 2024-07-24 ENCOUNTER — PREP FOR PROCEDURE (OUTPATIENT)
Dept: SURGERY | Facility: CLINIC | Age: 39
End: 2024-07-24
Payer: COMMERCIAL

## 2024-07-24 RX ORDER — CEFAZOLIN SODIUM 2 G/100ML
2 INJECTION, SOLUTION INTRAVENOUS ONCE
Status: CANCELLED | OUTPATIENT
Start: 2024-07-24 | End: 2024-07-24

## 2024-07-24 NOTE — H&P (VIEW-ONLY)
GENERAL SURGERY PREOP H&P     Patient: Elizabeth Goss    Age: 38 y.o.   Gender: female    MRN: [id number]    PCP: Abhilash Porter,           SUBJECTIVE         HPI  Elizabeth presents for bilateral mastectomy with right sentinel lymph node biopsy and stage I reconstructive surgery for right breast cancer.  Since her last office appointment, she did have consultation with plastic surgery and her breast MRI. The area of concern is larger on MRI (6 cm) than on mammogram.  The area of concern also seems to involve the base of the nipple.  BRCA testing was negative.     Original risk factors for breast cancer: 38-year-old white female; menarche at age 14/13; first live birth at age 32; no previous breast biopsy; no first-degree relative with breast cancer.  She is premenopausal.  Paternal great aunt had breast cancer.  No family history of ovarian cancer, pancreatic cancer or prostate cancer.  This gives her a 5-year Shahrzad score of 0.7% and a lifetime risk of 12.5% which puts her just above average risk category.     ROS  Review of Systems   Constitutional:  Negative for chills, fatigue and fever.   HENT:  Negative for congestion, ear pain and hearing loss.    Eyes:  Negative for pain and redness.   Respiratory:  Negative for cough and shortness of breath.    Cardiovascular:  Negative for chest pain and leg swelling.   Gastrointestinal:  Negative for abdominal pain, constipation, diarrhea, nausea and vomiting.   Endocrine: Negative for polyphagia.   Genitourinary:  Negative for dysuria, flank pain, frequency and hematuria.   Musculoskeletal:  Negative for arthralgias and myalgias.   Skin:  Negative for rash and wound.   Allergic/Immunologic: Negative for immunocompromised state.   Neurological:  Negative for speech difficulty, weakness and headaches.   Hematological:  Does not bruise/bleed easily.   Psychiatric/Behavioral:  Negative for agitation and confusion.             HISTORY      Medical History        Past  Medical History:   Diagnosis Date    Body mass index (BMI) 36.0-36.9, adult 01/06/2022     Body mass index (BMI) of 36.0 to 36.9 in adult    Encounter for general adult medical examination without abnormal findings 07/21/2022     Preventative health care    Encounter for general adult medical examination without abnormal findings 11/24/2021     Preventative health care    Encounter for immunization 01/12/2021     Encounter for immunization    Lesion of sciatic nerve, left lower limb 08/18/2021     Piriformis syndrome of left side    Lesion of sciatic nerve, left lower limb       Piriformis syndrome of left side    Menstrual migraine, not intractable, without status migrainosus 08/07/2020     Menstrual migraine without status migrainosus, not intractable    Morbid (severe) obesity due to excess calories (Multi) 01/10/2022     Class 2 severe obesity due to excess calories with serious comorbidity and body mass index (BMI) of 36.0 to 36.9 in adult    Other hypersomnia 08/07/2020     Daytime hypersomnolence    Other psychoactive substance use, 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 (Multi) 08/07/2020     Drug-induced insomnia    Personal history of other diseases of the circulatory system 07/21/2022     History of essential hypertension    Personal history of other endocrine, nutritional and metabolic disease 08/07/2020     History of hypothyroidism    PONV (postoperative nausea and vomiting) 2005            Surgical History         Past Surgical History:   Procedure Laterality Date    OTHER SURGICAL HISTORY   10/23/2019     Breast reduction    OTHER SURGICAL HISTORY   08/07/2020     Tonsillectomy            Family History   No family history on file.         Allergies   No Known Allergies         Tobacco Use History   Social History          Tobacco Use   Smoking Status Never   Smokeless Tobacco Never            Social History           Substance and Sexual Activity   Alcohol Use Not Currently  "    Comment: rarely         HOME MEDICATIONS       Current Outpatient Medications   Medication Instructions    cloNIDine (CATAPRES) 0.1 mg, oral, 2 times daily    drospirenone, contraceptive, (Slynd) 4 mg (28) tablet 1 tablet, oral, Daily    fluticasone (Flonase) 50 mcg/actuation nasal spray 1 spray, nasal, Daily    levothyroxine (SYNTHROID, LEVOXYL) 75 mcg, oral, Daily    PARoxetine (PAXIL) 10 mg, oral, Nightly, Take one tablet by mouth at the bedtime            OBJECTIVE   Last Recorded Vitals.  Blood pressure 129/88, pulse 94, height 1.626 m (5' 4\"), weight 100 kg (221 lb), last menstrual period 05/03/2024, SpO2 96%.      PHYSICAL EXAM  Physical Exam   Previous exam:  General: Well-developed, well-nourished and in no acute distress.  Head: Normocephalic. Atraumatic.  Neck/thyroid: Neck is supple.   Eyes: Pupils equal round and reactive to light. Conjunctiva normal.  ENMT: No masses or deformity of external nose. External ears without masses.  Respiratory/Chest:  Normal respiratory effort.  Breast: Moderate to large sized breasts.  Symmetrical.  No palpable mass of either breast.  No expressible nipple drainage.  Scars consistent with previous breast reduction.  Some denser breast tissue throughout both breast without discernible mass.  Lymphatics: No palpable lymphadenopathy of the cervical, supraclavicular or axillary regions.  Cardiovascular: Regular rate and rhythm.   Abdomen: Soft, nontender, nondistended.   Musculoskeletal: Joints and limbs are grossly normal. Normal gait. Normal range of motion of major joints.  Neuro: Oriented to person, place and time. No obvious neurological deficit. Motor strength grossly normal.  Psych: Normal mood and affect.     RESULTS   Labs  No results found for this or any previous visit (from the past 24 hour(s)).     Radiology Resutls  MR BREAST BILATERAL WITH CONTRAST FULL PROTOCOL;  5/21/2024 8:12 am      ACCESSION NUMBER(S):  [id number]      ORDERING CLINICIAN:  KEVEN" SIEGERT      INDICATION:  Recent diagnosis of right breast cancer, to evaluate extent of  disease. The status post stereotactic guided biopsy of calcifications  in retroareolar region of the right breast on 04/17/2024 with  pathology showing intermediate to high-grade DCIS. History of  bilateral breast reduction.      COMPARISON:  No prior breast MRI is available. Stereotactic 04/17/2024, diagnostic  mammogram and ultrasound 04/11/2024, screening mammogram 03/19/2024.      TECHNIQUE:  Using a dedicated breast coil, STIR axial and T1-weighted fat  saturation axial images of the breasts were obtained, the latter both  before and after intravenous administration of Gadolinium DTPA. On an  independent workstation, 3-D images were formulated using Jibestream  including time enhancement curves, subtraction images and MIP images.      Intravenous contrast: 19.5 mL of Dotarem      FINDINGS:  There is symmetric minimal bilateral background enhancement.      Density:  Scattered fibroglandular tissue.      RIGHT BREAST:  A biopsy marker is seen in the central inferior aspect  of the retroareolar region corresponding to biopsy-proven ductal  carcinoma in-situ. Segmental clumped non mass enhancement is seen  associated with a biopsy clip in central and inferior medial breast  extending from the middle depth to the base of the base of the nipple  spanning approximately 6.0 x 3.3 x 2.8 cm (TR x AP x CC). The extent  of the abnormality is best appreciated on the MIP reconstructed  images (series 510, image 14). The extent of enhancement particularly  in the inferior medial breast is greater than the extent of  calcifications on mammogram. Subtle mammographic focal asymmetry is  questioned on mammogram at this site. The abnormal enhancement  extends to the base of the nipple without suspicious nipple  enhancement. There is no skin or chest wall involvement.      No axillary or internal mammary lymphadenopathy is appreciated.      LEFT  BREAST: Benign postsurgical changes of reduction. No suspicious  mass or nonmass enhancement is identified.      No axillary or internal mammary lymphadenopathy is appreciated.      NON-BREAST FINDINGS:  None.      IMPRESSION:  1. Biopsy-proven malignancy in subareolar right breast with  contiguous non-mass enhancement extending in inferior medial breast  spanning up to 6 cm and extending to the base of the nipple. The  extent of abnormality is greater on MRI compared to calcifications on  mammogram. If breast conservation is pursued, MRI-guided biopsy of  the posterior extent of enhancement is recommended.      2. No evidence of lymphadenopathy.      3. No MRI evidence of malignancy in the left breast.      BI-RADS CATEGORY:  BI-RADS CATEGORY:  6 Known Biopsy-Proven Malignancy.  Recommendation:  Immediate Follow-up.  Recommended Date:  Immediate.  Laterality:  Right.     Pathology       FINAL DIAGNOSIS   A. Right breast calcifications, stereotactic-guided core biopsy:  -- Ductal carcinoma in situ, cribriform and papillary patterns, intermediate to high nuclear grade with focal associated microcalcifications, see note.      Note: Immunohistochemical stains for CK5/6 and estrogen receptor are consistent with an atypical proliferation. Multiple deeper levels were reviewed. ER will be reported in an addendum.          peb   Electronically signed by Zacarias Kumar DO on 4/24/2024 at 1123       [facility]   By the signature on this report, the individual or group listed as making the Final Interpretation/Diagnosis certifies that they have reviewed this case.    Comment   [facility]   Case was shown at Breast Case Review Conference via Zoom meeting with concordance    Addendum   Surgical/Block Number:        [id number] A1 and A4  Specimen Site:         Right breast calcifications  Specimen Type:       Core biopsy     Estrogen Receptor (clone SP1):                     Positive                                                                            Percentage with nuclear staining: >95%                                                                                 ASSESSMENT / PLAN   Diagnoses and all orders for this visit:  Ductal carcinoma in situ (DCIS) of right breast  -     Case Request Operating Room: Bilateral mastectomy with right sentinel lymph node biopsy  Calcification of right breast                Plan  April 2024: RIGHT; retroareolar calc; DCIS; ER+; MRI suggests a 6cm area of involvement including the base of the nipple     1.  Reviewed with the patient that the core needle biopsy of the calcifications is consistent with ductal carcinoma in situ.  Her calcifications are less than 1.5 cm behind the nipple/areolar region.  Unclear as to the extent of DCIS and the involvement of the nipple.  MRI was performed to help determine the extent of disease and possible nipple involvement.  MRI suggested an area of 6 cm with involvement of the base of the nipple.  2.  Her surgical intervention could be more complex that she has already undergone breast reduction surgery previously.   3.  With her younger age, and recent diagnosis of DCIS, she was referred to genetics for consideration of genetic testing.  Her BRCA testing is negative.  4.  She has been seen by plastic surgery who is anticipating staged reconstructive surgery postmastectomy.  5.  Eventually she will need to be referred to medical oncology for endocrine therapy as her DCIS is ER positive.  If she chooses to have a bilateral mastectomy, and no invasive cancer in final pathology, endocrine therapy could be omitted.  Reviewed that pending final results of her surgery (possibility of underlying invasive cancer and/or positive lymph nodes) would determine need for chemotherapy and radiation.  6.  She noted that her  is anticipating traveling in June/July.  Therefore, surgery date has been chosen for 7/25/2024.          Barbara Campos MD, FACS  [facility]  Surgery  [address] [place] [address];   [address]  [place] [id number] [phone]

## 2024-07-24 NOTE — H&P
GENERAL SURGERY PREOP H&P     Patient: lEizabeth Goss    Age: 38 y.o.   Gender: female    MRN: [id number]    PCP: Abhilash Porter,           SUBJECTIVE         HPI  Elizabeth presents for bilateral mastectomy with right sentinel lymph node biopsy and stage I reconstructive surgery for right breast cancer.  Since her last office appointment, she did have consultation with plastic surgery and her breast MRI. The area of concern is larger on MRI (6 cm) than on mammogram.  The area of concern also seems to involve the base of the nipple.  BRCA testing was negative.     Original risk factors for breast cancer: 38-year-old white female; menarche at age [age]; first live birth at age 32; no previous breast biopsy; no first-degree relative with breast cancer.  She is premenopausal.  Paternal great aunt had breast cancer.  No family history of ovarian cancer, pancreatic cancer or prostate cancer.  This gives her a 5-year Shahrzad score of 0.7% and a lifetime risk of 12.5% which puts her just above average risk category.     ROS  Review of Systems   Constitutional:  Negative for chills, fatigue and fever.   HENT:  Negative for congestion, ear pain and hearing loss.    Eyes:  Negative for pain and redness.   Respiratory:  Negative for cough and shortness of breath.    Cardiovascular:  Negative for chest pain and leg swelling.   Gastrointestinal:  Negative for abdominal pain, constipation, diarrhea, nausea and vomiting.   Endocrine: Negative for polyphagia.   Genitourinary:  Negative for dysuria, flank pain, frequency and hematuria.   Musculoskeletal:  Negative for arthralgias and myalgias.   Skin:  Negative for rash and wound.   Allergic/Immunologic: Negative for immunocompromised state.   Neurological:  Negative for speech difficulty, weakness and headaches.   Hematological:  Does not bruise/bleed easily.   Psychiatric/Behavioral:  Negative for agitation and confusion.             HISTORY      Medical History        Past  Medical History:   Diagnosis Date    Body mass index (BMI) 36.0-36.9, adult 01/06/2022     Body mass index (BMI) of 36.0 to 36.9 in adult    Encounter for general adult medical examination without abnormal findings 07/21/2022     Preventative health care    Encounter for general adult medical examination without abnormal findings 11/24/2021     Preventative health care    Encounter for immunization 01/12/2021     Encounter for immunization    Lesion of sciatic nerve, left lower limb 08/18/2021     Piriformis syndrome of left side    Lesion of sciatic nerve, left lower limb       Piriformis syndrome of left side    Menstrual migraine, not intractable, without status migrainosus 08/07/2020     Menstrual migraine without status migrainosus, not intractable    Morbid (severe) obesity due to excess calories (Multi) 01/10/2022     Class 2 severe obesity due to excess calories with serious comorbidity and body mass index (BMI) of 36.0 to 36.9 in adult    Other hypersomnia 08/07/2020     Daytime hypersomnolence    Other psychoactive substance use, 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 (Multi) 08/07/2020     Drug-induced insomnia    Personal history of other diseases of the circulatory system 07/21/2022     History of essential hypertension    Personal history of other endocrine, nutritional and metabolic disease 08/07/2020     History of hypothyroidism    PONV (postoperative nausea and vomiting) 2005            Surgical History         Past Surgical History:   Procedure Laterality Date    OTHER SURGICAL HISTORY   10/23/2019     Breast reduction    OTHER SURGICAL HISTORY   08/07/2020     Tonsillectomy            Family History   No family history on file.         Allergies   No Known Allergies         Tobacco Use History   Social History          Tobacco Use   Smoking Status Never   Smokeless Tobacco Never            Social History           Substance and Sexual Activity   Alcohol Use Not Currently  "    Comment: rarely         HOME MEDICATIONS       Current Outpatient Medications   Medication Instructions    cloNIDine (CATAPRES) 0.1 mg, oral, 2 times daily    drospirenone, contraceptive, (Slynd) 4 mg (28) tablet 1 tablet, oral, Daily    fluticasone (Flonase) 50 mcg/actuation nasal spray 1 spray, nasal, Daily    levothyroxine (SYNTHROID, LEVOXYL) 75 mcg, oral, Daily    PARoxetine (PAXIL) 10 mg, oral, Nightly, Take one tablet by mouth at the bedtime            OBJECTIVE   Last Recorded Vitals.  Blood pressure 129/88, pulse 94, height 1.626 m (5' 4\"), weight 100 kg (221 lb), last menstrual period 05/03/2024, SpO2 96%.      PHYSICAL EXAM  Physical Exam   Previous exam:  General: Well-developed, well-nourished and in no acute distress.  Head: Normocephalic. Atraumatic.  Neck/thyroid: Neck is supple.   Eyes: Pupils equal round and reactive to light. Conjunctiva normal.  ENMT: No masses or deformity of external nose. External ears without masses.  Respiratory/Chest:  Normal respiratory effort.  Breast: Moderate to large sized breasts.  Symmetrical.  No palpable mass of either breast.  No expressible nipple drainage.  Scars consistent with previous breast reduction.  Some denser breast tissue throughout both breast without discernible mass.  Lymphatics: No palpable lymphadenopathy of the cervical, supraclavicular or axillary regions.  Cardiovascular: Regular rate and rhythm.   Abdomen: Soft, nontender, nondistended.   Musculoskeletal: Joints and limbs are grossly normal. Normal gait. Normal range of motion of major joints.  Neuro: Oriented to person, place and time. No obvious neurological deficit. Motor strength grossly normal.  Psych: Normal mood and affect.     RESULTS   Labs  No results found for this or any previous visit (from the past 24 hour(s)).     Radiology Resutls  MR BREAST BILATERAL WITH CONTRAST FULL PROTOCOL;  5/21/2024 8:12 am      ACCESSION NUMBER(S):  [id number]      ORDERING CLINICIAN:  KEVEN" SIEGERT      INDICATION:  Recent diagnosis of right breast cancer, to evaluate extent of  disease. The status post stereotactic guided biopsy of calcifications  in retroareolar region of the right breast on 04/17/2024 with  pathology showing intermediate to high-grade DCIS. History of  bilateral breast reduction.      COMPARISON:  No prior breast MRI is available. Stereotactic 04/17/2024, diagnostic  mammogram and ultrasound 04/11/2024, screening mammogram 03/19/2024.      TECHNIQUE:  Using a dedicated breast coil, STIR axial and T1-weighted fat  saturation axial images of the breasts were obtained, the latter both  before and after intravenous administration of Gadolinium DTPA. On an  independent workstation, 3-D images were formulated using Gnip  including time enhancement curves, subtraction images and MIP images.      Intravenous contrast: 19.5 mL of Dotarem      FINDINGS:  There is symmetric minimal bilateral background enhancement.      Density:  Scattered fibroglandular tissue.      RIGHT BREAST:  A biopsy marker is seen in the central inferior aspect  of the retroareolar region corresponding to biopsy-proven ductal  carcinoma in-situ. Segmental clumped non mass enhancement is seen  associated with a biopsy clip in central and inferior medial breast  extending from the middle depth to the base of the base of the nipple  spanning approximately 6.0 x 3.3 x 2.8 cm (TR x AP x CC). The extent  of the abnormality is best appreciated on the MIP reconstructed  images (series 510, image 14). The extent of enhancement particularly  in the inferior medial breast is greater than the extent of  calcifications on mammogram. Subtle mammographic focal asymmetry is  questioned on mammogram at this site. The abnormal enhancement  extends to the base of the nipple without suspicious nipple  enhancement. There is no skin or chest wall involvement.      No axillary or internal mammary lymphadenopathy is appreciated.      LEFT  BREAST: Benign postsurgical changes of reduction. No suspicious  mass or nonmass enhancement is identified.      No axillary or internal mammary lymphadenopathy is appreciated.      NON-BREAST FINDINGS:  None.      IMPRESSION:  1. Biopsy-proven malignancy in subareolar right breast with  contiguous non-mass enhancement extending in inferior medial breast  spanning up to 6 cm and extending to the base of the nipple. The  extent of abnormality is greater on MRI compared to calcifications on  mammogram. If breast conservation is pursued, MRI-guided biopsy of  the posterior extent of enhancement is recommended.      2. No evidence of lymphadenopathy.      3. No MRI evidence of malignancy in the left breast.      BI-RADS CATEGORY:  BI-RADS CATEGORY:  6 Known Biopsy-Proven Malignancy.  Recommendation:  Immediate Follow-up.  Recommended Date:  Immediate.  Laterality:  Right.     Pathology       FINAL DIAGNOSIS   A. Right breast calcifications, stereotactic-guided core biopsy:  -- Ductal carcinoma in situ, cribriform and papillary patterns, intermediate to high nuclear grade with focal associated microcalcifications, see note.      Note: Immunohistochemical stains for CK5/6 and estrogen receptor are consistent with an atypical proliferation. Multiple deeper levels were reviewed. ER will be reported in an addendum.          peb   Electronically signed by Zacarias Kumar DO on 4/24/2024 at 1123       [facility]   By the signature on this report, the individual or group listed as making the Final Interpretation/Diagnosis certifies that they have reviewed this case.    Comment   [facility]   Case was shown at Breast Case Review Conference via Zoom meeting with concordance    Addendum   Surgical/Block Number:        [id number] A1 and A4  Specimen Site:         Right breast calcifications  Specimen Type:       Core biopsy     Estrogen Receptor (clone SP1):                     Positive                                                                            Percentage with nuclear staining: >95%                                                                                 ASSESSMENT / PLAN   Diagnoses and all orders for this visit:  Ductal carcinoma in situ (DCIS) of right breast  -     Case Request Operating Room: Bilateral mastectomy with right sentinel lymph node biopsy  Calcification of right breast                Plan  April 2024: RIGHT; retroareolar calc; DCIS; ER+; MRI suggests a 6cm area of involvement including the base of the nipple     1.  Reviewed with the patient that the core needle biopsy of the calcifications is consistent with ductal carcinoma in situ.  Her calcifications are less than 1.5 cm behind the nipple/areolar region.  Unclear as to the extent of DCIS and the involvement of the nipple.  MRI was performed to help determine the extent of disease and possible nipple involvement.  MRI suggested an area of 6 cm with involvement of the base of the nipple.  2.  Her surgical intervention could be more complex that she has already undergone breast reduction surgery previously.   3.  With her younger age, and recent diagnosis of DCIS, she was referred to genetics for consideration of genetic testing.  Her BRCA testing is negative.  4.  She has been seen by plastic surgery who is anticipating staged reconstructive surgery postmastectomy.  5.  Eventually she will need to be referred to medical oncology for endocrine therapy as her DCIS is ER positive.  If she chooses to have a bilateral mastectomy, and no invasive cancer in final pathology, endocrine therapy could be omitted.  Reviewed that pending final results of her surgery (possibility of underlying invasive cancer and/or positive lymph nodes) would determine need for chemotherapy and radiation.  6.  She noted that her  is anticipating traveling in June/July.  Therefore, surgery date has been chosen for 7/25/2024.          Barbara Campos MD, FACS  [facility]  Surgery  [address] [place] [address];   [address]; [address]  [place] [id number] [phone]

## 2024-07-25 ENCOUNTER — HOSPITAL ENCOUNTER (OUTPATIENT)
Facility: HOSPITAL | Age: 39
Discharge: HOME | End: 2024-07-26
Attending: SURGERY | Admitting: SURGERY
Payer: COMMERCIAL

## 2024-07-25 ENCOUNTER — ANESTHESIA (OUTPATIENT)
Dept: OPERATING ROOM | Facility: HOSPITAL | Age: 39
End: 2024-07-25
Payer: COMMERCIAL

## 2024-07-25 ENCOUNTER — HOSPITAL ENCOUNTER (OUTPATIENT)
Dept: RADIOLOGY | Facility: HOSPITAL | Age: 39
Setting detail: OUTPATIENT SURGERY
Discharge: HOME | End: 2024-07-25
Payer: COMMERCIAL

## 2024-07-25 DIAGNOSIS — D05.11 DUCTAL CARCINOMA IN SITU (DCIS) OF RIGHT BREAST: Primary | ICD-10-CM

## 2024-07-25 DIAGNOSIS — D05.11 DUCTAL CARCINOMA IN SITU (DCIS) OF RIGHT BREAST: ICD-10-CM

## 2024-07-25 PROBLEM — Z98.890 S/P BREAST RECONSTRUCTION, BILATERAL: Status: ACTIVE | Noted: 2024-07-25

## 2024-07-25 LAB — PREGNANCY TEST URINE, POC: NEGATIVE

## 2024-07-25 PROCEDURE — 81025 URINE PREGNANCY TEST: CPT | Performed by: ANESTHESIOLOGY

## 2024-07-25 PROCEDURE — 3700000001 HC GENERAL ANESTHESIA TIME - INITIAL BASE CHARGE: Performed by: SURGERY

## 2024-07-25 PROCEDURE — 2500000004 HC RX 250 GENERAL PHARMACY W/ HCPCS (ALT 636 FOR OP/ED): Mod: JZ | Performed by: SURGERY

## 2024-07-25 PROCEDURE — 2500000001 HC RX 250 WO HCPCS SELF ADMINISTERED DRUGS (ALT 637 FOR MEDICARE OP): Performed by: STUDENT IN AN ORGANIZED HEALTH CARE EDUCATION/TRAINING PROGRAM

## 2024-07-25 PROCEDURE — A4649 SURGICAL SUPPLIES: HCPCS | Performed by: SURGERY

## 2024-07-25 PROCEDURE — 3430000001 HC RX 343 DIAGNOSTIC RADIOPHARMACEUTICALS: Performed by: NUCLEAR MEDICINE

## 2024-07-25 PROCEDURE — 7100000001 HC RECOVERY ROOM TIME - INITIAL BASE CHARGE: Performed by: SURGERY

## 2024-07-25 PROCEDURE — 2500000005 HC RX 250 GENERAL PHARMACY W/O HCPCS

## 2024-07-25 PROCEDURE — 3600000009 HC OR TIME - EACH INCREMENTAL 1 MINUTE - PROCEDURE LEVEL FOUR: Performed by: SURGERY

## 2024-07-25 PROCEDURE — A9520 TC99 TILMANOCEPT DIAG 0.5MCI: HCPCS | Performed by: NUCLEAR MEDICINE

## 2024-07-25 PROCEDURE — 3600000004 HC OR TIME - INITIAL BASE CHARGE - PROCEDURE LEVEL FOUR: Performed by: SURGERY

## 2024-07-25 PROCEDURE — G0378 HOSPITAL OBSERVATION PER HR: HCPCS

## 2024-07-25 PROCEDURE — 2500000004 HC RX 250 GENERAL PHARMACY W/ HCPCS (ALT 636 FOR OP/ED): Performed by: ANESTHESIOLOGY

## 2024-07-25 PROCEDURE — 38900 IO MAP OF SENT LYMPH NODE: CPT | Performed by: SURGERY

## 2024-07-25 PROCEDURE — 7100000002 HC RECOVERY ROOM TIME - EACH INCREMENTAL 1 MINUTE: Performed by: SURGERY

## 2024-07-25 PROCEDURE — 38792 RA TRACER ID OF SENTINL NODE: CPT

## 2024-07-25 PROCEDURE — 38525 BIOPSY/REMOVAL LYMPH NODES: CPT | Performed by: SURGERY

## 2024-07-25 PROCEDURE — 3700000002 HC GENERAL ANESTHESIA TIME - EACH INCREMENTAL 1 MINUTE: Performed by: SURGERY

## 2024-07-25 PROCEDURE — 2500000001 HC RX 250 WO HCPCS SELF ADMINISTERED DRUGS (ALT 637 FOR MEDICARE OP): Performed by: SURGERY

## 2024-07-25 PROCEDURE — 38792 RA TRACER ID OF SENTINL NODE: CPT | Performed by: NUCLEAR MEDICINE

## 2024-07-25 PROCEDURE — 2500000004 HC RX 250 GENERAL PHARMACY W/ HCPCS (ALT 636 FOR OP/ED): Performed by: NURSE ANESTHETIST, CERTIFIED REGISTERED

## 2024-07-25 PROCEDURE — 2500000002 HC RX 250 W HCPCS SELF ADMINISTERED DRUGS (ALT 637 FOR MEDICARE OP, ALT 636 FOR OP/ED): Performed by: SURGERY

## 2024-07-25 PROCEDURE — 19303 MAST SIMPLE COMPLETE: CPT | Performed by: SURGERY

## 2024-07-25 PROCEDURE — 2500000004 HC RX 250 GENERAL PHARMACY W/ HCPCS (ALT 636 FOR OP/ED): Performed by: SURGERY

## 2024-07-25 PROCEDURE — 2780000003 HC OR 278 NO HCPCS: Performed by: SURGERY

## 2024-07-25 PROCEDURE — C1789 PROSTHESIS, BREAST, IMP: HCPCS | Performed by: SURGERY

## 2024-07-25 PROCEDURE — 2720000007 HC OR 272 NO HCPCS: Performed by: SURGERY

## 2024-07-25 PROCEDURE — 2500000005 HC RX 250 GENERAL PHARMACY W/O HCPCS: Performed by: NURSE ANESTHETIST, CERTIFIED REGISTERED

## 2024-07-25 DEVICE — IMPLANTABLE DEVICE: Type: IMPLANTABLE DEVICE | Site: BREAST | Status: FUNCTIONAL

## 2024-07-25 DEVICE — NATRELLE TE SMOOTH 133S-MV-15-T (US)
Type: IMPLANTABLE DEVICE | Site: BREAST | Status: FUNCTIONAL
Brand: NATRELLE 133S TISSUE EXPANDERS

## 2024-07-25 RX ORDER — LEVOTHYROXINE SODIUM 75 UG/1
75 TABLET ORAL DAILY
Status: DISCONTINUED | OUTPATIENT
Start: 2024-07-26 | End: 2024-07-26 | Stop reason: HOSPADM

## 2024-07-25 RX ORDER — OXYCODONE AND ACETAMINOPHEN 5; 325 MG/1; MG/1
1 TABLET ORAL EVERY 4 HOURS PRN
Status: DISCONTINUED | OUTPATIENT
Start: 2024-07-25 | End: 2024-07-25 | Stop reason: HOSPADM

## 2024-07-25 RX ORDER — ACETAMINOPHEN 325 MG/1
650 TABLET ORAL EVERY 6 HOURS
Status: DISCONTINUED | OUTPATIENT
Start: 2024-07-25 | End: 2024-07-26 | Stop reason: HOSPADM

## 2024-07-25 RX ORDER — MIDAZOLAM HYDROCHLORIDE 1 MG/ML
INJECTION, SOLUTION INTRAMUSCULAR; INTRAVENOUS AS NEEDED
Status: DISCONTINUED | OUTPATIENT
Start: 2024-07-25 | End: 2024-07-25

## 2024-07-25 RX ORDER — HYDRALAZINE HYDROCHLORIDE 20 MG/ML
5 INJECTION INTRAMUSCULAR; INTRAVENOUS EVERY 30 MIN PRN
Status: DISCONTINUED | OUTPATIENT
Start: 2024-07-25 | End: 2024-07-25 | Stop reason: HOSPADM

## 2024-07-25 RX ORDER — DEXMEDETOMIDINE IN 0.9 % NACL 20 MCG/5ML
SYRINGE (ML) INTRAVENOUS AS NEEDED
Status: DISCONTINUED | OUTPATIENT
Start: 2024-07-25 | End: 2024-07-25

## 2024-07-25 RX ORDER — SODIUM CHLORIDE, SODIUM LACTATE, POTASSIUM CHLORIDE, CALCIUM CHLORIDE 600; 310; 30; 20 MG/100ML; MG/100ML; MG/100ML; MG/100ML
100 INJECTION, SOLUTION INTRAVENOUS CONTINUOUS
Status: DISCONTINUED | OUTPATIENT
Start: 2024-07-25 | End: 2024-07-25 | Stop reason: HOSPADM

## 2024-07-25 RX ORDER — BUPROPION HYDROCHLORIDE 150 MG/1
150 TABLET ORAL EVERY MORNING
Status: DISCONTINUED | OUTPATIENT
Start: 2024-07-26 | End: 2024-07-26 | Stop reason: HOSPADM

## 2024-07-25 RX ORDER — LABETALOL HYDROCHLORIDE 5 MG/ML
5 INJECTION, SOLUTION INTRAVENOUS ONCE AS NEEDED
Status: DISCONTINUED | OUTPATIENT
Start: 2024-07-25 | End: 2024-07-25 | Stop reason: HOSPADM

## 2024-07-25 RX ORDER — LIDOCAINE HYDROCHLORIDE 10 MG/ML
0.1 INJECTION, SOLUTION EPIDURAL; INFILTRATION; INTRACAUDAL; PERINEURAL ONCE
Status: DISCONTINUED | OUTPATIENT
Start: 2024-07-25 | End: 2024-07-25 | Stop reason: HOSPADM

## 2024-07-25 RX ORDER — FLUTICASONE PROPIONATE 50 MCG
1 SPRAY, SUSPENSION (ML) NASAL DAILY
Status: DISCONTINUED | OUTPATIENT
Start: 2024-07-25 | End: 2024-07-26 | Stop reason: HOSPADM

## 2024-07-25 RX ORDER — FENTANYL CITRATE 50 UG/ML
INJECTION, SOLUTION INTRAMUSCULAR; INTRAVENOUS AS NEEDED
Status: DISCONTINUED | OUTPATIENT
Start: 2024-07-25 | End: 2024-07-25

## 2024-07-25 RX ORDER — ONDANSETRON HYDROCHLORIDE 2 MG/ML
INJECTION, SOLUTION INTRAVENOUS AS NEEDED
Status: DISCONTINUED | OUTPATIENT
Start: 2024-07-25 | End: 2024-07-25

## 2024-07-25 RX ORDER — FAMOTIDINE 10 MG/ML
20 INJECTION INTRAVENOUS ONCE
Status: COMPLETED | OUTPATIENT
Start: 2024-07-25 | End: 2024-07-25

## 2024-07-25 RX ORDER — CEPHALEXIN 500 MG/1
500 CAPSULE ORAL EVERY 8 HOURS SCHEDULED
Status: DISCONTINUED | OUTPATIENT
Start: 2024-07-25 | End: 2024-07-26 | Stop reason: HOSPADM

## 2024-07-25 RX ORDER — PROPOFOL 10 MG/ML
INJECTION, EMULSION INTRAVENOUS AS NEEDED
Status: DISCONTINUED | OUTPATIENT
Start: 2024-07-25 | End: 2024-07-25

## 2024-07-25 RX ORDER — LIDOCAINE HYDROCHLORIDE 20 MG/ML
INJECTION, SOLUTION INFILTRATION; PERINEURAL AS NEEDED
Status: DISCONTINUED | OUTPATIENT
Start: 2024-07-25 | End: 2024-07-25

## 2024-07-25 RX ORDER — PAROXETINE 10 MG/1
10 TABLET, FILM COATED ORAL NIGHTLY
Status: DISCONTINUED | OUTPATIENT
Start: 2024-07-25 | End: 2024-07-26 | Stop reason: HOSPADM

## 2024-07-25 RX ORDER — CEFAZOLIN SODIUM 2 G/100ML
2 INJECTION, SOLUTION INTRAVENOUS ONCE
Status: COMPLETED | OUTPATIENT
Start: 2024-07-25 | End: 2024-07-25

## 2024-07-25 RX ORDER — ONDANSETRON HYDROCHLORIDE 2 MG/ML
4 INJECTION, SOLUTION INTRAVENOUS ONCE
Status: COMPLETED | OUTPATIENT
Start: 2024-07-25 | End: 2024-07-25

## 2024-07-25 RX ORDER — LIDOCAINE HCL/PF 100 MG/5ML
SYRINGE (ML) INTRAVENOUS AS NEEDED
Status: DISCONTINUED | OUTPATIENT
Start: 2024-07-25 | End: 2024-07-25

## 2024-07-25 RX ORDER — MORPHINE SULFATE 2 MG/ML
2 INJECTION, SOLUTION INTRAMUSCULAR; INTRAVENOUS EVERY 5 MIN PRN
Status: DISCONTINUED | OUTPATIENT
Start: 2024-07-25 | End: 2024-07-25 | Stop reason: HOSPADM

## 2024-07-25 RX ORDER — ALBUTEROL SULFATE 0.83 MG/ML
2.5 SOLUTION RESPIRATORY (INHALATION) ONCE AS NEEDED
Status: DISCONTINUED | OUTPATIENT
Start: 2024-07-25 | End: 2024-07-25 | Stop reason: HOSPADM

## 2024-07-25 RX ORDER — METOCLOPRAMIDE HYDROCHLORIDE 5 MG/ML
INJECTION INTRAMUSCULAR; INTRAVENOUS AS NEEDED
Status: DISCONTINUED | OUTPATIENT
Start: 2024-07-25 | End: 2024-07-25

## 2024-07-25 RX ORDER — CLONIDINE HYDROCHLORIDE 0.1 MG/1
0.1 TABLET ORAL 2 TIMES DAILY
Status: DISCONTINUED | OUTPATIENT
Start: 2024-07-25 | End: 2024-07-26 | Stop reason: HOSPADM

## 2024-07-25 RX ORDER — HYDROMORPHONE HYDROCHLORIDE 1 MG/ML
INJECTION, SOLUTION INTRAMUSCULAR; INTRAVENOUS; SUBCUTANEOUS AS NEEDED
Status: DISCONTINUED | OUTPATIENT
Start: 2024-07-25 | End: 2024-07-25

## 2024-07-25 RX ORDER — DIPHENHYDRAMINE HYDROCHLORIDE 50 MG/ML
12.5 INJECTION INTRAMUSCULAR; INTRAVENOUS ONCE AS NEEDED
Status: DISCONTINUED | OUTPATIENT
Start: 2024-07-25 | End: 2024-07-25 | Stop reason: HOSPADM

## 2024-07-25 RX ORDER — DIAZEPAM 5 MG/1
5 TABLET ORAL EVERY 6 HOURS PRN
Status: DISCONTINUED | OUTPATIENT
Start: 2024-07-25 | End: 2024-07-26 | Stop reason: HOSPADM

## 2024-07-25 RX ORDER — SODIUM CHLORIDE, SODIUM LACTATE, POTASSIUM CHLORIDE, CALCIUM CHLORIDE 600; 310; 30; 20 MG/100ML; MG/100ML; MG/100ML; MG/100ML
100 INJECTION, SOLUTION INTRAVENOUS CONTINUOUS
Status: DISCONTINUED | OUTPATIENT
Start: 2024-07-25 | End: 2024-07-26

## 2024-07-25 RX ORDER — DROPERIDOL 2.5 MG/ML
0.62 INJECTION, SOLUTION INTRAMUSCULAR; INTRAVENOUS ONCE AS NEEDED
Status: DISCONTINUED | OUTPATIENT
Start: 2024-07-25 | End: 2024-07-25 | Stop reason: HOSPADM

## 2024-07-25 RX ORDER — DOCUSATE SODIUM 100 MG/1
100 CAPSULE, LIQUID FILLED ORAL 2 TIMES DAILY
Status: DISCONTINUED | OUTPATIENT
Start: 2024-07-25 | End: 2024-07-26 | Stop reason: HOSPADM

## 2024-07-25 RX ORDER — OXYCODONE HYDROCHLORIDE 5 MG/1
5 TABLET ORAL EVERY 4 HOURS PRN
Status: DISCONTINUED | OUTPATIENT
Start: 2024-07-25 | End: 2024-07-26 | Stop reason: HOSPADM

## 2024-07-25 RX ORDER — SCOLOPAMINE TRANSDERMAL SYSTEM 1 MG/1
1 PATCH, EXTENDED RELEASE TRANSDERMAL ONCE
Status: DISCONTINUED | OUTPATIENT
Start: 2024-07-25 | End: 2024-07-26 | Stop reason: HOSPADM

## 2024-07-25 RX ORDER — ROCURONIUM BROMIDE 10 MG/ML
INJECTION, SOLUTION INTRAVENOUS AS NEEDED
Status: DISCONTINUED | OUTPATIENT
Start: 2024-07-25 | End: 2024-07-25

## 2024-07-25 RX ORDER — ONDANSETRON HYDROCHLORIDE 2 MG/ML
4 INJECTION, SOLUTION INTRAVENOUS ONCE AS NEEDED
Status: DISCONTINUED | OUTPATIENT
Start: 2024-07-25 | End: 2024-07-25 | Stop reason: HOSPADM

## 2024-07-25 RX ORDER — SODIUM CHLORIDE, SODIUM LACTATE, POTASSIUM CHLORIDE, CALCIUM CHLORIDE 600; 310; 30; 20 MG/100ML; MG/100ML; MG/100ML; MG/100ML
125 INJECTION, SOLUTION INTRAVENOUS CONTINUOUS
Status: DISCONTINUED | OUTPATIENT
Start: 2024-07-25 | End: 2024-07-26 | Stop reason: HOSPADM

## 2024-07-25 SDOH — HEALTH STABILITY: MENTAL HEALTH: CURRENT SMOKER: 0

## 2024-07-25 SDOH — SOCIAL STABILITY: SOCIAL INSECURITY: ARE THERE ANY APPARENT SIGNS OF INJURIES/BEHAVIORS THAT COULD BE RELATED TO ABUSE/NEGLECT?: NO

## 2024-07-25 SDOH — SOCIAL STABILITY: SOCIAL INSECURITY: HAVE YOU HAD THOUGHTS OF HARMING ANYONE ELSE?: NO

## 2024-07-25 SDOH — SOCIAL STABILITY: SOCIAL INSECURITY: DO YOU FEEL UNSAFE GOING BACK TO THE PLACE WHERE YOU ARE LIVING?: NO

## 2024-07-25 SDOH — SOCIAL STABILITY: SOCIAL INSECURITY: DOES ANYONE TRY TO KEEP YOU FROM HAVING/CONTACTING OTHER FRIENDS OR DOING THINGS OUTSIDE YOUR HOME?: NO

## 2024-07-25 SDOH — SOCIAL STABILITY: SOCIAL INSECURITY: HAS ANYONE EVER THREATENED TO HURT YOUR FAMILY OR YOUR PETS?: NO

## 2024-07-25 SDOH — SOCIAL STABILITY: SOCIAL INSECURITY: HAVE YOU HAD ANY THOUGHTS OF HARMING ANYONE ELSE?: NO

## 2024-07-25 SDOH — SOCIAL STABILITY: SOCIAL INSECURITY: WERE YOU ABLE TO COMPLETE ALL THE BEHAVIORAL HEALTH SCREENINGS?: YES

## 2024-07-25 SDOH — SOCIAL STABILITY: SOCIAL INSECURITY: DO YOU FEEL ANYONE HAS EXPLOITED OR TAKEN ADVANTAGE OF YOU FINANCIALLY OR OF YOUR PERSONAL PROPERTY?: NO

## 2024-07-25 SDOH — SOCIAL STABILITY: SOCIAL INSECURITY: ABUSE: ADULT

## 2024-07-25 SDOH — SOCIAL STABILITY: SOCIAL INSECURITY: ARE YOU OR HAVE YOU BEEN THREATENED OR ABUSED PHYSICALLY, EMOTIONALLY, OR SEXUALLY BY ANYONE?: NO

## 2024-07-25 ASSESSMENT — PAIN SCALES - GENERAL
PAINLEVEL_OUTOF10: 0 - NO PAIN
PAINLEVEL_OUTOF10: 0 - NO PAIN
PAINLEVEL_OUTOF10: 8
PAINLEVEL_OUTOF10: 0 - NO PAIN
PAINLEVEL_OUTOF10: 8
PAIN_LEVEL: 5
PAINLEVEL_OUTOF10: 7
PAINLEVEL_OUTOF10: 4
PAINLEVEL_OUTOF10: 7
PAINLEVEL_OUTOF10: 7

## 2024-07-25 ASSESSMENT — PATIENT HEALTH QUESTIONNAIRE - PHQ9
1. LITTLE INTEREST OR PLEASURE IN DOING THINGS: NOT AT ALL
2. FEELING DOWN, DEPRESSED OR HOPELESS: NOT AT ALL
SUM OF ALL RESPONSES TO PHQ9 QUESTIONS 1 & 2: 0

## 2024-07-25 ASSESSMENT — LIFESTYLE VARIABLES
HOW MANY STANDARD DRINKS CONTAINING ALCOHOL DO YOU HAVE ON A TYPICAL DAY: PATIENT DOES NOT DRINK
AUDIT-C TOTAL SCORE: 0
HOW OFTEN DO YOU HAVE A DRINK CONTAINING ALCOHOL: NEVER
AUDIT-C TOTAL SCORE: 0
HOW OFTEN DO YOU HAVE 6 OR MORE DRINKS ON ONE OCCASION: NEVER
SKIP TO QUESTIONS 9-10: 1

## 2024-07-25 ASSESSMENT — PAIN - FUNCTIONAL ASSESSMENT
PAIN_FUNCTIONAL_ASSESSMENT: 0-10

## 2024-07-25 ASSESSMENT — COGNITIVE AND FUNCTIONAL STATUS - GENERAL
STANDING UP FROM CHAIR USING ARMS: A LITTLE
DAILY ACTIVITIY SCORE: 22
MOBILITY SCORE: 18
MOVING FROM LYING ON BACK TO SITTING ON SIDE OF FLAT BED WITH BEDRAILS: A LITTLE
TURNING FROM BACK TO SIDE WHILE IN FLAT BAD: A LITTLE
MOVING TO AND FROM BED TO CHAIR: A LITTLE
DRESSING REGULAR LOWER BODY CLOTHING: A LITTLE
CLIMB 3 TO 5 STEPS WITH RAILING: A LITTLE
WALKING IN HOSPITAL ROOM: A LITTLE
DRESSING REGULAR UPPER BODY CLOTHING: A LITTLE
PATIENT BASELINE BEDBOUND: NO

## 2024-07-25 ASSESSMENT — ACTIVITIES OF DAILY LIVING (ADL)
TOILETING: INDEPENDENT
HEARING - RIGHT EAR: FUNCTIONAL
BATHING: INDEPENDENT
GROOMING: INDEPENDENT
HEARING - LEFT EAR: FUNCTIONAL
DRESSING YOURSELF: INDEPENDENT
LACK_OF_TRANSPORTATION: NO
JUDGMENT_ADEQUATE_SAFELY_COMPLETE_DAILY_ACTIVITIES: YES
PATIENT'S MEMORY ADEQUATE TO SAFELY COMPLETE DAILY ACTIVITIES?: YES
ADEQUATE_TO_COMPLETE_ADL: YES
WALKS IN HOME: INDEPENDENT
FEEDING YOURSELF: INDEPENDENT

## 2024-07-25 ASSESSMENT — PAIN DESCRIPTION - LOCATION: LOCATION: CHEST

## 2024-07-25 ASSESSMENT — PAIN DESCRIPTION - ORIENTATION: ORIENTATION: RIGHT;LEFT

## 2024-07-25 NOTE — OP NOTE
Bilateral mastectomy with right sentinel lymph node biopsy (B) Operative Note     Date: 2024  OR Location: [facility]    Name: Elizabeth Goss, : 1985, Age: 38 y.o., MRN: [id number], Sex: female    Diagnosis  Pre-op Diagnosis      * Ductal carcinoma in situ (DCIS) of right breast [D05.11] Post-op Diagnosis     * Ductal carcinoma in situ (DCIS) of right breast [D05.11]     Procedures  1.  Bilateral mastectomy  2.  Right sentinel lymph node biopsy  3.  Injection for sentinel lymph node biopsy    Surgeons   Barbara Campos MD, FACS    Resident/Fellow/Other Assistant:  Riaz Jett DO    Procedure Summary  Anesthesia: General  ASA: III  Anesthesia Staff: CRNA: KATY Mejia-CRNA  SRNA: Mattie Sherwood RN  Estimated Blood Loss: 50mL  Intra-op Medications:   Administrations occurring from 1000 to 1530 on 24:   Medication Name Total Dose   ceFAZolin (Ancef) 2 g in dextrose (iso)  mL 4 g   lactated Ringer's infusion 290 mL              Anesthesia Record               Intraprocedure I/O Totals          Intake    lactated Ringer's infusion 1000.00 mL    Total Intake 1000 mL          Specimen:   ID Type Source Tests Collected by Time   1 : RIGHT SENTINEL LYMPH NODE #1 Tissue SENTINEL LYMPH NODE BREAST RIGHT SURGICAL PATHOLOGY EXAM Barbara Campos MD 2024 1138   2 : RIGHT BREAST MASTECTOMY SHORT STITCH SUPERIOR LONG STITCH LATERAL Tissue BREAST MASTECTOMY RIGHT SURGICAL PATHOLOGY EXAM Barbara Campos MD 2024 1206   3 : RIGHT SENTINEL LYMPH NODE #2 Tissue SENTINEL LYMPH NODE BREAST RIGHT SURGICAL PATHOLOGY EXAM Barbara aCmpos MD 2024 1219   4 : RIGHT SENTINEL LYMPH NODE #3 Tissue SENTINEL LYMPH NODE BREAST RIGHT SURGICAL PATHOLOGY EXAM Barbara Campos MD 2024 1220   5 : LEFT BREAST MASTECTOMY SHORT STITCH SUPERIOR LONG STITCH LATERAL Tissue BREAST MASTECTOMY LEFT SURGICAL PATHOLOGY EXAM Barbara Campos MD 2024 1306        Staff:   Circulator:  Yessi  Scrub Person: Floridalma Porras Circulator: Fiorella  Scrub Person: Liberty         Drains and/or Catheters: * None in log *    Tourniquet Times:         Implants:     Findings: 3 sentinel lymph nodes    Indications: Elizabeth Goss is an 38 y.o. female who is having surgery for Ductal carcinoma in situ (DCIS) of right breast [D05.11].  She had had a 6 cm area involving the base of the nipple identified by breast MRI.  She elected to undergo bilateral mastectomy with right sentinel lymph node biopsy and stage I reconstructive surgery.  In the preop holding area, she had injection from nuclear medicine.  Consent was signed.  She had been previously marked by plastic surgery.    The patient was seen in the preoperative area. The risks, benefits, complications, treatment options, non-operative alternatives, expected recovery and outcomes were discussed with the patient. The possibilities of reaction to medication, pulmonary aspiration, injury to surrounding structures, bleeding, recurrent infection, the need for additional procedures, failure to diagnose a condition, and creating a complication requiring transfusion or operation were discussed with the patient. The patient concurred with the proposed plan, giving informed consent.  The site of surgery was properly noted/marked if necessary per policy. The patient has been actively warmed in preoperative area. Preoperative antibiotics have been ordered and given within 1 hours of incision. Venous thrombosis prophylaxis have been ordered including bilateral sequential compression devices    Procedure Details:   She was brought into the operating room and was laid in the supine position.  After placement under general anesthesia, TITUS hose and SCDs were placed on bilateral lower extremities.  A stack of towels was placed behind the right shoulder to open up the right axillary region.  5 cc of methylene blue was injected into the lateral right periareolar region.   Massage was undertaken for 5 minutes.  Both breast and axillary regions then were prepped with ChloraPrep and draped in usual sterile fashion.  A pause was taken the correct patient procedure were identified.    Attention was first placed towards the right breast.  The neoprobe was used to identify the breast count as 104,060.  There appeared to be a slightly hot lymph node just lateral to the pectoralis region.  At this time the markings made by the plastic surgeon for the skin sparing mastectomy were opened using a 10 blade.  Standard mastectomy flaps were made in all directions.  Of note, the planes inferiorly were obscured due to her previous breast reduction surgery.  The dissection went superior to the clavicle, medial to the sternum, inferior to the inframamillary fold and out lateral past the pectoralis muscle.  Once the dissection was complete, the right breast was taken off of the pectoralis muscle to include the breast tissue and underlying pectoralis fascia and the specimen.  The breast was taken off in a medial to lateral fashion.  Care was taken to take the extra breast tissue between the pectoralis major and pectoralis minor muscles.  The specimen then was marked with a short stitch superior, and a long stitch lateral.  The cavity was irrigated and there was no evidence of any active bleeding.  A moistened lap pad then was placed over the pectoralis muscle.    Attention now was placed towards the axilla.  There appeared to be a low-lying blue lymph node.  This was dissected out and eventually identified a sentinel lymph node #1 and lymph node #2.  [place] lymph node #1 was blue, palpable and measured 753.  [place] lymph node #2 was blue, palpable and measured 156.  On further evaluation a third sentinel lymph node was identified a little higher in the axilla underneath the pectoralis muscle.  This was a blue lymph node, not palpable and a count of 156.  No other hot lymph nodes were identified in  the axilla as there were no counts greater than 100.  Further evaluation did not identify any other blue nodes or palpable lymph nodes.  This time the axilla was irrigated and there was no evidence of any bleeding.  A moistened gauze was placed into the axilla until the time of the reconstruction.  This area then was covered with a blue towel.    The patient was redraped and a clean back table was brought up.  Attention now was placed towards the left breast. At this time the markings made by the plastic surgeon for the skin sparing mastectomy were opened using a 10 blade.  Standard mastectomy flaps were made in all directions.  Of note, the planes inferiorly were obscured due to her previous breast reduction surgery.  The dissection went superior to the clavicle, medial to the sternum, inferior to the inframamillary fold and out lateral past the pectoralis muscle.  Once the dissection was complete, the right breast was taken off of the pectoralis muscle to include the breast tissue and underlying pectoralis fascia and the specimen.  The breast was taken off in a medial to lateral fashion.  Care was taken to take the extra breast tissue between the pectoralis major and pectoralis minor muscles.  The specimen then was marked with a short stitch superior, and a long stitch lateral.  The cavity was irrigated and there was no evidence of any active bleeding.  A moistened lap pad then was placed over the pectoralis muscle.  With completion of both mastectomies and the right sentinel lymph node biopsy, the case was turned over to plastic surgery for stage I reconstruction.  The patient was left intubated and under general anesthesia.    Counts at the end of my portion of the procedure: Correct  Complications: None  Drains: None    Complications:  None; patient tolerated the procedure well.    Disposition: PACU - hemodynamically stable.  Condition: stable     [place] Node Biopsy for Breast Cancer - Right  Operation  performed with curative intent Yes   Tracer(s) used to identify sentinel nodes in the upfront surgery (non-neoadjuvant) setting Dye and Radioactive tracer   Tracer(s) used to identify sentinel nodes in the neoadjuvant setting N/A   All nodes (colored or non-colored) present at the end of a dye-filled lymphatic channel were removed Yes   All significantly radioactive nodes were removed Yes   All palpably suspicious nodes were removed Yes   Biopsy-proven positive nodes marked with clips prior to chemotherapy were identified and removed N/A             Additional Details:     Attending Attestation: I was present for the entire procedure.    Barbara Campos  Phone Number: [phone]

## 2024-07-25 NOTE — OP NOTE
Bilateral Breast Recon prepec synthetic with placement of Bilateral alloderm and expanders and prevena vacs (B) Operative Note     Date: 2024  OR Location: [facility]    Name: Elizabeth Goss, : 1985, Age: 38 y.o., MRN: [id number], Sex: female    Diagnosis  Pre-op Diagnosis      * Ductal carcinoma in situ (DCIS) of right breast [D05.11] Post-op Diagnosis     * Ductal carcinoma in situ (DCIS) of right breast [D05.11]     Procedures  Bilateral mastectomy with right sentinel lymph node biopsy  32945 - MD MASTECTOMY SIMPLE COMPLETE    STAGE 1 BILATERAL BREAST RECONSTRUCTION WITH PLACEMENT OF TISSUE EXPANDERS AND ALLODERM  37043 - MD TISSUE EXPANDER PLACEMENT BREAST RECONSTRUCTION    STAGE 1 BILATERAL BREAST RECONSTRUCTION WITH PLACEMENT OF TISSUE EXPANDERS AND ALLODERM  75781 - MD IMPLNT BIO IMPLNT FOR SOFT TISSUE REINFORCEMENT  Stage 1 prepec synthetic breast recon bilateral   Bilateral placement of alloderm  Bilateral placement of expanders  Placement fo prevena vac closed incision management system    Surgeons   Thaddeus    Resident/Fellow/Other Assistant:  Surgeons and Role:  * No surgeons found with a matching role *  Jozef ISBELL    Procedure Summary  Anesthesia: General  ASA: III  Anesthesia Staff: CRNA: KATY Mejia-CRNA  SRNA: Mattie Sherwood RN  Estimated Blood Loss: 12mL  Intra-op Medications:   Administrations occurring from 1000 to 1530 on 24:   Medication Name Total Dose   lactated Ringer's infusion 290 mL   ceFAZolin (Ancef) 2 g in dextrose (iso)  mL 2 g     Specimen: none  Staff:   Circulator: Yessi  Scrub Person: Floridalma Porras Circulator: Fiorella  Scrub Person: Liberty    Drains and/or Catheters: * None in log *  2 10 flat MOISES per side    Implants: [id number] 600cc [id number] right; left [id number]  Alloderm lot right [id number] and left [id number]  Final fill volume right was 480cc and left 510cc    Findings: satisfactory flap thickness and bimanual negative; right weight  844g and left 671g    Indications: Elizabeth Gsos is an 38 y.o. female who is having surgery for Ductal carcinoma in situ (DCIS) of right breast [D05.11]. Additional risks discussed related to reasonable expectations and healing due to previous mammaplasty reduction and the nature of scars (H pattern).    The patient was seen in the preoperative area. The risks, benefits, complications, treatment options, non-operative alternatives, expected recovery and outcomes were discussed with the patient. The possibilities of reaction to medication, pulmonary aspiration, injury to surrounding structures, bleeding, recurrent infection, the need for additional procedures, failure to diagnose a condition, and creating a complication requiring transfusion or operation were discussed with the patient. The patient concurred with the proposed plan, giving informed consent.  The site of surgery was properly noted/marked if necessary per policy. The patient has been actively warmed in preoperative area. Preoperative antibiotics have been ordered and given within 1 hours of incision. Venous thrombosis prophylaxis have been ordered including bilateral sequential compression devices    Procedure Details: I arrived after SLNB and BL mastectomy.  A timeout was done.  Patient already had SCDs and preop abx.  Irrigated the pocket with NS.  Flap thickness was satisfactory.  Cautery used to dissect thorax and pocket.  Alloderm restore large medium thick perf was used for bottom and top and sutured with 0 ethibond to IMF and pec major.  Expander was chosen [id number] and with new gloves removed air and placed 200cc of NS.  It was brought into prepectoral position and fixated with the alloderm with 0 ethibond.  4 prong needle used to fill with normal saline and final fill volume was 480cc.  This allowed for tension free closure and also took up the dead space.  2 10 flat MOISES drains were placed in axilla and IMF and secured with 2-0 ethilon.   Temporary staples later removed to approximate and cone deformity and trimming of the margins for nicer contour.  Layered closure with 3-0 moncryl deep, deep dermal and 4-0 moncryl running subcuticular was done.      The left side was approached identically and symmetrically and this was a bilateral procedure total fill volume on left was 510cc.      Prevena Vacs placed with good suction. CHG patches and drain sponges with surgibra and steries was done.   Complications:  None; patient tolerated the procedure well.    Disposition: PACU - hemodynamically stable.  Condition: stable     Operative time: 1hr 20min    Attending Attestation: I was present and scrubbed for the entire procedure.    Curry Travis, DO

## 2024-07-25 NOTE — ANESTHESIA PREPROCEDURE EVALUATION
Patient: Elizabeth Goss    Procedure Information       Date/Time: 07/25/24 1000    Procedures:       Bilateral mastectomy with right sentinel lymph node biopsy (Bilateral) - (PREOP 7:30, NUCLEAR 8:30, SURGERY 9:30)  9:30 AM; 3 hours + Dr. Travis hours  (Dr. Travis to follow with stage I reconstructive surgery)      STAGE 1 BILATERAL BREAST RECONSTRUCTION WITH PLACEMENT OF TISSUE EXPANDERS AND ALLODERM (Bilateral: Breast) - STAGE 1 BILATERAL BREAST RECONSTRUCTION WITH PLACEMENT OF TISSUE EXPANDERS AND ALLODERM    Location: [facility] 01 / [facility]    Surgeons: Barbara Campos MD; Curry Travis, DO            Relevant Problems   Cardiac   (+) Essential hypertension      Neuro   (+) Situational mixed anxiety and depressive disorder      GI   (+) Irritable bowel syndrome with both constipation and diarrhea      Endocrine   (+) Class 2 obesity due to excess calories without serious comorbidity with body mass index (BMI) of 38.0 to 38.9 in adult   (+) Primary hypothyroidism       Clinical information reviewed:   Tobacco  Allergies  Meds  Problems  Med Hx  Surg Hx   Fam Hx  Soc   Hx        NPO Detail:  NPO/Void Status  Date of Last Liquid: 07/24/24  Time of Last Liquid: 2000  Date of Last Solid: 07/24/24  Time of Last Solid: 2000         Physical Exam    Airway  Mallampati: II  TM distance: >3 FB  Neck ROM: full     Cardiovascular - normal exam     Dental - normal exam     Pulmonary - normal exam     Abdominal   (+) obese         Anesthesia Plan    History of general anesthesia?: yes  History of complications of general anesthesia?: no    ASA 3     general     The patient is not a current smoker.    intravenous induction   Postoperative administration of opioids is intended.  Anesthetic plan and risks discussed with patient.  Use of blood products discussed with patient who.    Plan discussed with CRNA.

## 2024-07-25 NOTE — ANESTHESIA POSTPROCEDURE EVALUATION
Patient: Elizabeth Goss    Procedure Summary       Date: 07/25/24 Room / Location: [facility] 01 / [facility]    Anesthesia Start: 1026 Anesthesia Stop: 1507    Procedures:       Bilateral mastectomy with right sentinel lymph node biopsy (Bilateral)      STAGE 1 BILATERAL BREAST RECONSTRUCTION WITH PLACEMENT OF TISSUE EXPANDERS AND ALLODERM (Bilateral: Breast) Diagnosis:       Ductal carcinoma in situ (DCIS) of right breast      (Ductal carcinoma in situ (DCIS) of right breast [D05.11])    Surgeons: Barbara Campos MD; Curry Travis DO Responsible Provider: MEAGHAN Mejia    Anesthesia Type: general ASA Status: 3            Anesthesia Type: general    Vitals Value Taken Time   /75 07/25/24 1558   Temp 36.2 °C (97.1 °F) 07/25/24 1458   Pulse 99 07/25/24 1558   Resp 21 07/25/24 1558   SpO2 96 % 07/25/24 1607       Anesthesia Post Evaluation    Patient location during evaluation: PACU  Patient participation: complete - patient participated  Level of consciousness: awake  Pain score: 5  Pain management: adequate  Airway patency: patent  Cardiovascular status: acceptable  Respiratory status: acceptable  Hydration status: acceptable  Postoperative Nausea and Vomiting: none    There were no known notable events for this encounter.

## 2024-07-25 NOTE — ANESTHESIA PROCEDURE NOTES
Airway  Date/Time: 7/25/2024 10:35 AM  Urgency: elective    Airway not difficult    Staffing  Performed: SRNA   Authorized by: MEAGHAN Mejia    Performed by: MEAGHAN Mejia  Patient location during procedure: OR    Indications and Patient Condition  Indications for airway management: anesthesia  Spontaneous ventilation: present  Sedation level: deep  Preoxygenated: yes  Patient position: sniffing  Mask difficulty assessment: 1 - vent by mask    Final Airway Details  Final airway type: endotracheal airway      Successful airway: ETT  Cuffed: yes   Successful intubation technique: video laryngoscopy  Facilitating devices/methods: intubating stylet  Endotracheal tube insertion site: oral  Blade: Tal  Blade size: #3  ETT size (mm): 7.5  Cormack-Lehane Classification: grade I - full view of glottis  Placement verified by: chest auscultation and capnometry   Measured from: lips  ETT to lips (cm): 24  Number of attempts at approach: 1

## 2024-07-25 NOTE — CARE PLAN
Problem: Pain - Adult  Goal: Verbalizes/displays adequate comfort level or baseline comfort level  Outcome: Progressing     Problem: Safety - Adult  Goal: Free from fall injury  Outcome: Progressing     Problem: Discharge Planning  Goal: Discharge to home or other facility with appropriate resources  Outcome: Progressing     Problem: Chronic Conditions and Co-morbidities  Goal: Patient's chronic conditions and co-morbidity symptoms are monitored and maintained or improved  Outcome: Progressing     Problem: Pain  Goal: Takes deep breaths with improved pain control throughout the shift  Outcome: Progressing  Goal: Turns in bed with improved pain control throughout the shift  Outcome: Progressing  Goal: Walks with improved pain control throughout the shift  Outcome: Progressing  Goal: Performs ADL's with improved pain control throughout shift  Outcome: Progressing  Goal: Participates in PT with improved pain control throughout the shift  Outcome: Progressing  Goal: Free from opioid side effects throughout the shift  Outcome: Progressing  Goal: Free from acute confusion related to pain meds throughout the shift  Outcome: Progressing    The patient's goals for the shift include      The clinical goals for the shift include Patient will have adequate pain management during shift

## 2024-07-26 ENCOUNTER — PHARMACY VISIT (OUTPATIENT)
Dept: PHARMACY | Facility: CLINIC | Age: 39
End: 2024-07-26
Payer: MEDICARE

## 2024-07-26 VITALS
DIASTOLIC BLOOD PRESSURE: 94 MMHG | RESPIRATION RATE: 17 BRPM | SYSTOLIC BLOOD PRESSURE: 160 MMHG | TEMPERATURE: 98.4 F | HEART RATE: 76 BPM | BODY MASS INDEX: 38.07 KG/M2 | HEIGHT: 64 IN | WEIGHT: 223 LBS | OXYGEN SATURATION: 96 %

## 2024-07-26 PROBLEM — D05.11 DUCTAL CARCINOMA IN SITU OF RIGHT BREAST: Status: RESOLVED | Noted: 2024-07-26 | Resolved: 2024-07-26

## 2024-07-26 PROBLEM — D05.11 DUCTAL CARCINOMA IN SITU OF RIGHT BREAST: Status: ACTIVE | Noted: 2024-07-26

## 2024-07-26 PROCEDURE — 2500000001 HC RX 250 WO HCPCS SELF ADMINISTERED DRUGS (ALT 637 FOR MEDICARE OP): Performed by: STUDENT IN AN ORGANIZED HEALTH CARE EDUCATION/TRAINING PROGRAM

## 2024-07-26 PROCEDURE — 2500000004 HC RX 250 GENERAL PHARMACY W/ HCPCS (ALT 636 FOR OP/ED): Performed by: SURGERY

## 2024-07-26 PROCEDURE — G0378 HOSPITAL OBSERVATION PER HR: HCPCS

## 2024-07-26 PROCEDURE — 7100000011 HC EXTENDED STAY RECOVERY HOURLY - NURSING UNIT

## 2024-07-26 PROCEDURE — RXMED WILLOW AMBULATORY MEDICATION CHARGE

## 2024-07-26 PROCEDURE — 2500000001 HC RX 250 WO HCPCS SELF ADMINISTERED DRUGS (ALT 637 FOR MEDICARE OP): Performed by: SURGERY

## 2024-07-26 PROCEDURE — 99024 POSTOP FOLLOW-UP VISIT: CPT | Performed by: SURGERY

## 2024-07-26 RX ORDER — CEPHALEXIN 500 MG/1
500 CAPSULE ORAL EVERY 8 HOURS SCHEDULED
Qty: 30 CAPSULE | Refills: 0 | Status: SHIPPED | OUTPATIENT
Start: 2024-07-26 | End: 2024-08-05

## 2024-07-26 RX ORDER — DIAZEPAM 5 MG/1
5 TABLET ORAL EVERY 8 HOURS PRN
Qty: 9 TABLET | Refills: 0 | Status: SHIPPED | OUTPATIENT
Start: 2024-07-26 | End: 2024-08-09 | Stop reason: ALTCHOICE

## 2024-07-26 RX ORDER — OXYCODONE AND ACETAMINOPHEN 5; 325 MG/1; MG/1
1 TABLET ORAL EVERY 6 HOURS PRN
Qty: 10 TABLET | Refills: 0 | Status: SHIPPED | OUTPATIENT
Start: 2024-07-26 | End: 2024-08-09 | Stop reason: ALTCHOICE

## 2024-07-26 SDOH — ECONOMIC STABILITY: HOUSING INSECURITY: IN THE PAST 12 MONTHS HAS THE ELECTRIC, GAS, OIL, OR WATER COMPANY THREATENED TO SHUT OFF SERVICES IN YOUR HOME?: NO

## 2024-07-26 SDOH — ECONOMIC STABILITY: FOOD INSECURITY

## 2024-07-26 SDOH — ECONOMIC STABILITY: TRANSPORTATION INSECURITY: IN THE PAST 12 MONTHS, HAS LACK OF TRANSPORTATION KEPT YOU FROM MEDICAL APPOINTMENTS OR FROM GETTING MEDICATIONS?: NO

## 2024-07-26 SDOH — ECONOMIC STABILITY: FOOD INSECURITY: WITHIN THE PAST 12 MONTHS, YOU WORRIED THAT YOUR FOOD WOULD RUN OUT BEFORE YOU GOT MONEY TO BUY MORE.: NEVER TRUE

## 2024-07-26 SDOH — ECONOMIC STABILITY: TRANSPORTATION INSECURITY
IN THE PAST 12 MONTHS, HAS THE LACK OF TRANSPORTATION KEPT YOU FROM MEDICAL APPOINTMENTS OR FROM GETTING MEDICATIONS?: NO

## 2024-07-26 SDOH — ECONOMIC STABILITY: INCOME INSECURITY: IN THE LAST 12 MONTHS, WAS THERE A TIME WHEN YOU WERE NOT ABLE TO PAY THE MORTGAGE OR RENT ON TIME?: NO

## 2024-07-26 SDOH — ECONOMIC STABILITY: HOUSING INSECURITY: IN THE LAST 12 MONTHS, WAS THERE A TIME WHEN YOU WERE NOT ABLE TO PAY THE MORTGAGE OR RENT ON TIME?: NO

## 2024-07-26 SDOH — ECONOMIC STABILITY: TRANSPORTATION INSECURITY
IN THE PAST 12 MONTHS, HAS LACK OF TRANSPORTATION KEPT YOU FROM MEETINGS, WORK, OR FROM GETTING THINGS NEEDED FOR DAILY LIVING?: NO

## 2024-07-26 SDOH — ECONOMIC STABILITY: TRANSPORTATION INSECURITY

## 2024-07-26 SDOH — ECONOMIC STABILITY: GENERAL

## 2024-07-26 SDOH — ECONOMIC STABILITY: FOOD INSECURITY: WITHIN THE PAST 12 MONTHS, THE FOOD YOU BOUGHT JUST DIDN'T LAST AND YOU DIDN'T HAVE MONEY TO GET MORE.: NEVER TRUE

## 2024-07-26 SDOH — ECONOMIC STABILITY: FOOD INSECURITY: WITHIN THE PAST 12 MONTHS, YOU WORRIED THAT YOUR FOOD WOULD RUN OUT BEFORE YOU GOT THE MONEY TO BUY MORE.: NEVER TRUE

## 2024-07-26 SDOH — ECONOMIC STABILITY: HOUSING INSECURITY
IN THE LAST 12 MONTHS, WAS THERE A TIME WHEN YOU DID NOT HAVE A STEADY PLACE TO SLEEP OR SLEPT IN A SHELTER (INCLUDING NOW)?: NO

## 2024-07-26 SDOH — ECONOMIC STABILITY: HOUSING INSECURITY

## 2024-07-26 SDOH — ECONOMIC STABILITY: HOUSING INSECURITY: IN THE LAST 12 MONTHS, HOW MANY PLACES HAVE YOU LIVED?: 1

## 2024-07-26 ASSESSMENT — COGNITIVE AND FUNCTIONAL STATUS - GENERAL
DAILY ACTIVITIY SCORE: 24
MOBILITY SCORE: 24

## 2024-07-26 ASSESSMENT — PAIN DESCRIPTION - ORIENTATION: ORIENTATION: RIGHT;LEFT

## 2024-07-26 ASSESSMENT — PAIN SCALES - GENERAL
PAINLEVEL_OUTOF10: 0 - NO PAIN
PAINLEVEL_OUTOF10: 7
PAINLEVEL_OUTOF10: 4
PAINLEVEL_OUTOF10: 4
PAINLEVEL_OUTOF10: 7

## 2024-07-26 ASSESSMENT — PAIN DESCRIPTION - DESCRIPTORS: DESCRIPTORS: ACHING

## 2024-07-26 ASSESSMENT — PAIN - FUNCTIONAL ASSESSMENT
PAIN_FUNCTIONAL_ASSESSMENT: 0-10
PAIN_FUNCTIONAL_ASSESSMENT: 0-10

## 2024-07-26 ASSESSMENT — SOCIAL DETERMINANTS OF HEALTH (SDOH): IN THE PAST 12 MONTHS, HAS THE ELECTRIC, GAS, OIL, OR WATER COMPANY THREATENED TO SHUT OFF SERVICE IN YOUR HOME?: NO

## 2024-07-26 ASSESSMENT — ACTIVITIES OF DAILY LIVING (ADL): LACK_OF_TRANSPORTATION: NO

## 2024-07-26 ASSESSMENT — PAIN SCALES - PAIN ASSESSMENT IN ADVANCED DEMENTIA (PAINAD): TOTALSCORE: MEDICATION (SEE MAR)

## 2024-07-26 ASSESSMENT — PAIN DESCRIPTION - LOCATION: LOCATION: BREAST

## 2024-07-26 NOTE — DISCHARGE SUMMARY
Discharge Diagnosis  Ductal carcinoma in situ (DCIS) of right breast    Issues Requiring Follow-Up  Follow up breast pathology- with Dr. Campos  Wound check/stitch removal- with Dr. Travis    Test Results Pending At Discharge  Pending Labs       Order Current Status    Surgical Pathology Exam In process            Hospital Course   Patient is a 38-year-old female who underwent bilateral breast mastectomy with right-sided sentinel lymph node biopsy and immediate reconstruction with Rasta Campos and Thaddeus.  Patient admitted postoperatively.  For further details please refer to operative report.  Patient did well on the floor progressed throughout stay. On POD1 , the patient was deemed fit for discharge. At the time of discharge, the patient was ambulating ad linwood, tolerating a regular diet, and pain was well-controlled on an oral regimen. The patient was educated on postoperative activity restrictions, wound care, and pain management. The patient will be discharged home with his/her family in stable condition.     Pertinent Physical Exam At Time of Discharge  Physical Exam  HENT:      Head: Normocephalic.      Nose: Nose normal.      Mouth/Throat:      Mouth: Mucous membranes are moist.   Eyes:      Pupils: Pupils are equal, round, and reactive to light.   Pulmonary:      Effort: Pulmonary effort is normal.      Comments: Bilateral Sagrario's on mastectomy incisions.  4 MOISES drains to on either side with serosanguineous output no signs of flank hematoma or swelling near incision  Abdominal:      General: Abdomen is flat.      Palpations: Abdomen is soft.   Musculoskeletal:         General: Normal range of motion.   Skin:     General: Skin is warm.      Capillary Refill: Capillary refill takes less than 2 seconds.   Neurological:      General: No focal deficit present.      Mental Status: She is alert.   Psychiatric:         Mood and Affect: Mood normal.         Home Medications     Medication List      START taking  these medications     cephalexin 500 mg capsule; Commonly known as: Keflex; Take 1 capsule   (500 mg) by mouth every 8 hours for 10 days.   diazePAM 5 mg tablet; Commonly known as: Valium; Take 1 tablet (5 mg) by   mouth every 8 hours if needed for anxiety for up to 9 doses.   oxyCODONE-acetaminophen 5-325 mg tablet; Commonly known as: Percocet;   Take 1 tablet by mouth every 6 hours if needed for severe pain (7 - 10)   for up to 20 doses.     CONTINUE taking these medications     buPROPion  mg 24 hr tablet; Commonly known as: Wellbutrin XL; Take   1 tablet (150 mg) by mouth once daily in the morning. Do not crush, chew,   or split.   cloNIDine 0.1 mg tablet; Commonly known as: Catapres; Take 1 tablet (0.1   mg) by mouth 2 times a day.   fluticasone 50 mcg/actuation nasal spray; Commonly known as: Flonase   levothyroxine 75 mcg tablet; Commonly known as: Synthroid, Levoxyl; Take   1 tablet (75 mcg) by mouth once daily.   PARoxetine 10 mg tablet; Commonly known as: PaxiL; Take 1 tablet (10 mg)   by mouth once daily at bedtime. Take one tablet by mouth at the bedtime   Slynd 4 mg (28) tablet; Generic drug: drospirenone (contraceptive); Take   1 tablet by mouth once daily.       Outpatient Follow-Up  7/31/24 with Dr. Travis (Plastic surgery)    Future Appointments   Date Time Provider Department [place]   8/9/2024  9:15 AM Barbara Campos MD [id number] [facility]   9/9/2024  1:20 PM KATY Aldridge-CNP [id number] [facility]   9/10/2024  9:00 AM Tray García MD [id number] [facility]   10/28/2024  8:00 AM Abhilash Porter DO [id number] [facility]       Barbara Campos MD

## 2024-07-26 NOTE — DISCHARGE INSTRUCTIONS
DISCHARGE INSTRUCTIONS    Patient: Elizabeth Goss  Surgery Date: 7/25/2024    Age: 38 y.o.   Gender: female  Attending: Barbara Campos MD    MRN: [id number]  OR Location: [facility]    PCP: Abhilash Porter DO           SURGERY INFORMATION   Procedure performed: Procedure(s):  Bilateral mastectomy with right sentinel lymph node biopsy  STAGE 1 BILATERAL BREAST RECONSTRUCTION WITH PLACEMENT OF TISSUE EXPANDERS AND ALLODERM   Post-Op diagnosis: Post-op Diagnosis     * Ductal carcinoma in situ (DCIS) of right breast [D05.11]   Surgeon: Panel 1:     * Barbara Campos - Primary  Panel 2:     * Curry Travis - Primary     ACTIVITY RESTRICTIONS   1.  You may not drive while taking narcotic pain medication.  2.  Continue wearing the compression stockings (TITUS hose) until you are walking at least 3 times per day.    WOUND CARE / DRAIN CARE   Per Dr. Travis:  1. Wear your bra 24 hours a day.  Adjust the straps and apply padding if it is uncomfortable or pressing too hard on incision  2.  If you are discharged with the hospital of your drains and record the amount that you empty for every 4 hours or one 1/3 of full.  Total output in milliliters.  For each 24-hour period on drainage record sheet.  We want to know this amount.  The drainage can vary in color from yellowish to red.  3.  Once your drains are removed fluid can leak from the drain holes this can occur when walking or rolling over in bed and can saturate your close or linens.  This is normal  4.  You may shower 24 hours after your drains are removed.  Do not tub bathe or use hot water in the shower.  Warm water is okay  5.  Each day apply a small amount of antibiotic ointment bacitracin or Neosporin to the drain holes.  6.  Take medications as prescribed.  7.  No driving, lifting, or strenuous activity  8.  Follow-up at our Dr. Saxena office for appointment in 1 week for exam and drain removal.     MEDICATIONS   A narcotic pain medication has been  prescribed.  Use this medication only AS NEEDED for severe pain.  2.   You may use Tylenol in addition to, or instead of, your narcotic pain medication.  3.   Resume all home medications unless previously discussed with your surgeon. Blood thinners can be restarted the day after your surgery unless there is significant bleeding.    DIET   Diet as tolerated.   For the first 24 hours after surgery, it is recommended that you eat light meals.  Some nausea and vomiting is common for the first 24 hours after surgery.  Drink plenty of fluids.  Minimize your use of caffeinated beverages.    CALLL YOUR SURGEON IF:   Any evidence of infection at your sugical site which can include redness or drainage. Some clear or pinkish drainage is normal for a few days following surgery.  2.   Excessive bleeding from your surgical site. If there is a small amount of bleeding, apply pressure for 20 minutes, then recheck the wound. If the bleeding does not stop, please call your surgeon's office.  3.   Some nausea and vomiting is expected for the first 24 hours after surgery. If you are unable to keep down fluids, or the nausea/vomiting continues beyond 24 hours.  4.   If you are unable to urinate within 8-12 hours after discharge from the hospital.  5.   A low-grade fever after surgery is normal. Notify the office if your temperature goes above 101 degrees.  6.   Any other concerns or questions you have regarding your surgery.      Barbara Campos MD, FACS  [facility] General Surgery  [address] [place] [address];   [address]  [place]  [address] [phone]

## 2024-07-26 NOTE — CARE PLAN
The patient's goals for the shift include      The clinical goals for the shift include maintain patient safety    Problem: Pain - Adult  Goal: Verbalizes/displays adequate comfort level or baseline comfort level  Outcome: Progressing     Problem: Safety - Adult  Goal: Free from fall injury  Outcome: Progressing     Problem: Discharge Planning  Goal: Discharge to home or other facility with appropriate resources  Outcome: Progressing     Problem: Chronic Conditions and Co-morbidities  Goal: Patient's chronic conditions and co-morbidity symptoms are monitored and maintained or improved  Outcome: Progressing     Problem: Pain  Goal: Takes deep breaths with improved pain control throughout the shift  Outcome: Progressing  Goal: Turns in bed with improved pain control throughout the shift  Outcome: Progressing  Goal: Walks with improved pain control throughout the shift  Outcome: Progressing  Goal: Performs ADL's with improved pain control throughout shift  Outcome: Progressing  Goal: Participates in PT with improved pain control throughout the shift  Outcome: Progressing  Goal: Free from opioid side effects throughout the shift  Outcome: Progressing  Goal: Free from acute confusion related to pain meds throughout the shift  Outcome: Progressing

## 2024-07-26 NOTE — NURSING NOTE
This RN spoke with patient about discharge protocol and being wheeled down upon discharge. Patient stated she was leaving today and appreciate all her care.

## 2024-07-26 NOTE — NURSING NOTE
Elizabeth Goss seen for post-mastectomy education with drain care teaching at provider request. Review of Breast Surgery: What to expect, in addition to post-operative drain record sheet with patient and spouse Jake including family at bedside for support at patient request. Spouse Jake included in teaching at patient request. All questions answered. Trouble shooting tips for bilateral Prevena Vac reviewed. Demonstration of drain care included, patient and spouse able to return demonstration after review. Information related to patient and family support services provided to patient at previous encounter. Elizabeth states she has had an opportunity to review  those resources and has no further support needs at this time.       Gisel Stevens, JARETN RN  Breast Care Coordinator

## 2024-07-26 NOTE — NURSING NOTE
Patient discharged per physician order. Patient verbalized understanding of discharge instructions and copy of instructions sent with patient. Patient educated on percocet and the use of excess tylenol. Patient educated on wound care per the nursing navigator. IV taken out x1 per policy with catheter tip intact.

## 2024-08-07 DIAGNOSIS — N95.1 HOT FLASHES DUE TO MENOPAUSE: ICD-10-CM

## 2024-08-07 LAB
LAB AP ASR DISCLAIMER: NORMAL
LAB AP BLOCK FOR ADDITIONAL STUDIES: NORMAL
LABORATORY COMMENT REPORT: NORMAL
PATH REPORT.FINAL DX SPEC: NORMAL
PATH REPORT.GROSS SPEC: NORMAL
PATH REPORT.RELEVANT HX SPEC: NORMAL
PATH REPORT.TOTAL CANCER: NORMAL
PATHOLOGY SYNOPTIC REPORT: NORMAL

## 2024-08-07 RX ORDER — PAROXETINE 10 MG/1
10 TABLET, FILM COATED ORAL NIGHTLY
Qty: 90 TABLET | Refills: 3 | Status: SHIPPED | OUTPATIENT
Start: 2024-08-07 | End: 2025-08-07

## 2024-08-09 ENCOUNTER — OFFICE VISIT (OUTPATIENT)
Dept: SURGERY | Facility: CLINIC | Age: 39
End: 2024-08-09
Payer: COMMERCIAL

## 2024-08-09 ENCOUNTER — APPOINTMENT (OUTPATIENT)
Dept: OBSTETRICS AND GYNECOLOGY | Facility: CLINIC | Age: 39
End: 2024-08-09
Payer: COMMERCIAL

## 2024-08-09 VITALS
OXYGEN SATURATION: 97 % | WEIGHT: 223 LBS | SYSTOLIC BLOOD PRESSURE: 121 MMHG | HEIGHT: 64 IN | BODY MASS INDEX: 38.07 KG/M2 | HEART RATE: 88 BPM | DIASTOLIC BLOOD PRESSURE: 87 MMHG

## 2024-08-09 DIAGNOSIS — Z13.71 BRCA NEGATIVE: ICD-10-CM

## 2024-08-09 DIAGNOSIS — D05.11 DUCTAL CARCINOMA IN SITU (DCIS) OF RIGHT BREAST: Primary | ICD-10-CM

## 2024-08-09 PROCEDURE — 3074F SYST BP LT 130 MM HG: CPT | Performed by: SURGERY

## 2024-08-09 PROCEDURE — 1036F TOBACCO NON-USER: CPT | Performed by: SURGERY

## 2024-08-09 PROCEDURE — 3008F BODY MASS INDEX DOCD: CPT | Performed by: SURGERY

## 2024-08-09 PROCEDURE — 99024 POSTOP FOLLOW-UP VISIT: CPT | Performed by: SURGERY

## 2024-08-09 PROCEDURE — 3079F DIAST BP 80-89 MM HG: CPT | Performed by: SURGERY

## 2024-08-09 ASSESSMENT — ENCOUNTER SYMPTOMS
EYE REDNESS: 0
FEVER: 0
SHORTNESS OF BREATH: 0
SPEECH DIFFICULTY: 0
FREQUENCY: 0
FLANK PAIN: 0
NAUSEA: 0
MYALGIAS: 0
WEAKNESS: 0
VOMITING: 0
FATIGUE: 0
POLYPHAGIA: 0
CONFUSION: 0
CONSTIPATION: 0
ARTHRALGIAS: 0
EYE PAIN: 0
ABDOMINAL PAIN: 0
DYSURIA: 0
DIARRHEA: 0
AGITATION: 0
CHILLS: 0
HEADACHES: 0
WOUND: 0
COUGH: 0
BRUISES/BLEEDS EASILY: 0
HEMATURIA: 0

## 2024-08-09 NOTE — PATIENT INSTRUCTIONS
1.  Continue following up with plastic surgery (Dr. Travis) for follow-up of your reconstructive surgery.  2.  Monthly, continue doing your self breast exams.  If you feel any abnormalities, please call Dr. Campos's office immediately.  [phone]  3.  Follow-up in Dr. Campos's office in 6 months for routine follow-up of your right breast cancer.

## 2024-08-09 NOTE — PROGRESS NOTES
GENERAL SURGERY OFFICE NOTE    Patient: Elizabeth Goss    Age: 38 y.o.   Gender: female    MRN: [id number]    PCP: Abhilash Porter, DO        SUBJECTIVE     Chief Complaint  Follow-up (Patient is here for a 2 week post op bilateral mastectomy. Patient states that her right arm is achy and she has pain where the lymph node was removed. )       CHRISTY Johnson returns to the office for a 2-week postop check after undergoing bilateral mastectomy with right SLNB and stage I reconstructive surgery for right breast DCIS.  After her surgery, she has not had any significant problems.  She has followed up with plastic surgery several times, and just had her last drain removed yesterday.  She does complain of some soreness on the lateral aspect of the right chest, but notes that this was where the last drain was located.  She is not having any significant drainage.  There was some mottling to some of the skin on the right breast, but this has since resolved.  She is back to eating and drinking well without any nausea or vomiting.  No fevers.    Original risk factors for breast cancer: 38-year-old white female; menarche at age [age]; first live birth at age 32; no previous breast biopsy; no first-degree relative with breast cancer.  She is premenopausal.  Paternal great aunt had breast cancer.  No family history of ovarian cancer, pancreatic cancer or prostate cancer.  This gives her a 5-year Shahrzad score of 0.7% and a lifetime risk of 12.5% which puts her just above average risk category.    ROS  Review of Systems   Constitutional:  Negative for chills, fatigue and fever.   HENT:  Negative for congestion, ear pain and hearing loss.    Eyes:  Negative for pain and redness.   Respiratory:  Negative for cough and shortness of breath.    Cardiovascular:  Negative for chest pain and leg swelling.   Gastrointestinal:  Negative for abdominal pain, constipation, diarrhea, nausea and vomiting.   Endocrine: Negative for  polyphagia.   Genitourinary:  Negative for dysuria, flank pain, frequency and hematuria.   Musculoskeletal:  Negative for arthralgias and myalgias.   Skin:  Negative for rash and wound.   Allergic/Immunologic: Negative for immunocompromised state.   Neurological:  Negative for speech difficulty, weakness and headaches.   Hematological:  Does not bruise/bleed easily.   Psychiatric/Behavioral:  Negative for agitation and confusion.           HISTORY     Past Medical History:   Diagnosis Date    Body mass index (BMI) 36.0-36.9, adult 01/06/2022    Body mass index (BMI) of 36.0 to 36.9 in adult    Breast cancer (Multi)     Encounter for general adult medical examination without abnormal findings 07/21/2022    Preventative health care    Encounter for general adult medical examination without abnormal findings 11/24/2021    Preventative health care    Encounter for immunization 01/12/2021    Encounter for immunization    Lesion of sciatic nerve, left lower limb 08/18/2021    Piriformis syndrome of left side    Lesion of sciatic nerve, left lower limb     Piriformis syndrome of left side    Menstrual migraine, not intractable, without status migrainosus 08/07/2020    Menstrual migraine without status migrainosus, not intractable    Morbid (severe) obesity due to excess calories (Multi) 01/10/2022    Class 2 severe obesity due to excess calories with serious comorbidity and body mass index (BMI) of 36.0 to 36.9 in adult    Other hypersomnia 08/07/2020    Daytime hypersomnolence    Other psychoactive substance use, 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 (Multi) 08/07/2020    Drug-induced insomnia    Personal history of other diseases of the circulatory system 07/21/2022    History of essential hypertension    Personal history of other endocrine, nutritional and metabolic disease 08/07/2020    History of hypothyroidism    PONV (postoperative nausea and vomiting) 2005        Past Surgical History:  "  Procedure Laterality Date    BREAST BIOPSY      MASTECTOMY Bilateral 07/25/2024    Bilateral mastectomy with right sentinel lymph node biopsy; stage I reconstructive surgery    MASTECTOMY COMPLETE / SIMPLE Bilateral 07/25/2024    Procedure: Bilateral mastectomy with right sentinel lymph node biopsy;  Surgeon: Barbara Campos MD;  Location: [facility];  Service: General Surgery;  Laterality: Bilateral;  (PREOP 7:30, NUCLEAR 8:30, SURGERY 9:30)  9:30 AM; 3 hours + Dr. Travis hours  (Dr. Travis to follow with stage I reconstructive surgery)    OTHER SURGICAL HISTORY  10/23/2019    Breast reduction    OTHER SURGICAL HISTORY  08/07/2020    Tonsillectomy        Family History   Problem Relation Name Age of Onset    Colon cancer Father Mc         No Known Allergies     Social History     Tobacco Use   Smoking Status Never   Smokeless Tobacco Never        Social History     Substance and Sexual Activity   Alcohol Use Not Currently    Comment: rarely        HOME MEDICATIONS  Current Outpatient Medications   Medication Instructions    buPROPion XL (WELLBUTRIN XL) 150 mg, oral, Every morning, Do not crush, chew, or split.    cloNIDine (CATAPRES) 0.1 mg, oral, 2 times daily    drospirenone, contraceptive, (Slynd) 4 mg (28) tablet 1 tablet, oral, Daily    fluticasone (Flonase) 50 mcg/actuation nasal spray 1 spray, nasal, Daily    levothyroxine (SYNTHROID, LEVOXYL) 75 mcg, oral, Daily    PARoxetine (PAXIL) 10 mg, oral, Nightly, Take one tablet by mouth at the bedtime          OBJECTIVE   Last Recorded Vitals.  Blood pressure 121/87, pulse 88, height 1.626 m (5' 4\"), weight 101 kg (223 lb), SpO2 97%.     PHYSICAL EXAM  Physical Exam   Previous exam:  General: Well-developed, well-nourished and in no acute distress.  Head: Normocephalic. Atraumatic.  Neck/thyroid: Neck is supple.   Eyes: Pupils equal round and reactive to light. Conjunctiva normal.  ENMT: No masses or deformity of external nose. External ears without " masses.  Respiratory/Chest:  Normal respiratory effort.  Breast: s/p bilateral mastectomy with tissue expanders in place.  Incisions are healing well.  There is no erythema or tissue necrosis.  Old drain sites are dry without erythema or drainage.  No palpable lumps or masses.  Lymphatics: No palpable lymphadenopathy of the cervical, supraclavicular or axillary regions.  Cardiovascular: Regular rate and rhythm.   Abdomen: Soft, nontender, nondistended.   Musculoskeletal: Joints and limbs are grossly normal. Normal gait. Normal range of motion of major joints.  Neuro: Oriented to person, place and time. No obvious neurological deficit. Motor strength grossly normal.  Psych: Normal mood and affect.    RESULTS   Labs  No results found for this or any previous visit (from the past 24 hour(s)).    Radiology Resutls  MR BREAST BILATERAL WITH CONTRAST FULL PROTOCOL;  5/21/2024 8:12 am      ACCESSION NUMBER(S):  [id number]      ORDERING CLINICIAN:  KEVEN MITCHELL      INDICATION:  Recent diagnosis of right breast cancer, to evaluate extent of  disease. The status post stereotactic guided biopsy of calcifications  in retroareolar region of the right breast on 04/17/2024 with  pathology showing intermediate to high-grade DCIS. History of  bilateral breast reduction.      COMPARISON:  No prior breast MRI is available. Stereotactic 04/17/2024, diagnostic  mammogram and ultrasound 04/11/2024, screening mammogram 03/19/2024.      TECHNIQUE:  Using a dedicated breast coil, STIR axial and T1-weighted fat  saturation axial images of the breasts were obtained, the latter both  before and after intravenous administration of Gadolinium DTPA. On an  independent workstation, 3-D images were formulated using Active-SemiD  including time enhancement curves, subtraction images and MIP images.      Intravenous contrast: 19.5 mL of Dotarem      FINDINGS:  There is symmetric minimal bilateral background enhancement.      Density:  Scattered  fibroglandular tissue.      RIGHT BREAST:  A biopsy marker is seen in the central inferior aspect  of the retroareolar region corresponding to biopsy-proven ductal  carcinoma in-situ. Segmental clumped non mass enhancement is seen  associated with a biopsy clip in central and inferior medial breast  extending from the middle depth to the base of the base of the nipple  spanning approximately 6.0 x 3.3 x 2.8 cm (TR x AP x CC). The extent  of the abnormality is best appreciated on the MIP reconstructed  images (series 510, image 14). The extent of enhancement particularly  in the inferior medial breast is greater than the extent of  calcifications on mammogram. Subtle mammographic focal asymmetry is  questioned on mammogram at this site. The abnormal enhancement  extends to the base of the nipple without suspicious nipple  enhancement. There is no skin or chest wall involvement.      No axillary or internal mammary lymphadenopathy is appreciated.      LEFT BREAST: Benign postsurgical changes of reduction. No suspicious  mass or nonmass enhancement is identified.      No axillary or internal mammary lymphadenopathy is appreciated.      NON-BREAST FINDINGS:  None.      IMPRESSION:  1. Biopsy-proven malignancy in subareolar right breast with  contiguous non-mass enhancement extending in inferior medial breast  spanning up to 6 cm and extending to the base of the nipple. The  extent of abnormality is greater on MRI compared to calcifications on  mammogram. If breast conservation is pursued, MRI-guided biopsy of  the posterior extent of enhancement is recommended.      2. No evidence of lymphadenopathy.      3. No MRI evidence of malignancy in the left breast.      BI-RADS CATEGORY:  BI-RADS CATEGORY:  6 Known Biopsy-Proven Malignancy.  Recommendation:  Immediate Follow-up.  Recommended Date:  Immediate.  Laterality:  Right.    Pathology  FINAL DIAGNOSIS   A. Right breast calcifications, stereotactic-guided core  biopsy:  -- Ductal carcinoma in situ, cribriform and papillary patterns, intermediate to high nuclear grade with focal associated microcalcifications, see note.      Note: Immunohistochemical stains for CK5/6 and estrogen receptor are consistent with an atypical proliferation. Multiple deeper levels were reviewed. ER will be reported in an addendum.          peb   Electronically signed by Zacarias Kumar DO on 4/24/2024 at 1123      [facility]   By the signature on this report, the individual or group listed as making the Final Interpretation/Diagnosis certifies that they have reviewed this case.    Comment  [facility]   Case was shown at Breast Case Review Conference via Zoom meeting with concordance    Addendum   Surgical/Block Number:        [id number] A1 and A4  Specimen Site:         Right breast calcifications  Specimen Type:       Core biopsy     Estrogen Receptor (clone SP1):                     Positive                                                                           Percentage with nuclear staining: >95%                                                                             Surgical Pathology  FINAL DIAGNOSIS   A.  Lymph node, right sentinel #1, biopsy:  -- One lymph node negative for carcinoma (0/1)     B.  Breast, right, mastectomy:  --Ductal carcinoma in situ (DCIS), nuclear grade 2-3, cribriform, flat, micropapillary and solid types, with focal calcifications; please see synoptic summary  --DCIS involves intraductal papilloma  --Skin and nipple with no significant histopathologic abnormalities     Note: Immunohistochemical studies have been performed.  P63 and SMMHC are positive in myoepithelial cells.  Pancytokeratin A1/3 is positive in epithelial cells.     This case (B4) has been reviewed at the [facility] breast pathology consensus conference via Zoom on 8/6/24.  Attending: RAJEEV Kumar DO, JOHN Rojas MD, HARMAN Evans MD, ROYCE Abad MD     C.  Lymph node, right sentinel #2, biopsy:  -- One  lymph node negative for carcinoma (0/1)     D.  Lymph node, right sentinel #3, biopsy:  -- One lymph node negative for carcinoma (0/1)     E.  Breast, left, mastectomy:  --Breast parenchyma with small fibroadenoma, fibrocystic changes and usual ductal hyperplasia  --Skin and nipple with mild chronic inflammation   Electronically signed by Bang Ortiz MD on 8/7/2024 at 1518      [facility]       ASSESSMENT / PLAN   Diagnoses and all orders for this visit:  Ductal carcinoma in situ (DCIS) of right breast  BRCA negative      Plan  April 2024: RIGHT; retroareolar calc; DCIS; ER+; MRI suggests a 6cm area of involvement including the base of the nipple; s/p bilateral mastectomy with right SLNB and stage I reconstructive surgery; pathology did not identify any invasive carcinoma    1.  Reviewed with the patient that the core needle biopsy of the calcifications is consistent with ductal carcinoma in situ.  Her calcifications are less than 1.5 cm behind the nipple/areolar region.  Unclear as to the extent of DCIS and the involvement of the nipple.  MRI was performed to help determine the extent of disease and possible nipple involvement.  MRI suggested an area of 6 cm with involvement of the base of the nipple.  Final pathology identified DCIS without any evidence of invasive carcinoma.  Lymph nodes negative.  Since her final pathology only showed DCIS and she is now status post bilateral mastectomy, no endocrine therapy is needed.  2.  With her younger age, and recent diagnosis of DCIS, she was referred to genetics.  Genetic testing negative for BRCA gene.  3.  She will continue to follow-up with plastic surgery for her reconstruction.  4.  Since she is status post bilateral mastectomy, no mammograms are needed.  5.  She will follow-up in the office every 6 months for the first 2 years, then yearly.  Follow-up in the office in 6 months for routine follow-up of the right breast DCIS.      Barbara Campos MD, FACS    [facility]  [address] [place] [address];   [address]  [place]  [address] [phone]

## 2024-08-13 ENCOUNTER — TELEPHONE (OUTPATIENT)
Dept: PRIMARY CARE | Facility: CLINIC | Age: 39
End: 2024-08-13
Payer: COMMERCIAL

## 2024-08-13 NOTE — TELEPHONE ENCOUNTER
Patient says that when she came in for her 7/22/24 visit that it was a supposed to be a wellness visit and she should not have had a co-pay but she has just received a bill for her co-pay.

## 2024-08-28 ENCOUNTER — TELEPHONE (OUTPATIENT)
Dept: OBSTETRICS AND GYNECOLOGY | Facility: CLINIC | Age: 39
End: 2024-08-28
Payer: COMMERCIAL

## 2024-08-30 DIAGNOSIS — Z30.09 GENERAL COUNSELING AND ADVICE FOR CONTRACEPTIVE MANAGEMENT: Primary | ICD-10-CM

## 2024-08-30 RX ORDER — NORETHINDRONE 0.35 MG/1
1 TABLET ORAL DAILY
Qty: 28 TABLET | Refills: 11 | Status: SHIPPED | OUTPATIENT
Start: 2024-08-30 | End: 2025-08-30

## 2024-08-30 NOTE — TELEPHONE ENCOUNTER
Contacted pt and verified name and .  Pt notified that norethindrone was sent to her pharmacy.  Patient states understanding and has no questions at this time.

## 2024-09-09 ENCOUNTER — APPOINTMENT (OUTPATIENT)
Dept: OBSTETRICS AND GYNECOLOGY | Facility: CLINIC | Age: 39
End: 2024-09-09
Payer: COMMERCIAL

## 2024-09-09 VITALS
WEIGHT: 231.8 LBS | BODY MASS INDEX: 39.57 KG/M2 | SYSTOLIC BLOOD PRESSURE: 124 MMHG | DIASTOLIC BLOOD PRESSURE: 70 MMHG | HEIGHT: 64 IN

## 2024-09-09 DIAGNOSIS — N93.9 ABNORMAL UTERINE BLEEDING (AUB): ICD-10-CM

## 2024-09-09 DIAGNOSIS — D05.11 DUCTAL CARCINOMA IN SITU (DCIS) OF RIGHT BREAST: ICD-10-CM

## 2024-09-09 DIAGNOSIS — N93.9 ABNORMAL UTERINE BLEEDING (AUB): Primary | ICD-10-CM

## 2024-09-09 DIAGNOSIS — N95.1 PERIMENOPAUSAL: ICD-10-CM

## 2024-09-09 PROCEDURE — 3078F DIAST BP <80 MM HG: CPT | Performed by: NURSE PRACTITIONER

## 2024-09-09 PROCEDURE — 1036F TOBACCO NON-USER: CPT | Performed by: NURSE PRACTITIONER

## 2024-09-09 PROCEDURE — 3008F BODY MASS INDEX DOCD: CPT | Performed by: NURSE PRACTITIONER

## 2024-09-09 PROCEDURE — 3074F SYST BP LT 130 MM HG: CPT | Performed by: NURSE PRACTITIONER

## 2024-09-09 PROCEDURE — 99213 OFFICE O/P EST LOW 20 MIN: CPT | Performed by: NURSE PRACTITIONER

## 2024-09-09 RX ORDER — DROSPIRENONE 4 MG/1
4 TABLET, FILM COATED ORAL DAILY
Qty: 90 TABLET | Refills: 3 | Status: SHIPPED | OUTPATIENT
Start: 2024-09-09

## 2024-09-09 ASSESSMENT — ENCOUNTER SYMPTOMS
ALLERGIC/IMMUNOLOGIC NEGATIVE: 0
ENDOCRINE NEGATIVE: 0
CONSTITUTIONAL NEGATIVE: 0
HEMATOLOGIC/LYMPHATIC NEGATIVE: 0
CARDIOVASCULAR NEGATIVE: 0
MUSCULOSKELETAL NEGATIVE: 0
EYES NEGATIVE: 0
RESPIRATORY NEGATIVE: 0
NEUROLOGICAL NEGATIVE: 0
GASTROINTESTINAL NEGATIVE: 0
PSYCHIATRIC NEGATIVE: 0

## 2024-09-09 ASSESSMENT — PAIN SCALES - GENERAL: PAINLEVEL: 0-NO PAIN

## 2024-09-09 NOTE — PROGRESS NOTES
Subjective   Patient ID: Elizabeth Goss is a 38 y.o. female who presents for Follow-up (Hormone meds ).  HPI  H/o breast cancer, double mastectomy 7/2024, ER +, chemotherapy and radiation not recommended  She will not be on tamoxifen  I last saw her 5/2024  Perimenopausal  : vasectomy; she previously had an IUD but did not like it  Vaginal bleeding:  monthly with 5-6 days of heavy VB  VMS: yes  Sleep difficulties: yes  Mood changes: yes  Joint pain: yes  Brain fog/difficulty concentrating: yes     I  prescribed her 10mg paxil and slynd    Today she states:  Paxil 10mg taking it at HS d/t AE drowsiness  Slynd stopped d/t  insurance coverage  Changed to Norethindrone 0.35mg per insurance    VMS, sleep: improved  Joint pain not improved  Occasional brain fog    GSM: no issue         Review of Systems    Objective   Physical Exam    Assessment/Plan   Diagnoses and all orders for this visit:  Abnormal uterine bleeding (AUB)  Perimenopausal  Ductal carcinoma in situ (DCIS) of right breast    Discussed that .35mg norethindrone will not reduce the heavy VB with her menses  Slynd samples x3 given; lot # [id number], expiration 01/2027, will help with a prior authorization       KATY Aldridge-CNP 09/09/24 1:32 PM

## 2024-09-10 ENCOUNTER — APPOINTMENT (OUTPATIENT)
Dept: ORTHOPEDIC SURGERY | Facility: CLINIC | Age: 39
End: 2024-09-10
Payer: COMMERCIAL

## 2024-09-12 RX ORDER — NORETHINDRONE 5 MG/1
TABLET ORAL
Refills: 3 | OUTPATIENT
Start: 2024-09-12

## 2024-09-12 NOTE — TELEPHONE ENCOUNTER
Pt verified by name and .  Pt is aware nurse spoke to rep for slynd who recommends rx go to [facility].  Cash price 25.00 for 1 month or 50.00 for 3 month supply.  Pt is fine with that.  Pt is aware nurse will send message to Ward Pisano to have her send rx to [facility].  Pt is aware pharmacy will be reaching out to her.  Pt has no questions at this time.            Ward Pisano, APRN-CNP  [place], [place]  Caller: Unspecified (3 days ago,  3:32 PM)  Are you able to contact the drug rep for help with coverage for Slynd? She has done really well with the samples; let me know if that is not an option   - cont prednisone 75 mg daily   - transfuse for Hgb<7

## 2024-09-17 DIAGNOSIS — N93.9 ABNORMAL UTERINE BLEEDING (AUB): ICD-10-CM

## 2024-09-17 RX ORDER — DROSPIRENONE 4 MG/1
4 TABLET, FILM COATED ORAL DAILY
Qty: 90 TABLET | Refills: 3 | Status: SHIPPED | OUTPATIENT
Start: 2024-09-17

## 2024-09-18 ENCOUNTER — APPOINTMENT (OUTPATIENT)
Dept: ORTHOPEDIC SURGERY | Facility: CLINIC | Age: 39
End: 2024-09-18
Payer: COMMERCIAL

## 2024-09-18 DIAGNOSIS — M54.16 LUMBAR RADICULOPATHY: Primary | ICD-10-CM

## 2024-09-18 PROCEDURE — 1036F TOBACCO NON-USER: CPT | Performed by: ORTHOPAEDIC SURGERY

## 2024-09-18 PROCEDURE — 99203 OFFICE O/P NEW LOW 30 MIN: CPT | Performed by: ORTHOPAEDIC SURGERY

## 2024-09-18 ASSESSMENT — PAIN - FUNCTIONAL ASSESSMENT: PAIN_FUNCTIONAL_ASSESSMENT: 0-10

## 2024-09-18 ASSESSMENT — PAIN SCALES - GENERAL: PAINLEVEL_OUTOF10: 4

## 2024-09-18 NOTE — LETTER
September 18, 2024     Abhilash Porter DO  [address] [facility] [address]  [facility] [id number]    Patient: Elizabeth Goss   YOB: 1985   Date of Visit: 9/18/2024       Dear Dr. Abhilash Porter DO:    Thank you for referring Elizabeth Goss to me for evaluation. Below are my notes for this consultation.  If you have questions, please do not hesitate to call me. I look forward to following your patient along with you.       Sincerely,     Tray García MD      CC: No Recipients  ______________________________________________________________________________________    Very pleasant 38-year-old woman who presents with chronic tailbone pain.    She is referred by Dr. Mcgrath, a plastic surgeon who had treated her for a soft tissue condition in the coccygeal area.    She does have intermittent low back pain.  No constitutional symptoms.    She has recently been treated for breast cancer.    Family, social, and medical histories are obtained and reviewed.    30-point, patient-recorded Review of Systems is personally obtained and reviewed. Inclusive is no history of weight loss, change in appetite, recent change in activity level, change in bowel or bladder habits, fevers, chills, malaise, or night pain.    Healthy-appearing appearing person no acute distress. Stable gait. Posture is upright. Painless flexion and extension of the lumbar spine. Painless motion in both hips. Strength intact in the lower extremities without pathologic reflexes. Palpable dorsalis pedis pulses.    MRI of the sacrum and coccyx shows moderate degenerative change.  We see the lower portion of the lumbar spine.  She has mild degenerative change but no acute abnormality.    She has chronic tailbone type pain as well as back pain.  I do not think she will need to consider elective spine surgery.  To further evaluate her an updated MRI of the lumbosacral spine would be indicated based on the  severity of her symptoms.    I will speak with her in follow-up.    ** Dictated with voice recognition software and not immediately reviewed for errors in grammar and/or spelling **

## 2024-09-18 NOTE — PROGRESS NOTES
Very pleasant 38-year-old woman who presents with chronic tailbone pain.    She is referred by Dr. Mcgrath, a plastic surgeon who had treated her for a soft tissue condition in the coccygeal area.    She does have intermittent low back pain.  No constitutional symptoms.    She has recently been treated for breast cancer.    Family, social, and medical histories are obtained and reviewed.    30-point, patient-recorded Review of Systems is personally obtained and reviewed. Inclusive is no history of weight loss, change in appetite, recent change in activity level, change in bowel or bladder habits, fevers, chills, malaise, or night pain.    Healthy-appearing appearing person no acute distress. Stable gait. Posture is upright. Painless flexion and extension of the lumbar spine. Painless motion in both hips. Strength intact in the lower extremities without pathologic reflexes. Palpable dorsalis pedis pulses.    MRI of the sacrum and coccyx shows moderate degenerative change.  We see the lower portion of the lumbar spine.  She has mild degenerative change but no acute abnormality.    She has chronic tailbone type pain as well as back pain.  I do not think she will need to consider elective spine surgery.  To further evaluate her an updated MRI of the lumbosacral spine would be indicated based on the severity of her symptoms.    I will speak with her in follow-up.    ** Dictated with voice recognition software and not immediately reviewed for errors in grammar and/or spelling **

## 2024-10-03 ENCOUNTER — APPOINTMENT (OUTPATIENT)
Dept: RADIOLOGY | Facility: CLINIC | Age: 39
End: 2024-10-03
Payer: COMMERCIAL

## 2024-10-16 ENCOUNTER — PATIENT MESSAGE (OUTPATIENT)
Dept: PRIMARY CARE | Facility: CLINIC | Age: 39
End: 2024-10-16
Payer: COMMERCIAL

## 2024-10-16 DIAGNOSIS — Z12.11 COLON CANCER SCREENING: Primary | ICD-10-CM

## 2024-10-23 ENCOUNTER — LAB (OUTPATIENT)
Dept: LAB | Facility: LAB | Age: 39
End: 2024-10-23
Payer: COMMERCIAL

## 2024-10-23 ENCOUNTER — APPOINTMENT (OUTPATIENT)
Dept: SURGERY | Facility: CLINIC | Age: 39
End: 2024-10-23
Payer: COMMERCIAL

## 2024-10-23 VITALS
BODY MASS INDEX: 39.85 KG/M2 | OXYGEN SATURATION: 99 % | HEIGHT: 64 IN | HEART RATE: 87 BPM | WEIGHT: 233.4 LBS | SYSTOLIC BLOOD PRESSURE: 120 MMHG | DIASTOLIC BLOOD PRESSURE: 88 MMHG

## 2024-10-23 DIAGNOSIS — M46.1 BILATERAL SACROILIITIS (CMS-HCC): ICD-10-CM

## 2024-10-23 DIAGNOSIS — Z12.11 COLON CANCER SCREENING: Primary | ICD-10-CM

## 2024-10-23 LAB
CCP IGG SERPL-ACNC: <1 U/ML
RHEUMATOID FACT SER NEPH-ACNC: <10 IU/ML (ref 0–15)

## 2024-10-23 PROCEDURE — 3079F DIAST BP 80-89 MM HG: CPT | Performed by: SURGERY

## 2024-10-23 PROCEDURE — 86038 ANTINUCLEAR ANTIBODIES: CPT

## 2024-10-23 PROCEDURE — 86200 CCP ANTIBODY: CPT

## 2024-10-23 PROCEDURE — 86431 RHEUMATOID FACTOR QUANT: CPT

## 2024-10-23 PROCEDURE — 3008F BODY MASS INDEX DOCD: CPT | Performed by: SURGERY

## 2024-10-23 PROCEDURE — 86235 NUCLEAR ANTIGEN ANTIBODY: CPT

## 2024-10-23 PROCEDURE — 3074F SYST BP LT 130 MM HG: CPT | Performed by: SURGERY

## 2024-10-23 PROCEDURE — 99214 OFFICE O/P EST MOD 30 MIN: CPT | Performed by: SURGERY

## 2024-10-23 PROCEDURE — 1036F TOBACCO NON-USER: CPT | Performed by: SURGERY

## 2024-10-23 PROCEDURE — 86225 DNA ANTIBODY NATIVE: CPT

## 2024-10-23 PROCEDURE — 36415 COLL VENOUS BLD VENIPUNCTURE: CPT

## 2024-10-23 RX ORDER — POLYETHYLENE GLYCOL 3350, SODIUM SULFATE ANHYDROUS, SODIUM BICARBONATE, SODIUM CHLORIDE, POTASSIUM CHLORIDE 236; 22.74; 6.74; 5.86; 2.97 G/4L; G/4L; G/4L; G/4L; G/4L
4000 POWDER, FOR SOLUTION ORAL ONCE
Qty: 4000 ML | Refills: 0 | Status: SHIPPED | OUTPATIENT
Start: 2024-10-23 | End: 2024-10-23

## 2024-10-23 ASSESSMENT — ENCOUNTER SYMPTOMS
VOMITING: 0
CHILLS: 0
ABDOMINAL PAIN: 0
BLOOD IN STOOL: 0
SHORTNESS OF BREATH: 0
DIZZINESS: 0
CONSTIPATION: 0
DIARRHEA: 0
COUGH: 0
HEADACHES: 0
PALPITATIONS: 0
NAUSEA: 0
FEVER: 0

## 2024-10-23 NOTE — PROGRESS NOTES
GENERAL SURGERY CLINIC NOTE    Elizabeth Goss   1985   [id number]     History Of Present Illness  Elizabeth Goss is a 38 y.o. female who presents to the office to schedule her first colonoscopy. She does not experience significant GI symptoms. 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 Campos. Dr. Travis performed a reconstruction procedure and she has another one scheduled with him for 11/12/24. She has been evaluated by [facility] and her workup was negative.    She works from home and is able to take breaks when needed.     Past Medical History  She has a past medical history of Allergic, Body mass index (BMI) 36.0-36.9, adult (01/06/2022), Breast cancer (Multi), Disease of thyroid gland (2014), Encounter for general adult medical examination without abnormal findings (07/21/2022), Encounter for general adult medical examination without abnormal findings (11/24/2021), Encounter for immunization (01/12/2021), Hypertension (Jan 2021), Lesion of sciatic nerve, left lower limb (08/18/2021), Lesion of sciatic nerve, left lower limb, Menstrual migraine, not intractable, without status migrainosus (08/07/2020), Morbid (severe) obesity due to excess calories (Multi) (01/10/2022), Other hypersomnia (08/07/2020), Other psychoactive substance use, 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    Surgical History  She has a past surgical history that includes Other surgical history (10/23/2019); Other surgical history (08/07/2020); Mastectomy (Bilateral, 07/25/2024); Mastectomy complete / simple (Bilateral, 07/25/2024); Breast biopsy; [place] tooth extraction; and Tonsillectomy.  No abdominal  "surgeries    Medications  Current Outpatient Medications on File Prior to Visit   Medication Sig Dispense Refill    buPROPion XL (Wellbutrin XL) 150 mg 24 hr tablet Take 1 tablet (150 mg) by mouth once daily in the morning. Do not crush, chew, or split. 90 tablet 3    cloNIDine (Catapres) 0.1 mg tablet Take 1 tablet (0.1 mg) by mouth 2 times a day. 180 tablet 3    drospirenone, contraceptive, (Slynd) 4 mg (28) tablet Take 1 tablet by mouth once daily. 90 tablet 3    fluticasone (Flonase) 50 mcg/actuation nasal spray Administer 1 spray into affected nostril(s) once daily.      levothyroxine (Synthroid, Levoxyl) 75 mcg tablet Take 1 tablet (75 mcg) by mouth once daily. 90 tablet 3    PARoxetine (PaxiL) 10 mg tablet Take 1 tablet (10 mg) by mouth once daily at bedtime. Take one tablet by mouth at the bedtime 90 tablet 3     No current facility-administered medications on file prior to visit.       Allergies  Patient has no known allergies.     Social History  She reports that she has never smoked. She has never used smokeless tobacco. She reports that she does not currently use alcohol. She reports that she does not use drugs.    Family History  Family History   Problem Relation Name Age of Onset    Colon cancer Father Mc     Hypertension Father Mc     Colon polyps Father Mc    Father had colon cancer at 55     Review of Systems   Constitutional:  Negative for chills and fever.   Respiratory:  Negative for cough and shortness of breath.    Cardiovascular:  Negative for chest pain and palpitations.   Gastrointestinal:  Negative for abdominal pain, blood in stool, constipation, diarrhea, nausea and vomiting.   Neurological:  Negative for dizziness and headaches.   All other systems reviewed and are negative.      Last Recorded Vitals  Blood pressure 120/88, pulse 87, height 1.626 m (5' 4\"), weight 106 kg (233 lb 6.4 oz), SpO2 99%.     Physical Exam  Constitutional:       General: She is not in acute distress.     " Appearance: Normal appearance. She is not ill-appearing.   HENT:      Head: Normocephalic and atraumatic.   Cardiovascular:      Rate and Rhythm: Normal rate and regular rhythm.   Pulmonary:      Effort: Pulmonary effort is normal. No respiratory distress.      Breath sounds: Normal breath sounds.   Abdominal:      General: There is no distension.      Palpations: Abdomen is soft.      Tenderness: There is no abdominal tenderness. There is no guarding.   Musculoskeletal:         General: No swelling.   Skin:     General: Skin is warm and dry.   Neurological:      Mental Status: She is alert and oriented to person, place, and time. Mental status is at baseline.   Psychiatric:         Mood and Affect: Mood normal.         Behavior: Behavior normal.         Assessment and Plan  38 y.o. female with a family history of colon cancer who presents to schedule her first colonoscopy. The risks and benefits of undergoing colonoscopy were discussed. Risks include allergic reactions, heart or lung complications, bleeding, infection, injury to surrounding tissue, and perforation of the colon. The patient expressed understanding and provided informed consent for the procedure.     Colonoscopy in Endoscopy 12/4/24. The [facility] prep was sent to her pharmacy.     Beatrice Gutierrez MD, FACS  General Surgery

## 2024-10-25 LAB
ANA PATTERN 2: ABNORMAL
ANA PATTERN: ABNORMAL
ANA SER QL HEP2 SUBST: POSITIVE
ANA TITER 2: ABNORMAL
ANA TITR SER IF: ABNORMAL {TITER}
CENTROMERE B AB SER-ACNC: <0.2 AI
CHROMATIN AB SERPL-ACNC: <0.2 AI
DSDNA AB SER-ACNC: <1 IU/ML
ENA JO1 AB SER QL IA: <0.2 AI
ENA RNP AB SER IA-ACNC: 0.8 AI
ENA SCL70 AB SER QL IA: <0.2 AI
ENA SM AB SER IA-ACNC: <0.2 AI
ENA SM+RNP AB SER QL IA: <0.2 AI
ENA SS-A AB SER IA-ACNC: <0.2 AI
ENA SS-B AB SER IA-ACNC: <0.2 AI
RIBOSOMAL P AB SER-ACNC: <0.2 AI

## 2024-10-28 ENCOUNTER — APPOINTMENT (OUTPATIENT)
Dept: PRIMARY CARE | Facility: CLINIC | Age: 39
End: 2024-10-28
Payer: COMMERCIAL

## 2024-10-28 VITALS
BODY MASS INDEX: 39.61 KG/M2 | HEART RATE: 89 BPM | SYSTOLIC BLOOD PRESSURE: 120 MMHG | HEIGHT: 64 IN | DIASTOLIC BLOOD PRESSURE: 82 MMHG | WEIGHT: 232 LBS

## 2024-10-28 DIAGNOSIS — R73.02 IGT (IMPAIRED GLUCOSE TOLERANCE): ICD-10-CM

## 2024-10-28 DIAGNOSIS — E66.09 CLASS 2 OBESITY DUE TO EXCESS CALORIES WITHOUT SERIOUS COMORBIDITY WITH BODY MASS INDEX (BMI) OF 39.0 TO 39.9 IN ADULT: ICD-10-CM

## 2024-10-28 DIAGNOSIS — E66.812 CLASS 2 OBESITY DUE TO EXCESS CALORIES WITHOUT SERIOUS COMORBIDITY WITH BODY MASS INDEX (BMI) OF 39.0 TO 39.9 IN ADULT: ICD-10-CM

## 2024-10-28 DIAGNOSIS — M77.01 MEDIAL EPICONDYLITIS OF BOTH ELBOWS: ICD-10-CM

## 2024-10-28 DIAGNOSIS — M25.50 POLYARTHRALGIA: ICD-10-CM

## 2024-10-28 DIAGNOSIS — I10 ESSENTIAL HYPERTENSION: ICD-10-CM

## 2024-10-28 DIAGNOSIS — E03.9 PRIMARY HYPOTHYROIDISM: ICD-10-CM

## 2024-10-28 DIAGNOSIS — F43.23 SITUATIONAL MIXED ANXIETY AND DEPRESSIVE DISORDER: ICD-10-CM

## 2024-10-28 DIAGNOSIS — D05.11 DUCTAL CARCINOMA IN SITU (DCIS) OF RIGHT BREAST: Primary | ICD-10-CM

## 2024-10-28 DIAGNOSIS — E78.5 DYSLIPIDEMIA: ICD-10-CM

## 2024-10-28 DIAGNOSIS — M77.02 MEDIAL EPICONDYLITIS OF BOTH ELBOWS: ICD-10-CM

## 2024-10-28 DIAGNOSIS — M46.1 BILATERAL SACROILIITIS (CMS-HCC): ICD-10-CM

## 2024-10-28 PROBLEM — Z12.31 SCREENING MAMMOGRAM FOR BREAST CANCER: Status: RESOLVED | Noted: 2024-01-22 | Resolved: 2024-10-28

## 2024-10-28 PROCEDURE — 3079F DIAST BP 80-89 MM HG: CPT | Performed by: INTERNAL MEDICINE

## 2024-10-28 PROCEDURE — 3074F SYST BP LT 130 MM HG: CPT | Performed by: INTERNAL MEDICINE

## 2024-10-28 PROCEDURE — 3008F BODY MASS INDEX DOCD: CPT | Performed by: INTERNAL MEDICINE

## 2024-10-28 PROCEDURE — 99214 OFFICE O/P EST MOD 30 MIN: CPT | Performed by: INTERNAL MEDICINE

## 2024-10-28 ASSESSMENT — ENCOUNTER SYMPTOMS
OCCASIONAL FEELINGS OF UNSTEADINESS: 0
LOSS OF SENSATION IN FEET: 0
DEPRESSION: 0

## 2024-10-29 NOTE — H&P (VIEW-ONLY)
"Subjective   Reason for Visit: Elizabeth Goss is an 38 y.o. female here for a follow-up visit.     Past Medical, Surgical, and Family History reviewed and updated in chart.         HPI    Patient Care Team:  Abhilash Porter DO as PCP - General  Gisel Stevens RN as Nurse Navigator (Radiology)     Review of Systems   All other systems reviewed and are negative.      Objective   Vitals:  /82 (BP Location: Left arm)   Pulse 89   Ht 1.626 m (5' 4\")   Wt 105 kg (232 lb)   BMI 39.82 kg/m²       Physical Exam  Vitals and nursing note reviewed.   Constitutional:       General: She is not in acute distress.     Appearance: Normal appearance. She is well-developed. She is obese. She is not toxic-appearing.   HENT:      Head: Normocephalic and atraumatic.      Right Ear: Tympanic membrane and external ear normal.      Left Ear: Tympanic membrane and external ear normal.      Nose: Nose normal.      Mouth/Throat:      Mouth: Mucous membranes are moist.      Pharynx: Oropharynx is clear. No oropharyngeal exudate or posterior oropharyngeal erythema.      Tonsils: No tonsillar exudate. 2+ on the right. 2+ on the left.   Eyes:      Extraocular Movements: Extraocular movements intact.      Conjunctiva/sclera: Conjunctivae normal.   Cardiovascular:      Rate and Rhythm: Normal rate and regular rhythm.      Pulses: Normal pulses.      Heart sounds: Normal heart sounds. No murmur heard.  Pulmonary:      Effort: Pulmonary effort is normal.      Breath sounds: Normal breath sounds.   Abdominal:      General: Abdomen is flat. Bowel sounds are normal.      Palpations: Abdomen is soft.   Musculoskeletal:      Cervical back: Neck supple.   Lymphadenopathy:      Cervical: No cervical adenopathy.   Skin:     General: Skin is warm and dry.      Findings: No rash.   Neurological:      Mental Status: She is alert. Mental status is at baseline.   Psychiatric:         Mood and Affect: Mood normal.         Behavior: Behavior " normal.         Thought Content: Thought content normal.         Judgment: Judgment normal.         Assessment & Plan  Bilateral sacroiliitis (CMS-HCC)  Reevaluate for inflammatory arthritis and rheumatology follow-up  Orders:    Follow Up In [facility] - PCP - Established    Referral to Rheumatology; Future    Situational mixed anxiety and depressive disorder  Stable continue on bupropion in remission  Orders:    Follow Up In [facility] - PCP - Established    Ductal carcinoma in situ (DCIS) of right breast  Continue with surveillance therapy and follow-up to discuss aromatase inhibitor therapy completed therapy with plastic surgery for breast augmentation       Essential hypertension  Blood pressure stable on clonidine 0.1 mg twice a day also helping with symptoms of menopausal vasomotor symptoms continue with paroxetine 10 mg nightly       Dyslipidemia  Reevaluate cardiometabolic workup with next visit       Primary hypothyroidism  Continue levothyroxine 75 mcg daily reevaluate thyroid function with next visit  Orders:    Tsh With Reflex To Free T4 If Abnormal; Future    Class 2 obesity due to excess calories without serious comorbidity with body mass index (BMI) of 39.0 to 39.9 in adult  Patient previously on GLP-1 agonist therapy for treatment of BMI not covered by insurance at this time looking into safe alternative with compound pharmacy with either semaglutide or tirzepatide monitor closely patient reinitiating exercise program and resistance training       Medial epicondylitis of both elbows  Given information on improving current aggravation and exercises  Orders:    Referral to Rheumatology; Future    Polyarthralgia  Reevaluate with rheumatologist and check inflammatory titers  Orders:    Referral to Rheumatology; Future    IGT (impaired glucose tolerance)  Reevaluate fasting blood sugar hemoglobin A1c with next visit  Orders:    Referral to Rheumatology; Future    Comprehensive  Metabolic Panel; Future    Hemoglobin A1C; Future    Lipid Panel; Future    Albumin-Creatinine Ratio, Urine Random; Future    Follow Up In [facility] - PCP - Established; Future    Tsh With Reflex To Free T4 If Abnormal; Future

## 2024-11-01 ENCOUNTER — TELEPHONE (OUTPATIENT)
Dept: OBSTETRICS AND GYNECOLOGY | Facility: CLINIC | Age: 39
End: 2024-11-01
Payer: COMMERCIAL

## 2024-11-01 DIAGNOSIS — N93.9 ABNORMAL UTERINE BLEEDING (AUB): ICD-10-CM

## 2024-11-01 NOTE — TELEPHONE ENCOUNTER
Contacted pt  Name and  verified  Pt called in to obtain Slynd samples d/t not hearing from [facility] (See  refill encounter)  Rx was sent to wrong pharmacy, pended and sent new rx request to Ward Pisano for prescription to be sent to correct pharmacy.  [facility] - [place] [address] [phone]  Pt advised to contact office if [facility] has not gotten in contact with her by Tuesday.   Pt verbalized understanding.  No further questions or concerns at this time.

## 2024-11-05 RX ORDER — DROSPIRENONE 4 MG/1
4 TABLET, FILM COATED ORAL DAILY
Qty: 90 TABLET | Refills: 3 | Status: SHIPPED | OUTPATIENT
Start: 2024-11-05

## 2024-11-07 ENCOUNTER — ANESTHESIA EVENT (OUTPATIENT)
Dept: OPERATING ROOM | Facility: HOSPITAL | Age: 39
End: 2024-11-07
Payer: COMMERCIAL

## 2024-11-08 ENCOUNTER — TELEPHONE (OUTPATIENT)
Dept: PREADMISSION TESTING | Facility: HOSPITAL | Age: 39
End: 2024-11-08
Payer: COMMERCIAL

## 2024-11-08 RX ORDER — BUTYROSPERMUM PARKII(SHEA BUTTER), SIMMONDSIA CHINENSIS (JOJOBA) SEED OIL, ALOE BARBADENSIS LEAF EXTRACT .01; 1; 3.5 G/100G; G/100G; G/100G
250 LIQUID TOPICAL 2 TIMES DAILY
COMMUNITY

## 2024-11-08 RX ORDER — MULTIVITAMIN/IRON/FOLIC ACID 18MG-0.4MG
1 TABLET ORAL DAILY
COMMUNITY

## 2024-11-08 RX ORDER — VIT C/E/ZN/COPPR/LUTEIN/ZEAXAN 250MG-90MG
25 CAPSULE ORAL DAILY
COMMUNITY

## 2024-11-08 NOTE — TELEPHONE ENCOUNTER
Pre-procedure PAT phone assessment completed. Pre-operative and medication instructions reviewed with patient. Pt states she stopped all vitamins and supplements last week. Patient verbalizes understanding of instructions.  SURGERY PRE-OPERATIVE INSTRUCTIONS    *You will receive a phone call the day before your procedure  after 2pm, (or the Friday before your surgery if scheduled on a Monday.) Generally the hospital will be calling you with this information after that time.    *You are not to eat after midnight the night before the surgery. You may have unlimited clear liquids up until 2 hours prior to arriving to the hospital. The exception is with medications you were instructed to take day of surgery.    *You may take tylenol for pain/discomfort as needed.     *Stop taking all aspirin products, ibuprofen (motrin/advil), naproxen (aleve/naprosyn) for one week prior to surgery.    *Stop taking all vitamins and supplements one week prior to surgery.     *You should not have alcoholic beverages for 24 hours before surgery.     *You should not smoke 24 hours prior to surgery.     *To help prevent surgical infections bathe/shower with Dial soap the evening before surgery.    *You can wear deodorant but no lotion, powder, or perfume/cologne. You should remove all make-up and nail polish at home.    *If you wear glasses, please bring a case for the glasses with you.    *You will be asked to remove dentures and contacts.     *Please leave all valuables at home.    *You should wear loose, comfortable clothing that will accommodate bandages and/or casts.    *You should notify your doctor of any change in your condition (fever, cold, rash, etc). Surgery may need to be re-scheduled until a time you are in better health.    *A responsible adult is required to accompany you to and from the hospital if you are receiving anesthesia or a sedative. Patients are not permitted to drive for 24 hours after anesthesia.     *You can use  the  parking if you wish.     *If you have any further questions please call [facility] [phone].

## 2024-11-12 ENCOUNTER — PHARMACY VISIT (OUTPATIENT)
Dept: PHARMACY | Facility: CLINIC | Age: 39
End: 2024-11-12
Payer: MEDICARE

## 2024-11-12 ENCOUNTER — ANESTHESIA (OUTPATIENT)
Dept: OPERATING ROOM | Facility: HOSPITAL | Age: 39
End: 2024-11-12
Payer: COMMERCIAL

## 2024-11-12 ENCOUNTER — HOSPITAL ENCOUNTER (OUTPATIENT)
Facility: HOSPITAL | Age: 39
Setting detail: OUTPATIENT SURGERY
Discharge: HOME | End: 2024-11-12
Attending: SURGERY | Admitting: SURGERY
Payer: COMMERCIAL

## 2024-11-12 VITALS
HEIGHT: 64 IN | TEMPERATURE: 96.8 F | OXYGEN SATURATION: 94 % | DIASTOLIC BLOOD PRESSURE: 88 MMHG | BODY MASS INDEX: 39.52 KG/M2 | SYSTOLIC BLOOD PRESSURE: 137 MMHG | WEIGHT: 231.48 LBS | RESPIRATION RATE: 18 BRPM | HEART RATE: 92 BPM

## 2024-11-12 DIAGNOSIS — Z98.890 S/P BREAST RECONSTRUCTION, BILATERAL: ICD-10-CM

## 2024-11-12 DIAGNOSIS — Z17.0 ESTROGEN RECEPTOR POSITIVE: ICD-10-CM

## 2024-11-12 DIAGNOSIS — C50.011 PAGET'S DISEASE OF THE BREAST, RIGHT: ICD-10-CM

## 2024-11-12 DIAGNOSIS — D05.11 INTRADUCTAL CARCINOMA IN SITU OF RIGHT BREAST: ICD-10-CM

## 2024-11-12 DIAGNOSIS — G89.18 POSTOPERATIVE PAIN: Primary | ICD-10-CM

## 2024-11-12 LAB — PREGNANCY TEST URINE, POC: NEGATIVE

## 2024-11-12 PROCEDURE — RXMED WILLOW AMBULATORY MEDICATION CHARGE

## 2024-11-12 PROCEDURE — 2720000007 HC OR 272 NO HCPCS: Performed by: SURGERY

## 2024-11-12 PROCEDURE — C1789 PROSTHESIS, BREAST, IMP: HCPCS | Performed by: SURGERY

## 2024-11-12 PROCEDURE — 2500000004 HC RX 250 GENERAL PHARMACY W/ HCPCS (ALT 636 FOR OP/ED): Performed by: ANESTHESIOLOGY

## 2024-11-12 PROCEDURE — 2500000005 HC RX 250 GENERAL PHARMACY W/O HCPCS: Performed by: SURGERY

## 2024-11-12 PROCEDURE — 88304 TISSUE EXAM BY PATHOLOGIST: CPT | Mod: TC,GEALAB | Performed by: SURGERY

## 2024-11-12 PROCEDURE — 3700000002 HC GENERAL ANESTHESIA TIME - EACH INCREMENTAL 1 MINUTE: Performed by: SURGERY

## 2024-11-12 PROCEDURE — 2780000003 HC OR 278 NO HCPCS: Performed by: SURGERY

## 2024-11-12 PROCEDURE — 2500000001 HC RX 250 WO HCPCS SELF ADMINISTERED DRUGS (ALT 637 FOR MEDICARE OP): Performed by: NURSE ANESTHETIST, CERTIFIED REGISTERED

## 2024-11-12 PROCEDURE — 7100000002 HC RECOVERY ROOM TIME - EACH INCREMENTAL 1 MINUTE: Performed by: SURGERY

## 2024-11-12 PROCEDURE — 7100000009 HC PHASE TWO TIME - INITIAL BASE CHARGE: Performed by: SURGERY

## 2024-11-12 PROCEDURE — A19371 PR REMOVAL OF BREAST CAPSULE: Performed by: ANESTHESIOLOGY

## 2024-11-12 PROCEDURE — 7100000010 HC PHASE TWO TIME - EACH INCREMENTAL 1 MINUTE: Performed by: SURGERY

## 2024-11-12 PROCEDURE — 3600000008 HC OR TIME - EACH INCREMENTAL 1 MINUTE - PROCEDURE LEVEL THREE: Performed by: SURGERY

## 2024-11-12 PROCEDURE — 3600000003 HC OR TIME - INITIAL BASE CHARGE - PROCEDURE LEVEL THREE: Performed by: SURGERY

## 2024-11-12 PROCEDURE — 81025 URINE PREGNANCY TEST: CPT | Performed by: ANESTHESIOLOGY

## 2024-11-12 PROCEDURE — 2500000002 HC RX 250 W HCPCS SELF ADMINISTERED DRUGS (ALT 637 FOR MEDICARE OP, ALT 636 FOR OP/ED): Performed by: ANESTHESIOLOGY

## 2024-11-12 PROCEDURE — 3700000001 HC GENERAL ANESTHESIA TIME - INITIAL BASE CHARGE: Performed by: SURGERY

## 2024-11-12 PROCEDURE — A19371 PR REMOVAL OF BREAST CAPSULE: Performed by: NURSE ANESTHETIST, CERTIFIED REGISTERED

## 2024-11-12 PROCEDURE — 7100000001 HC RECOVERY ROOM TIME - INITIAL BASE CHARGE: Performed by: SURGERY

## 2024-11-12 PROCEDURE — 2500000004 HC RX 250 GENERAL PHARMACY W/ HCPCS (ALT 636 FOR OP/ED): Performed by: NURSE ANESTHETIST, CERTIFIED REGISTERED

## 2024-11-12 DEVICE — FILL KIT, UNIVERSAL, IMPLANT, MAMMARY: Type: IMPLANTABLE DEVICE | Site: BREAST | Status: NON-FUNCTIONAL

## 2024-11-12 DEVICE — IMPLANTABLE DEVICE: Type: IMPLANTABLE DEVICE | Site: BREAST | Status: FUNCTIONAL

## 2024-11-12 DEVICE — SINGLE USE STERILE SALINE BREAST IMPLANT SIZER FOR NATRELLE STYLE 68/168 MODERATE PROFILE, 650CC
Type: IMPLANTABLE DEVICE | Site: BREAST | Status: NON-FUNCTIONAL
Brand: SINGLE USE STERILE SALINE BREAST IMPLANT SIZER FOR NATRELLE STYLE 68/168 MP

## 2024-11-12 RX ORDER — ONDANSETRON HYDROCHLORIDE 2 MG/ML
INJECTION, SOLUTION INTRAVENOUS AS NEEDED
Status: DISCONTINUED | OUTPATIENT
Start: 2024-11-12 | End: 2024-11-12

## 2024-11-12 RX ORDER — CEFAZOLIN 1 G/1
INJECTION, POWDER, FOR SOLUTION INTRAVENOUS AS NEEDED
Status: DISCONTINUED | OUTPATIENT
Start: 2024-11-12 | End: 2024-11-12

## 2024-11-12 RX ORDER — CEFAZOLIN SODIUM 2 G/100ML
2 INJECTION, SOLUTION INTRAVENOUS ONCE
Status: CANCELLED | OUTPATIENT
Start: 2024-11-12 | End: 2024-11-12

## 2024-11-12 RX ORDER — OXYCODONE HYDROCHLORIDE 5 MG/1
5 TABLET ORAL EVERY 6 HOURS PRN
Qty: 16 TABLET | Refills: 0 | Status: SHIPPED | OUTPATIENT
Start: 2024-11-12 | End: 2024-11-19

## 2024-11-12 RX ORDER — PROPOFOL 10 MG/ML
INJECTION, EMULSION INTRAVENOUS AS NEEDED
Status: DISCONTINUED | OUTPATIENT
Start: 2024-11-12 | End: 2024-11-12

## 2024-11-12 RX ORDER — SODIUM CHLORIDE, SODIUM LACTATE, POTASSIUM CHLORIDE, CALCIUM CHLORIDE 600; 310; 30; 20 MG/100ML; MG/100ML; MG/100ML; MG/100ML
100 INJECTION, SOLUTION INTRAVENOUS CONTINUOUS
Status: DISCONTINUED | OUTPATIENT
Start: 2024-11-12 | End: 2024-11-12 | Stop reason: HOSPADM

## 2024-11-12 RX ORDER — METOCLOPRAMIDE HYDROCHLORIDE 5 MG/ML
INJECTION INTRAMUSCULAR; INTRAVENOUS AS NEEDED
Status: DISCONTINUED | OUTPATIENT
Start: 2024-11-12 | End: 2024-11-12

## 2024-11-12 RX ORDER — LIDOCAINE HCL/PF 100 MG/5ML
SYRINGE (ML) INTRAVENOUS AS NEEDED
Status: DISCONTINUED | OUTPATIENT
Start: 2024-11-12 | End: 2024-11-12

## 2024-11-12 RX ORDER — ONDANSETRON HYDROCHLORIDE 2 MG/ML
4 INJECTION, SOLUTION INTRAVENOUS ONCE AS NEEDED
Status: DISCONTINUED | OUTPATIENT
Start: 2024-11-12 | End: 2024-11-12 | Stop reason: HOSPADM

## 2024-11-12 RX ORDER — SODIUM CHLORIDE 0.9 G/100ML
IRRIGANT IRRIGATION AS NEEDED
Status: DISCONTINUED | OUTPATIENT
Start: 2024-11-12 | End: 2024-11-12 | Stop reason: HOSPADM

## 2024-11-12 RX ORDER — MEPERIDINE HYDROCHLORIDE 25 MG/ML
12.5 INJECTION INTRAMUSCULAR; INTRAVENOUS; SUBCUTANEOUS EVERY 10 MIN PRN
Status: DISCONTINUED | OUTPATIENT
Start: 2024-11-12 | End: 2024-11-12 | Stop reason: HOSPADM

## 2024-11-12 RX ORDER — SCOLOPAMINE TRANSDERMAL SYSTEM 1 MG/1
PATCH, EXTENDED RELEASE TRANSDERMAL AS NEEDED
Status: DISCONTINUED | OUTPATIENT
Start: 2024-11-12 | End: 2024-11-12

## 2024-11-12 RX ORDER — OXYCODONE HYDROCHLORIDE 5 MG/1
5 TABLET ORAL EVERY 4 HOURS PRN
Status: DISCONTINUED | OUTPATIENT
Start: 2024-11-12 | End: 2024-11-12 | Stop reason: HOSPADM

## 2024-11-12 RX ORDER — DROPERIDOL 2.5 MG/ML
0.62 INJECTION, SOLUTION INTRAMUSCULAR; INTRAVENOUS ONCE AS NEEDED
Status: DISCONTINUED | OUTPATIENT
Start: 2024-11-12 | End: 2024-11-12 | Stop reason: HOSPADM

## 2024-11-12 RX ORDER — FENTANYL CITRATE 50 UG/ML
INJECTION, SOLUTION INTRAMUSCULAR; INTRAVENOUS AS NEEDED
Status: DISCONTINUED | OUTPATIENT
Start: 2024-11-12 | End: 2024-11-12

## 2024-11-12 RX ORDER — CEPHALEXIN 500 MG/1
500 CAPSULE ORAL 3 TIMES DAILY
Qty: 21 CAPSULE | Refills: 0 | Status: SHIPPED | OUTPATIENT
Start: 2024-11-12

## 2024-11-12 RX ORDER — ROCURONIUM BROMIDE 10 MG/ML
INJECTION, SOLUTION INTRAVENOUS AS NEEDED
Status: DISCONTINUED | OUTPATIENT
Start: 2024-11-12 | End: 2024-11-12

## 2024-11-12 RX ORDER — DIPHENHYDRAMINE HYDROCHLORIDE 50 MG/ML
12.5 INJECTION INTRAMUSCULAR; INTRAVENOUS ONCE AS NEEDED
Status: DISCONTINUED | OUTPATIENT
Start: 2024-11-12 | End: 2024-11-12 | Stop reason: HOSPADM

## 2024-11-12 RX ORDER — ALBUTEROL SULFATE 90 UG/1
INHALANT RESPIRATORY (INHALATION) AS NEEDED
Status: DISCONTINUED | OUTPATIENT
Start: 2024-11-12 | End: 2024-11-12

## 2024-11-12 RX ORDER — MIDAZOLAM HYDROCHLORIDE 1 MG/ML
INJECTION INTRAMUSCULAR; INTRAVENOUS AS NEEDED
Status: DISCONTINUED | OUTPATIENT
Start: 2024-11-12 | End: 2024-11-12

## 2024-11-12 RX ORDER — SODIUM CHLORIDE, SODIUM LACTATE, POTASSIUM CHLORIDE, CALCIUM CHLORIDE 600; 310; 30; 20 MG/100ML; MG/100ML; MG/100ML; MG/100ML
20 INJECTION, SOLUTION INTRAVENOUS CONTINUOUS
Status: DISCONTINUED | OUTPATIENT
Start: 2024-11-12 | End: 2024-11-12 | Stop reason: HOSPADM

## 2024-11-12 RX ORDER — ALBUTEROL SULFATE 0.83 MG/ML
2.5 SOLUTION RESPIRATORY (INHALATION) ONCE AS NEEDED
Status: DISCONTINUED | OUTPATIENT
Start: 2024-11-12 | End: 2024-11-12 | Stop reason: HOSPADM

## 2024-11-12 RX ORDER — IPRATROPIUM BROMIDE AND ALBUTEROL SULFATE 2.5; .5 MG/3ML; MG/3ML
3 SOLUTION RESPIRATORY (INHALATION) ONCE
Status: COMPLETED | OUTPATIENT
Start: 2024-11-12 | End: 2024-11-12

## 2024-11-12 SDOH — HEALTH STABILITY: MENTAL HEALTH: CURRENT SMOKER: 0

## 2024-11-12 ASSESSMENT — PAIN - FUNCTIONAL ASSESSMENT
PAIN_FUNCTIONAL_ASSESSMENT: 0-10

## 2024-11-12 ASSESSMENT — PAIN SCALES - GENERAL
PAINLEVEL_OUTOF10: 0 - NO PAIN

## 2024-11-12 NOTE — ANESTHESIA PROCEDURE NOTES
Airway  Date/Time: 11/12/2024 8:14 AM  Urgency: elective    Airway not difficult    Staffing  Performed: CRNA   Authorized by: Pablo Duval MD    Performed by: KATY Greer-MICHAEL  Patient location during procedure: OR    Indications and Patient Condition  Indications for airway management: anesthesia  Spontaneous Ventilation: absent  Sedation level: deep  Preoxygenated: yes  Patient position: sniffing  Mask difficulty assessment: 1 - vent by mask    Final Airway Details  Final airway type: endotracheal airway      Successful airway: ETT     Successful intubation technique: video laryngoscopy  Endotracheal tube insertion site: oral  Blade: Tal  Blade size: #3  ETT size (mm): 7.0  Cormack-Lehane Classification: grade I - full view of glottis  Placement verified by: chest auscultation and capnometry   Measured from: teeth  ETT to teeth (cm): 21  Number of attempts at approach: 1

## 2024-11-12 NOTE — ANESTHESIA PREPROCEDURE EVALUATION
Patient: Elizabeth Goss    Procedure Information       Anesthesia Start Date/Time: 11/12/24 0802    Procedure: CAPSULECTOMY BREAST (Bilateral: Breast) - STAGE 2 BILATERAL BREAST RECONSTRUCTION WITH REMOVAL OF TISSUE EXPANDERS AND PLACEMENT OF GEL IMPLANTS WITH POSSIBLE CAPSULOTOMY, CAPSULECTOMY, CAPSULORRAPHY    Location: [facility] / [facility]    Surgeons: Curry Travis,           Vitals:    11/12/24 0643   BP: (!) 148/93   Pulse: 102   Resp: 16   Temp: 36.6 °C (97.9 °F)   SpO2: 96%       Past Surgical History:   Procedure Laterality Date    BREAST BIOPSY      BREAST SURGERY Bilateral 10/23/2019    REDUCTION MAMMOPLASTY    COLONOSCOPY      MASTECTOMY COMPLETE / SIMPLE Bilateral 07/25/2024    Barbara Campos MD;  Location: [facility];  Service: General Surgery;    RECTAL EXAMINATION UNDER ANESTHESIA  10/20/2022    SENTINEL LYMPH NODE BIOPSY Right 07/25/2024    TONSILLECTOMY Bilateral 08/07/2020    WISDOM TOOTH EXTRACTION       Past Medical History:   Diagnosis Date    Allergic     Anxiety     Body mass index (BMI) 36.0-36.9, adult 01/06/2022    Body mass index (BMI) 39.0-39.9, adult 10/28/2024    Depression     Disease of thyroid gland 2014    Encounter for general adult medical examination without abnormal findings 07/21/2022    Preventative health care    Encounter for general adult medical examination without abnormal findings 11/24/2021    Preventative health care    Encounter for immunization 01/12/2021    Encounter for immunization    Estrogen receptor positive 07/25/2024    Hyperlipidemia     Hypertension 07/21/2022    Hypertension Jan 2021    Hypothyroid 08/07/2020    IBS (irritable bowel syndrome)     IGT (impaired glucose tolerance)     Intraductal carcinoma in situ of right breast 07/25/2024    Lesion of sciatic nerve, left lower limb 08/18/2021    Piriformis syndrome of left side    Menstrual migraine, not intractable, without status migrainosus 08/07/2020    Menstrual migraine without status  migrainosus, not intractable    Other hypersomnia 08/07/2020    Daytime hypersomnolence    Other psychoactive substance use, 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 Authorizing Provider   b complex 0.4 mg tablet Take 1 tablet by mouth once daily.   Yes Historical Provider, MD   buPROPion XL (Wellbutrin XL) 150 mg 24 hr tablet Take 1 tablet (150 mg) by mouth once daily in the morning. Do not crush, chew, or split. 7/22/24 7/22/25 Yes Abhilash Porter DO   cholecalciferol (Vitamin D-3) 25 MCG (1000 UT) capsule Take 1 capsule (25 mcg) by mouth once daily.   Yes Historical Provider, MD   cloNIDine (Catapres) 0.1 mg tablet Take 1 tablet (0.1 mg) by mouth 2 times a day. 1/22/24 1/21/25 Yes Abhilash Porter DO   drospirenone, contraceptive, (Slynd) 4 mg (28) tablet Take 1 tablet by mouth once daily. 11/5/24  Yes ANN Aldridge   fluticasone (Flonase) 50 mcg/actuation nasal spray Administer 1 spray into affected nostril(s) once daily. 12/13/21  Yes Historical Provider, MD   levothyroxine (Synthroid, Levoxyl) 75 mcg tablet Take 1 tablet (75 mcg) by mouth once daily. 1/24/24  Yes Abhilash Porter DO   PARoxetine (PaxiL) 10 mg tablet Take 1 tablet (10 mg) by mouth once daily at bedtime. Take one tablet by mouth at the bedtime 8/7/24 8/7/25 Yes ANN Aldridge   saccharomyces boulardii (Florastor) 250 mg capsule Take 1 capsule (250 mg) by mouth 2 times a day.   Yes Historical Provider, MD     No Known Allergies  Social History     Tobacco Use    Smoking  "status: Never    Smokeless tobacco: Never   Substance Use Topics    Alcohol use: Not Currently         Chemistry    Lab Results   Component Value Date/Time     07/11/2024 0723    K 4.3 07/11/2024 0723     07/11/2024 0723    CO2 24 07/11/2024 0723    BUN 21 07/11/2024 0723    CREATININE 1.07 (H) 07/11/2024 0723    Lab Results   Component Value Date/Time    CALCIUM 9.3 07/11/2024 0723    ALKPHOS 49 07/11/2024 0723    AST 22 07/11/2024 0723    ALT 32 07/11/2024 0723    BILITOT 0.4 07/11/2024 0723          Lab Results   Component Value Date/Time    WBC 6.6 07/11/2024 0723    HGB 13.9 07/11/2024 0723    HCT 42.6 07/11/2024 0723     07/11/2024 0723     No results found for: \"PROTIME\", \"PTT\", \"INR\"  No results found for this or any previous visit (from the past 4464 hours).  No results found for this or any previous visit from the past 1095 days.      Relevant Problems   Cardiac   (+) Essential hypertension      Neuro   (+) Situational mixed anxiety and depressive disorder      GI   (+) Irritable bowel syndrome with both constipation and diarrhea      Endocrine   (+) Class 2 obesity due to excess calories without serious comorbidity with body mass index (BMI) of 39.0 to 39.9 in adult   (+) Primary hypothyroidism       Clinical information reviewed:   Tobacco  Allergies  Meds   Med Hx  Surg Hx  OB Status  Fam Hx  Soc   Hx        NPO Detail:  NPO/Void Status  Date of Last Liquid: 11/11/24  Time of Last Liquid: 2200  Date of Last Solid: 11/11/24  Time of Last Solid: 1900  Last Intake Type: Clear fluids; Food  Time of Last Void: 0712         Physical Exam    Airway  Mallampati: II     Cardiovascular - normal exam     Dental    Pulmonary    Abdominal            Anesthesia Plan    History of general anesthesia?: yes  History of complications of general anesthesia?: no    ASA 2     general     The patient is not a current smoker.  Patient was not previously instructed to abstain from smoking on day of " procedure.  Patient did not smoke on day of procedure.  Education provided regarding risk of obstructive sleep apnea.  intravenous induction   Anesthetic plan and risks discussed with patient.    Plan discussed with CRNA.

## 2024-11-12 NOTE — ANESTHESIA POSTPROCEDURE EVALUATION
Patient: Elizabeth Goss    Procedure Summary       Date: 11/12/24 Room / Location: [facility] / [facility]    Anesthesia Start: 0801 Anesthesia Stop: 0933    Procedure: CAPSULECTOMY BREAST (Bilateral: Breast) Diagnosis:       Intraductal carcinoma in situ of right breast      S/P breast reconstruction, bilateral      Paget's disease of the breast, right      Estrogen receptor positive      (D05.11)      (Z98.890)      (C50.011)      (Z17.0)    Surgeons: Curry Travis DO Responsible Provider: Pablo Duval MD    Anesthesia Type: general ASA Status: 2            Anesthesia Type: general    Vitals Value Taken Time   BP See vitals 11/12/24 0938   Temp  11/12/24 0938   Pulse  11/12/24 0938   Resp  11/12/24 0938   SpO2  11/12/24 0938       Anesthesia Post Evaluation    Patient location during evaluation: PACU  Patient participation: complete - patient participated  Level of consciousness: awake  Pain management: adequate  Multimodal analgesia pain management approach  Airway patency: patent  Two or more strategies used to mitigate risk of obstructive sleep apnea  Cardiovascular status: acceptable  Respiratory status: acceptable  Hydration status: acceptable  Postoperative Nausea and Vomiting: none        No notable events documented.

## 2024-11-12 NOTE — DISCHARGE INSTRUCTIONS
The anesthetics, sedatives or narcotics which were given to you today will be acting in your body for the next 24 hours, so you might feel a little sleepy or groggy. This feeling should slowly wear off. Carefully read and follow the instructions.     You received sedation today:  - Do not drive or operate any machinery or power tools of any kind.   - No alcoholic beverages today, not even beer or wine.  - Do not make any important decisions or sign any legal documents.  - No over the counter medications that contain alcohol or that may cause drowsiness.    Call your doctor's office to be advised whether a visit to your nearest Urgent Care or Emergency Department is indicated if you experience any of the following. Take this paper with you if you go.     - If you develop an allergic reaction to the medications that were given during your procedure such as difficulty breathing, rash, hives, severe nausea, vomiting or lightheadedness.  - If you experience chest pain, shortness of breath, fevers and chills.  -If you develop signs and symptoms of excessive bleeding  - If you have not urinated within 8 hours following your procedure.  - If your IV site becomes painful, red, inflamed, or looks infected.    Nurse Signature Date___________________   Patient/Responsible Party Signature Date___________________

## 2024-11-12 NOTE — OP NOTE
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]     * Estrogen receptor positive [Z17.0]     Procedures  CAPSULECTOMY BREAST  81486 - ME REPLACEMENT TISSUE EXPANDER W/PERMANENT IMPLANT    CAPSULECTOMY BREAST  96304 - ME REVISION MOIRA-IMPLANT CAPSULE BREAST    CAPSULECTOMY BREAST  44738 - ME MOIRA-IMPLANT CAPSULECTOMY BREAST COMPLETE  Bilateral stage 2 breast reconstruction  BL removal of expanders  BL placement of gel implants  BL partial capsulectomy  BL placement closed incision vac prevena 13cm    Surgeons      * Curry Travis - Primary    Resident/Fellow/Other Assistant:  Surgeons and Role:  * No surgeons found with a matching role *  Ana     Staff:   Circulator: Javier Lanier Person: Bertha  Surgical Assistant: Aan    Anesthesia Staff: Anesthesiologist: Pablo Duval MD  CRNA: KATY Greer-MICHAEL    Procedure Summary  Anesthesia: General  ASA: II  Estimated Blood Loss: 8mL  Intra-op Medications:   Administrations occurring from 0740 to 0955 on 24:   Medication Name Total Dose   ceFAZolin (Ancef) vial 1 g 2 g   dexAMETHasone (Decadron) injection 4 mg/mL 8 mg   fentaNYL (Sublimaze) injection 50 mcg/mL 100 mcg   lactated Ringer's infusion Cannot be calculated   lidocaine (cardiac) injection 2% prefilled syringe 100 mg   metoclopramide (Reglan) injection 10 mg   midazolam PF (Versed) injection 1 mg/mL 2 mg   ondansetron (Zofran) 2  mg/mL injection 4 mg   propofol (Diprivan) injection 10 mg/mL 200 mg   rocuronium (ZeMuron) 50 mg/5 mL injection 40 mg   scopolamine (Transderm-Scop) 1 mg/3 days patch 1 patch   sugammadex (Bridion) 200 mg/2 mL injection 200 mg              Anesthesia Record               Intraprocedure I/O Totals       None           Specimen:   ID Type Source Tests Collected by Time   1 : RIGHT MASTECTOMY SCAR, LONG LATERAL, SHORT SUPERIOR Tissue BREAST MASTECTOMY RIGHT SURGICAL PATHOLOGY EXAM Curry Travis,  11/12/2024 0826           Implants:  Implants       Type Name Action Serial No.      Breast Implant SIZER, MAMMARY, ROUND, STYLE 68 & 168, 650-700CC - [id number] Used, Not Implanted      Breast Implant SIZER, MAMMARY, ROUND, STYLE 68 & 168, 650-700CC - [id number] Used, Not Implanted       NATRELLE INSPIRA COHESIVE BREAST IMPLANT SMOOTH ROUND MODERATE PROFILE Implanted       NATRELLE INSPIRA COHESIVE BREAST IMPLANT SMOOTH ROUND MODERATE PROFILE Implanted           Right [id number] SN [id number]  Left [id number] SN [id number]    Findings: bimanual negative for masses BL, about 95% of alloderm was incorporated and some loose areas were excised.    Indications: Elizabeth Goss is an 38 y.o. female who is having surgery for D05.11  Z98.890  C50.011  Z17.0. Reasonable expectations were discussed and she has some increased risk of healing issues due to co-morbidities.    The patient was seen in the preoperative area. The risks, benefits, complications, treatment options, non-operative alternatives, expected recovery and outcomes were discussed with the patient. The possibilities of reaction to medication, pulmonary aspiration, injury to surrounding structures, bleeding, recurrent infection, the need for additional procedures, failure to diagnose a condition, and creating a complication requiring transfusion or operation were discussed with the patient. The patient concurred with the proposed plan, giving informed consent.  The site  of surgery was properly noted/marked if necessary per policy. The patient has been actively warmed in preoperative area. Preoperative antibiotics have been ordered and given within 1 hours of incision. Venous thrombosis prophylaxis have been ordered including bilateral sequential compression devices    Procedure Details: Patient marked in the preop area in upright position. Taken to operative suite in supine position.  Huddle, safety checks, preop abx, sterile prep and drape, and another timeout were done.  Using 15 blade started on the right side where incision made over right mastectomy scar as ellipse sent to path as short superior and long lateral.  Cautery used to develop flap superiorly and enter capsule through alloderm. Expander was cut drained and removed under direct vision.  Capsule examined and bimanual negative.  Superior and medial capsulotomy and partial capsulectomy was done with cautery and meticulous hemostasis was noted.  Some loose allooderm was excised.  Temporary sizer placed and later removed and filled to volume to select implant size.  Capsule temporary closure with hemostats and 3-0 vicryl.      The left was done symmetrically and indentically and this was a bilateral procedure.    Using minimal touch technique and new gloves and osorio funnel the gel implant [id number] placed in pocket and pocket closed.  Skin closed in layers 3-0 monocryl deep and deep dermal and then 4-0 monocryl running subcuticular.  Steris ABD for dressing.   Complications:  None; patient tolerated the procedure well.    Disposition: PACU - hemodynamically stable.  Condition: stable         Task Performed by RNFA or Surgical Assistant:  Opening closing and retraction assistance    Operative time: 1hr 8min    Attending Attestation: I performed the procedure.    Curry Travis  Phone Number: [phone]

## 2024-11-12 NOTE — INTERVAL H&P NOTE
H&P reviewed. The patient was examined and there are no changes to the H&P.  No changes since last visit and pcp recently saw the patient.  Discussed reasonable expectations and RBAPC and informed consent and patient wishes to proceed.  Reviewed postop instructions with patient and .    Curry Travis, DO

## 2024-11-13 DIAGNOSIS — E03.9 PRIMARY HYPOTHYROIDISM: ICD-10-CM

## 2024-11-13 DIAGNOSIS — N95.1 MENOPAUSAL VASOMOTOR SYNDROME: ICD-10-CM

## 2024-11-13 RX ORDER — CLONIDINE HYDROCHLORIDE 0.1 MG/1
0.1 TABLET ORAL 2 TIMES DAILY
Qty: 180 TABLET | Refills: 3 | Status: SHIPPED | OUTPATIENT
Start: 2024-11-13

## 2024-11-13 RX ORDER — LEVOTHYROXINE SODIUM 75 UG/1
75 TABLET ORAL DAILY
Qty: 90 TABLET | Refills: 3 | Status: SHIPPED | OUTPATIENT
Start: 2024-11-13

## 2024-11-19 LAB
LABORATORY COMMENT REPORT: NORMAL
PATH REPORT.FINAL DX SPEC: NORMAL
PATH REPORT.GROSS SPEC: NORMAL
PATH REPORT.RELEVANT HX SPEC: NORMAL
PATH REPORT.TOTAL CANCER: NORMAL

## 2024-11-25 ENCOUNTER — APPOINTMENT (OUTPATIENT)
Dept: RADIOLOGY | Facility: CLINIC | Age: 39
End: 2024-11-25
Payer: COMMERCIAL

## 2024-11-27 ENCOUNTER — HOSPITAL ENCOUNTER (OUTPATIENT)
Dept: RADIOLOGY | Facility: CLINIC | Age: 39
Discharge: HOME | End: 2024-11-27
Payer: COMMERCIAL

## 2024-11-27 DIAGNOSIS — M54.16 LUMBAR RADICULOPATHY: ICD-10-CM

## 2024-11-27 PROCEDURE — 72148 MRI LUMBAR SPINE W/O DYE: CPT

## 2024-12-02 ENCOUNTER — DOCUMENTATION (OUTPATIENT)
Dept: ORTHOPEDIC SURGERY | Facility: CLINIC | Age: 39
End: 2024-12-02
Payer: COMMERCIAL

## 2024-12-02 NOTE — PROGRESS NOTES
I spoke with Elizabeth.  Her lumbar MRI is unremarkable for any acute abnormality.  There are mild degenerative change and a slight retrolisthesis of L5 on S1 but no severe neural compression.    She had a pelvic MRI done at the [facility] earlier this summer that also did not show any acute or severe abnormality.    She remains symptomatic with coccydynia.    I would not recommend any surgical intervention.  I would continue with the conservative management with exercise and conditioning and perhaps a consultation with pain management.  If her symptoms were to change or worsen in any severity further evaluation would be indicated.    ** Dictated with voice recognition software and not immediately reviewed for errors in grammar and/or spelling **

## 2024-12-04 ENCOUNTER — ANESTHESIA (OUTPATIENT)
Dept: GASTROENTEROLOGY | Facility: HOSPITAL | Age: 39
End: 2024-12-04
Payer: COMMERCIAL

## 2024-12-04 ENCOUNTER — HOSPITAL ENCOUNTER (OUTPATIENT)
Dept: GASTROENTEROLOGY | Facility: HOSPITAL | Age: 39
Discharge: HOME | End: 2024-12-04
Payer: COMMERCIAL

## 2024-12-04 ENCOUNTER — ANESTHESIA EVENT (OUTPATIENT)
Dept: GASTROENTEROLOGY | Facility: HOSPITAL | Age: 39
End: 2024-12-04
Payer: COMMERCIAL

## 2024-12-04 VITALS
DIASTOLIC BLOOD PRESSURE: 83 MMHG | TEMPERATURE: 97.3 F | HEART RATE: 68 BPM | SYSTOLIC BLOOD PRESSURE: 122 MMHG | RESPIRATION RATE: 16 BRPM | OXYGEN SATURATION: 95 %

## 2024-12-04 DIAGNOSIS — Z12.11 COLON CANCER SCREENING: Primary | ICD-10-CM

## 2024-12-04 PROCEDURE — G0121 COLON CA SCRN NOT HI RSK IND: HCPCS | Performed by: SURGERY

## 2024-12-04 PROCEDURE — 7100000009 HC PHASE TWO TIME - INITIAL BASE CHARGE

## 2024-12-04 PROCEDURE — 2500000004 HC RX 250 GENERAL PHARMACY W/ HCPCS (ALT 636 FOR OP/ED): Performed by: NURSE ANESTHETIST, CERTIFIED REGISTERED

## 2024-12-04 PROCEDURE — 45378 DIAGNOSTIC COLONOSCOPY: CPT | Performed by: SURGERY

## 2024-12-04 PROCEDURE — 7100000010 HC PHASE TWO TIME - EACH INCREMENTAL 1 MINUTE

## 2024-12-04 PROCEDURE — 3700000002 HC GENERAL ANESTHESIA TIME - EACH INCREMENTAL 1 MINUTE

## 2024-12-04 PROCEDURE — 3700000001 HC GENERAL ANESTHESIA TIME - INITIAL BASE CHARGE

## 2024-12-04 RX ORDER — LIDOCAINE HYDROCHLORIDE 20 MG/ML
INJECTION, SOLUTION INFILTRATION; PERINEURAL AS NEEDED
Status: DISCONTINUED | OUTPATIENT
Start: 2024-12-04 | End: 2024-12-04

## 2024-12-04 RX ORDER — PROPOFOL 10 MG/ML
INJECTION, EMULSION INTRAVENOUS AS NEEDED
Status: DISCONTINUED | OUTPATIENT
Start: 2024-12-04 | End: 2024-12-04

## 2024-12-04 RX ORDER — SODIUM CHLORIDE 0.9 % (FLUSH) 0.9 %
SYRINGE (ML) INJECTION AS NEEDED
Status: DISCONTINUED | OUTPATIENT
Start: 2024-12-04 | End: 2024-12-04

## 2024-12-04 SDOH — HEALTH STABILITY: MENTAL HEALTH: CURRENT SMOKER: 0

## 2024-12-04 ASSESSMENT — PAIN SCALES - GENERAL
PAINLEVEL_OUTOF10: 0 - NO PAIN
PAINLEVEL_OUTOF10: 0 - NO PAIN
PAIN_LEVEL: 0
PAINLEVEL_OUTOF10: 0 - NO PAIN
PAINLEVEL_OUTOF10: 0 - NO PAIN

## 2024-12-04 ASSESSMENT — PAIN - FUNCTIONAL ASSESSMENT
PAIN_FUNCTIONAL_ASSESSMENT: 0-10

## 2024-12-04 ASSESSMENT — ENCOUNTER SYMPTOMS
ABDOMINAL PAIN: 0
CONSTIPATION: 0
CHILLS: 0
VOMITING: 0
DIZZINESS: 0
SHORTNESS OF BREATH: 0
HEADACHES: 0
COUGH: 0
FEVER: 0
DIARRHEA: 0
BLOOD IN STOOL: 0
PALPITATIONS: 0
NAUSEA: 0

## 2024-12-04 ASSESSMENT — COLUMBIA-SUICIDE SEVERITY RATING SCALE - C-SSRS
1. IN THE PAST MONTH, HAVE YOU WISHED YOU WERE DEAD OR WISHED YOU COULD GO TO SLEEP AND NOT WAKE UP?: NO
2. HAVE YOU ACTUALLY HAD ANY THOUGHTS OF KILLING YOURSELF?: NO
6. HAVE YOU EVER DONE ANYTHING, STARTED TO DO ANYTHING, OR PREPARED TO DO ANYTHING TO END YOUR LIFE?: NO

## 2024-12-04 NOTE — ANESTHESIA PREPROCEDURE EVALUATION
Patient: Elizabeth Goss    Procedure Information       Date/Time: 12/04/24 0945    Scheduled providers: Beatrice Gutierrez MD    Procedure: COLONOSCOPY    Location: [facility]            Relevant Problems   Anesthesia (within normal limits)      Cardiac   (+) Essential hypertension      Neuro   (+) Situational mixed anxiety and depressive disorder      GI   (+) Irritable bowel syndrome with both constipation and diarrhea      Endocrine   (+) Class 2 obesity due to excess calories without serious comorbidity with body mass index (BMI) of 39.0 to 39.9 in adult   (+) Primary hypothyroidism       Clinical information reviewed:   Tobacco  Allergies  Meds   Med Hx  Surg Hx  OB Status  Fam Hx  Soc   Hx        NPO Detail:  NPO/Void Status  Carbohydrate Drink Given Prior to Surgery? : N  Date of Last Liquid: 12/04/24  Time of Last Liquid: 0715  Date of Last Solid: 12/02/24  Time of Last Solid: 2000  Last Intake Type: Clear fluids  Time of Last Void: 0800         Physical Exam    Airway  Mallampati: I  TM distance: >3 FB  Neck ROM: full     Cardiovascular - normal exam     Dental - normal exam     Pulmonary - normal exam     Abdominal   (+) obese             Anesthesia Plan    History of general anesthesia?: yes  History of complications of general anesthesia?: yes    ASA 2     MAC     The patient is not a current smoker.    intravenous induction   Anesthetic plan and risks discussed with patient.    Plan discussed with CRNA.

## 2024-12-04 NOTE — ANESTHESIA POSTPROCEDURE EVALUATION
Patient: Elizabeth Goss    Procedure Summary       Date: 12/04/24 Room / Location: [facility]    Anesthesia Start: 0937 Anesthesia Stop: 1010    Procedure: COLONOSCOPY Diagnosis:       Colon cancer screening      Colon cancer screening    Scheduled Providers: Beatrice Gutierrez MD Responsible Provider: MEAGHAN Mejia    Anesthesia Type: MAC ASA Status: 2            Anesthesia Type: MAC    Vitals Value Taken Time   /73 12/04/24 1008   Temp 36.6 °C (97.8 °F) 12/04/24 1008   Pulse 73 12/04/24 1008   Resp 16 12/04/24 1008   SpO2 92 % 12/04/24 1008       Anesthesia Post Evaluation    Patient location during evaluation: PACU  Patient participation: complete - patient participated  Level of consciousness: sleepy but conscious  Pain score: 0  Pain management: adequate  Airway patency: patent  Cardiovascular status: acceptable and hemodynamically stable  Respiratory status: acceptable, spontaneous ventilation and room air  Hydration status: acceptable  Postoperative Nausea and Vomiting: none        There were no known notable events for this encounter.

## 2024-12-04 NOTE — H&P
GENERAL SURGERY HISTORY AND PHYSICAL    Elizabeth Goss   1985   [id number]     History Of Present Illness  Elizabeth Goss is a 38 y.o. female presenting for colonoscopy.     Past Medical History  She has a past medical history of Allergic, Anxiety, Body mass index (BMI) 36.0-36.9, adult (01/06/2022), Body mass index (BMI) 39.0-39.9, adult (10/28/2024), Depression, Disease of thyroid gland (2014), Encounter for general adult medical examination without abnormal findings (07/21/2022), Encounter for general adult medical examination without abnormal findings (11/24/2021), Encounter for immunization (01/12/2021), Estrogen receptor positive (07/25/2024), Hyperlipidemia, Hypertension (07/21/2022), Hypertension (Jan 2021), Hypothyroid (08/07/2020), IBS (irritable bowel syndrome), IGT (impaired glucose tolerance), Intraductal carcinoma in situ of right breast (07/25/2024), Lesion of sciatic nerve, left lower limb (08/18/2021), Menstrual migraine, not intractable, without status migrainosus (08/07/2020), Other hypersomnia (08/07/2020), Other psychoactive substance use, 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    Surgical History  She has a past surgical history that includes Breast surgery (Bilateral, 10/23/2019); Tonsillectomy (Bilateral, 08/07/2020); Mastectomy complete / simple (Bilateral, 07/25/2024); Breast biopsy; [place] tooth extraction; Rectal examination under anesthesia (10/20/2022); [place] lymph node biopsy (Right, 07/25/2024); and Colonoscopy.    Medications  Current Outpatient Medications on File Prior to Encounter   Medication Sig Dispense Refill    b complex 0.4 mg tablet Take 1 tablet by mouth once daily.      buPROPion XL (Wellbutrin XL) 150 mg 24 hr tablet Take 1 tablet (150 mg) by mouth  once daily in the morning. Do not crush, chew, or split. 90 tablet 3    cholecalciferol (Vitamin D-3) 25 MCG (1000 UT) capsule Take 1 capsule (25 mcg) by mouth once daily.      cloNIDine (Catapres) 0.1 mg tablet TAKE 1 TABLET TWICE A  tablet 3    drospirenone, contraceptive, (Slynd) 4 mg (28) tablet Take 1 tablet by mouth once daily. 90 tablet 3    fluticasone (Flonase) 50 mcg/actuation nasal spray Administer 1 spray into affected nostril(s) once daily.      PARoxetine (PaxiL) 10 mg tablet Take 1 tablet (10 mg) by mouth once daily at bedtime. Take one tablet by mouth at the bedtime 90 tablet 3    Synthroid 75 mcg tablet TAKE 1 TABLET ONCE DAILY 90 tablet 3    cephalexin (Keflex) 500 mg capsule Take 1 capsule (500 mg) by mouth 3 times a day. 21 capsule 0     No current facility-administered medications on file prior to encounter.       Allergies  Patient has no known allergies.     Social History  She reports that she has never smoked. She has never used smokeless tobacco. She reports that she does not currently use alcohol. She reports that she does not use drugs.    Family History  Family History   Problem Relation Name Age of Onset    Colon cancer Father Mc     Hypertension Father Mc     Colon polyps Father Mc         Review of Systems   Constitutional:  Negative for chills and fever.   Respiratory:  Negative for cough and shortness of breath.    Cardiovascular:  Negative for chest pain and palpitations.   Gastrointestinal:  Negative for abdominal pain, blood in stool, constipation, diarrhea, nausea and vomiting.   Neurological:  Negative for dizziness and headaches.   All other systems reviewed and are negative.      Last Recorded Vitals  Blood pressure 124/83, pulse 83, temperature 36.4 °C (97.6 °F), temperature source Temporal, resp. rate 16, SpO2 95%.     Physical Exam  Constitutional:       General: She is not in acute distress.     Appearance: Normal appearance. She is not ill-appearing.   HENT:       Head: Normocephalic and atraumatic.   Cardiovascular:      Rate and Rhythm: Normal rate and regular rhythm.   Pulmonary:      Effort: Pulmonary effort is normal. No respiratory distress.      Breath sounds: Normal breath sounds.   Abdominal:      General: There is no distension.      Palpations: Abdomen is soft.      Tenderness: There is no abdominal tenderness. There is no guarding.   Musculoskeletal:         General: No swelling.   Skin:     General: Skin is warm and dry.   Neurological:      Mental Status: She is alert and oriented to person, place, and time. Mental status is at baseline.   Psychiatric:         Mood and Affect: Mood normal.         Behavior: Behavior normal.          Relevant Results  No results found for this or any previous visit (from the past 24 hours).      Assessment and Plan  Active Problems:  There are no active Hospital Problems.    38 y.o. female who presents for colonoscopy.    Beatrice Gutierrez MD, [facility]  General Surgery

## 2024-12-05 NOTE — ADDENDUM NOTE
Encounter addended by: Chely Godinez RN on: 12/5/2024 9:43 AM   Actions taken: Contacts section saved, Flowsheet accepted

## 2024-12-31 DIAGNOSIS — J01.00 ACUTE NON-RECURRENT MAXILLARY SINUSITIS: Primary | ICD-10-CM

## 2024-12-31 RX ORDER — CEFDINIR 300 MG/1
300 CAPSULE ORAL 2 TIMES DAILY
Qty: 20 CAPSULE | Refills: 0 | Status: SHIPPED | OUTPATIENT
Start: 2024-12-31 | End: 2025-01-10

## 2025-01-03 ENCOUNTER — TELEPHONE (OUTPATIENT)
Dept: SURGERY | Facility: CLINIC | Age: 40
End: 2025-01-03
Payer: COMMERCIAL

## 2025-01-03 NOTE — TELEPHONE ENCOUNTER
Patient called in asking about her denial for coverage regarding her Office Visit with Matt for a colon cancer screening on 10/23/24 and a Colonoscopy that was on 12/4/24. I called her insurance and spoke with them. I was informed that because patient was not 43 at the time, the office visit will not be covered. However, since she has previous family history of colon cancer and has had breast cancer in the past, she is covered for the colonoscopy starting at age 38. Colonoscopy will not be covered again until December 2027 (3 years) . Patient was informed and has no questions at this time. Reference number for the call is :[id number].

## 2025-01-08 ENCOUNTER — OFFICE VISIT (OUTPATIENT)
Dept: PAIN MEDICINE | Facility: HOSPITAL | Age: 40
End: 2025-01-08
Payer: COMMERCIAL

## 2025-01-08 VITALS
BODY MASS INDEX: 38.07 KG/M2 | WEIGHT: 223 LBS | SYSTOLIC BLOOD PRESSURE: 129 MMHG | HEIGHT: 64 IN | DIASTOLIC BLOOD PRESSURE: 92 MMHG | RESPIRATION RATE: 16 BRPM | HEART RATE: 89 BPM | OXYGEN SATURATION: 96 %

## 2025-01-08 DIAGNOSIS — M54.50 LUMBAR PAIN: ICD-10-CM

## 2025-01-08 DIAGNOSIS — M53.3 NONTRAUMATIC COCCYDYNIA: Primary | ICD-10-CM

## 2025-01-08 PROCEDURE — 3080F DIAST BP >= 90 MM HG: CPT | Performed by: PHYSICAL MEDICINE & REHABILITATION

## 2025-01-08 PROCEDURE — 99204 OFFICE O/P NEW MOD 45 MIN: CPT | Performed by: PHYSICAL MEDICINE & REHABILITATION

## 2025-01-08 PROCEDURE — 3008F BODY MASS INDEX DOCD: CPT | Performed by: PHYSICAL MEDICINE & REHABILITATION

## 2025-01-08 PROCEDURE — 3074F SYST BP LT 130 MM HG: CPT | Performed by: PHYSICAL MEDICINE & REHABILITATION

## 2025-01-08 PROCEDURE — 99214 OFFICE O/P EST MOD 30 MIN: CPT | Performed by: PHYSICAL MEDICINE & REHABILITATION

## 2025-01-08 RX ORDER — DIAZEPAM 5 MG/1
5 TABLET ORAL ONCE AS NEEDED
OUTPATIENT
Start: 2025-01-08

## 2025-01-08 ASSESSMENT — ENCOUNTER SYMPTOMS
DEPRESSION: 0
OCCASIONAL FEELINGS OF UNSTEADINESS: 0
LOSS OF SENSATION IN FEET: 0

## 2025-01-08 NOTE — PROGRESS NOTES
Subjective   Patient ID: Elizabeth Goss is a 39 y.o. female who presents for Back Pain.  HPI    Patient presents to office for initial eval of tailbone pain that radiates upwards into the back. Patient states that at one point pain did radiate into the legs but is no longer since having a neuroma removed from the lower back. Pain increases with activity but is worst when sitting/laying in a reclined position could also consider Topamax and applying pressure to the coccyx.  She can usually sit upright and exercise without exacerbation of pain.  Was referred to pain mgmt by spinal surgeon.  She has been dealing with this pain for the past 3 to 4 years.  Patient saw Dr. Pena who performed a ganglion impar with only 1 to 2 days of relief, on review of his procedure note she had 0 pain after the local anesthetic was placed.  Since then, she has done pelvic floor physical therapy with internal manipulation, needling, electrotherapy, and chiropractor with minimal relief.  She reports the biggest improvement was after a neuroma removal with plastic surgery in 2023.  This reduced her pain from a 6-7/10 to a 4-5/10.  She takes Tylenol and ibuprofen for pain.     Of note had double mastectomy in July 2024.     Pain Score: 4/10     Injections/Procedures: ganglion impar block with Becky without relief, after further review of her notes she did have 100% relief during the local anesthetic phase.,     Neuromodulators/Other Medications: none    Other: positioning, chiropractor, pelvic floor therapy, physical therapy with interval manipulation, needling, electrotherapy      OSWESTRY: 10        Review of Systems     Current Outpatient Medications   Medication Instructions    b complex 0.4 mg tablet 1 tablet, Daily    buPROPion XL (WELLBUTRIN XL) 150 mg, oral, Every morning, Do not crush, chew, or split.    cefdinir (OMNICEF) 300 mg, oral, 2 times daily    cholecalciferol (VITAMIN D-3) 25 mcg, Daily    cloNIDine (CATAPRES)  0.1 mg, oral, 2 times daily    drospirenone, contraceptive, (Slynd) 4 mg (28) tablet 1 tablet, oral, Daily    fluticasone (Flonase) 50 mcg/actuation nasal spray 1 spray, Daily    PARoxetine (PAXIL) 10 mg, oral, Nightly, Take one tablet by mouth at the bedtime    Synthroid 75 mcg, oral, Daily        Past Medical History:   Diagnosis Date    Allergic     Anxiety     Body mass index (BMI) 36.0-36.9, adult 01/06/2022    Body mass index (BMI) 39.0-39.9, adult 10/28/2024    Depression     Disease of thyroid gland 2014    Encounter for general adult medical examination without abnormal findings 07/21/2022    Preventative health care    Encounter for general adult medical examination without abnormal findings 11/24/2021    Preventative health care    Encounter for immunization 01/12/2021    Encounter for immunization    Estrogen receptor positive 07/25/2024    Hyperlipidemia     Hypertension 07/21/2022    Hypertension Jan 2021    Hypothyroid 08/07/2020    IBS (irritable bowel syndrome)     IGT (impaired glucose tolerance)     Intraductal carcinoma in situ of right breast 07/25/2024    Lesion of sciatic nerve, left lower limb 08/18/2021    Piriformis syndrome of left side    Menstrual migraine, not intractable, without status migrainosus 08/07/2020    Menstrual migraine without status migrainosus, not intractable    Other hypersomnia 08/07/2020    Daytime hypersomnolence    Other psychoactive substance use, 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 08/07/2020    Drug-induced insomnia    Paget's disease of the breast, right 07/25/2024    Polyarthralgia     PONV (postoperative nausea and vomiting) 2005    S/P breast reconstruction, bilateral 07/25/2024    Stage 1        Past Surgical History:   Procedure Laterality Date    BREAST BIOPSY      BREAST SURGERY Bilateral 10/23/2019    REDUCTION MAMMOPLASTY    COLONOSCOPY      MASTECTOMY COMPLETE / SIMPLE Bilateral 07/25/2024    Barbara Campos MD;   Location: [facility];  Service: General Surgery;    RECTAL EXAMINATION UNDER ANESTHESIA  10/20/2022    SENTINEL LYMPH NODE BIOPSY Right 07/25/2024    TONSILLECTOMY Bilateral 08/07/2020    WISDOM TOOTH EXTRACTION          Family History   Problem Relation Name Age of Onset    Colon cancer Father Cm     Hypertension Father Mc     Colon polyps Father Mc         No Known Allergies     MR lumbar spine wo IV contrast 11/27/2024    Narrative  Interpreted By:  Riley Crane,  STUDY:  MR LUMBAR SPINE WO IV CONTRAST;  11/27/2024 4:04 pm    INDICATION:  Signs/Symptoms:lumbar radiculopathy.    COMPARISON:  None.    ACCESSION NUMBER(S):  [id number]    ORDERING CLINICIAN:  WINNIE ORTEGA    TECHNIQUE:  Sagittal STIR, sagittal T2, sagittal T1, axial T2, axial T1 weighted  MRI images of the lumbar spine were obtained without intravenous  contrast administration.    FINDINGS:  There is a transitional lumbosacral vertebral body which for the sake  of this dictation will be labeled S1.    There is a minimal retrolisthesis of L5 on S1.    There are mild degenerative signal changes noted along endplates  within lumbar region.    There is a 22 mm hemangioma noted within the L3 vertebral body. There  is a 15 mm hemangioma within the S2 vertebral body. There are  additional smaller scattered subcentimeter hemangiomas and/or focal  fatty rests.    The visualized spinal cord demonstrates no signal abnormality within  it. Utilizing the above numbering scheme, the conus medullaris  terminates at the upper L2 level.    There is diminished disc signal at the L4/5 and L5/S1 levels  compatible with degenerative disc disease.    At the L5/S1 level,  there is minimal retrolisthesis of L5 on S1,  mild posterior osteophytic spurring and a posterior disc bulge  asymmetrically more pronounced to the left of midline along with mild  degenerative facet changes. There is encroachment upon the lateral  recesses left greater than right without  significant narrowing of the  thecal sac within the spinal canal. There is moderate encroachment  upon the neural foramen bilaterally.    At the L4/L5 level,  there is a small focus of bright signal on the  STIR and T2 weighted images noted along the posterior margin of the  disc compatible with an annular tear. There is a mild posterior disc  bulge slightly more pronounced to the left of midline along with  degenerative facet changes and mild ligamentum flavum hypertrophy  contributing to mild spinal canal narrowing. There is mild  encroachment upon the neural foramen bilaterally.    At the L3/L4 level,  there is no significant spinal canal stenosis or  neuroforaminal stenosis.    At the L2/L3 level,  there is no significant spinal canal stenosis or  neuroforaminal stenosis.    At the L1/L2 level,  there is no significant spinal canal stenosis or  neuroforaminal stenosis.    At the T12/L1 level,  there is no significant spinal canal stenosis  or neuroforaminal stenosis.    Impression  There is a transitional lumbosacral vertebral body which for the sake  of this dictation will be labeled S1.    There is a minimal retrolisthesis of L5 on S1.    There is multilevel spondylosis with varying degrees of spinal canal  and neural foraminal narrowing as described above.    MACRO:  None.    Signed by: Riley Crane 11/29/2024 2:29 PM  Dictation workstation:   [id number]      Objective     Vitals:    01/08/25 1014   BP: (!) 129/92   Pulse: 89   Resp: 16   SpO2: 96%               Physical Exam    GENERAL EXAM  Vital Signs: Vital signs to include heart rate, respiration rate, blood pressure, and temperature were reviewed.  General Appearance:  Awake, alert, healthy appearing, well developed, No acute distress.  Head: Normocephalic without evidence of head injury.  Neck: The appearance of the neck was normal without swelling with a midline trachea.  Eyes: The eyelids and eyebrows exhibited no abnormalities.  Pupils were not  pin-point.  Sclera was without icterus.  Lungs: Respiration rhythm and depth was normal.  Respiratory movements were normal without labored breathing.  Cardiovascular: No peripheral edema was present.    Neurological: Patient was oriented to time, place, and person.  Speech was normal.  Balance, gait, and stance were unremarkable.    Psychiatric: Appearance was normal with appropriate dress.  Mood was euthymic and affect was normal.  Skin: Affected regions were without ecchymosis or skin lesions.        Physical exam as above except:  Back:  Straight leg raise negative bilateral  SI joint maneuvers negative  Muscle strength 5 out of 5 bilateral lower extremities  No sensory defects  Tenderness to palpation of the upper buttocks, near previous surgical scar for neuroma removal, tenderness palpation over coccyx  No tenderness around ischial bursa      Assessment/Plan   Diagnoses and all orders for this visit:  Nontraumatic coccydynia  -     FL pain management; Future  -     Sympathetic Block; Future  Lumbar pain  -     Referral to Pain Management  Other orders  -     NPO Diet Except: Sips with meds; Effective now; Standing  -     Height and weight; Standing  -     Insert and maintain peripheral IV; Standing  -     Saline lock IV; Standing  -     POCT pregnancy, urine; Standing  -     Type And Screen; Standing  -     diazePAM (Valium) tablet 5 mg  -     Adult diet Regular; Standing  -     Vital Signs; Standing  -     Notify physician - Standard Parameters; Standing  -     Continue IV fluids ordered pre-procedure; Standing  -     Prior to Discharge O2 Weaning; Standing  -     Pulse oximetry, continuous; Standing  -     Discharge patient; Standing    Assessment:  Elizabeth is a 39-year-old female with clinical presentation most consistent with coccydynia.  She noted some improvement of her buttocks pain after removal of neuroma several years ago.  However, she still notes pain up to 5/10 on her coccyx significantly  impacts her life due to the pain.  We will schedule her for a ganglion impar block/sacrococcygeal joint, hoping for more improvement after several bouts of pelvic floor therapy with internal manipulation and status post neuroma removal.  If this does not provide long-term relief, could consider radiofrequency ablation of the area.  If it does not provide enough pain relief, could consider this as a atypical presentation of referred back pain.  Her MRI shows annular fissure at the L4-5 level.  There is also neuroforaminal stenosis at L4-5, L5-S1.  Could consider lumbar transforaminal ANNELIESE's in the future.  She still diligent at her PT directed exercises at home, should continue.  She is not interested in starting medications at this time.  Is content with taking over-the-counter Tylenol and ibuprofen.  Could also start Topamax if she were interested in starting medication    Plan:  -Schedule ganglion impar block under fluoroscopy.  Denies blood thinner use.  We discussed the risks, benefits and alternatives to the procedure and the patient would like to proceed   -Continue PT directed exercises at home    This note was generated with the aid of dictation software, there may be typos despite my attempts at proofreading.     Layton Birmingham DO  Pain Fellow      I saw and evaluated the patient. I personally obtained the key and critical portions of the history and physical exam or was physically present for key and critical portions performed by the resident/fellow. I reviewed the resident/fellow's documentation and discussed the patient with the resident/fellow. I agree with the resident/fellow's medical decision making as documented in the note.    Celio Lao MD

## 2025-01-24 ENCOUNTER — HOSPITAL ENCOUNTER (OUTPATIENT)
Dept: GASTROENTEROLOGY | Facility: HOSPITAL | Age: 40
Discharge: HOME | End: 2025-01-24
Payer: COMMERCIAL

## 2025-01-24 VITALS
SYSTOLIC BLOOD PRESSURE: 141 MMHG | OXYGEN SATURATION: 97 % | HEIGHT: 64 IN | DIASTOLIC BLOOD PRESSURE: 93 MMHG | TEMPERATURE: 97.8 F | RESPIRATION RATE: 16 BRPM | BODY MASS INDEX: 38.41 KG/M2 | HEART RATE: 91 BPM | WEIGHT: 225 LBS

## 2025-01-24 DIAGNOSIS — M53.3 NONTRAUMATIC COCCYDYNIA: ICD-10-CM

## 2025-01-24 LAB — PREGNANCY TEST URINE, POC: NEGATIVE

## 2025-01-24 PROCEDURE — 2500000004 HC RX 250 GENERAL PHARMACY W/ HCPCS (ALT 636 FOR OP/ED): Performed by: PHYSICAL MEDICINE & REHABILITATION

## 2025-01-24 PROCEDURE — 2550000001 HC RX 255 CONTRASTS: Performed by: PHYSICAL MEDICINE & REHABILITATION

## 2025-01-24 PROCEDURE — 81025 URINE PREGNANCY TEST: CPT | Performed by: PHYSICAL MEDICINE & REHABILITATION

## 2025-01-24 PROCEDURE — 64999 UNLISTED PX NERVOUS SYSTEM: CPT | Performed by: PHYSICAL MEDICINE & REHABILITATION

## 2025-01-24 RX ORDER — LIDOCAINE HYDROCHLORIDE 5 MG/ML
INJECTION, SOLUTION INFILTRATION; INTRAVENOUS AS NEEDED
Status: COMPLETED | OUTPATIENT
Start: 2025-01-24 | End: 2025-01-24

## 2025-01-24 RX ORDER — ROPIVACAINE HYDROCHLORIDE 5 MG/ML
INJECTION, SOLUTION EPIDURAL; INFILTRATION; PERINEURAL AS NEEDED
Status: COMPLETED | OUTPATIENT
Start: 2025-01-24 | End: 2025-01-24

## 2025-01-24 RX ORDER — METHYLPREDNISOLONE ACETATE 40 MG/ML
INJECTION, SUSPENSION INTRA-ARTICULAR; INTRALESIONAL; INTRAMUSCULAR; SOFT TISSUE AS NEEDED
Status: COMPLETED | OUTPATIENT
Start: 2025-01-24 | End: 2025-01-24

## 2025-01-24 RX ADMIN — IOHEXOL 5 ML: 350 INJECTION, SOLUTION INTRAVENOUS at 11:36

## 2025-01-24 RX ADMIN — LIDOCAINE HYDROCHLORIDE 15 ML: 5 INJECTION, SOLUTION INFILTRATION at 11:36

## 2025-01-24 RX ADMIN — METHYLPREDNISOLONE ACETATE 40 MG: 40 INJECTION, SUSPENSION INTRA-ARTICULAR; INTRALESIONAL; INTRAMUSCULAR; SOFT TISSUE at 11:37

## 2025-01-24 RX ADMIN — ROPIVACAINE HYDROCHLORIDE 10 ML: 5 INJECTION, SOLUTION EPIDURAL; INFILTRATION; PERINEURAL at 11:37

## 2025-01-24 ASSESSMENT — PAIN - FUNCTIONAL ASSESSMENT
PAIN_FUNCTIONAL_ASSESSMENT: 0-10
PAIN_FUNCTIONAL_ASSESSMENT: 0-10

## 2025-01-24 ASSESSMENT — COLUMBIA-SUICIDE SEVERITY RATING SCALE - C-SSRS
6. HAVE YOU EVER DONE ANYTHING, STARTED TO DO ANYTHING, OR PREPARED TO DO ANYTHING TO END YOUR LIFE?: NO
1. IN THE PAST MONTH, HAVE YOU WISHED YOU WERE DEAD OR WISHED YOU COULD GO TO SLEEP AND NOT WAKE UP?: NO
2. HAVE YOU ACTUALLY HAD ANY THOUGHTS OF KILLING YOURSELF?: NO

## 2025-01-24 ASSESSMENT — PAIN SCALES - GENERAL
PAINLEVEL_OUTOF10: 3
PAINLEVEL_OUTOF10: 2

## 2025-01-24 NOTE — DISCHARGE INSTRUCTIONS
You had a pain management procedure today.    Observe/ monitor for the following signs & symptoms:  If you notice Excessive bleeding (slow general oozing that completely soaks the dressing, or fresh bright red bleeding).   In either case, apply pressure to the area, elevate it if possible & call your doctor at once.    Also observe for:  Change in color  Numbness/tingling  Coldness to the touch  Swelling  Drainage  Temperature of 101.5 or higher.  Increased, uncontrollable pain.    *If you notice the above signs & symptoms, please call your doctor right away!*      Discharge Instructions:    Your pain may not be gone immediately after this procedure; it generally takes 3 to 5 days for the steroid to work.   Keep the needle site clean & dry for 24 hours.  Continue your present medications.  Make an appointment to see your doctor in 2-3 weeks.  If any problems occur, or if you have any further questions, please call as soon as possible. If you find that you cannot reach your doctor, but feel that the condition nees a doctor's attention, go to the closest emergency department & take this discharge paper with you.       Dr. Lao's Office: [phone]

## 2025-01-30 LAB
ALBUMIN SERPL-MCNC: 4.3 G/DL (ref 3.6–5.1)
ALP SERPL-CCNC: 48 U/L (ref 31–125)
ALT SERPL-CCNC: 16 U/L (ref 6–29)
ANION GAP SERPL CALCULATED.4IONS-SCNC: 11 MMOL/L (CALC) (ref 7–17)
AST SERPL-CCNC: 14 U/L (ref 10–30)
BILIRUB SERPL-MCNC: 0.3 MG/DL (ref 0.2–1.2)
BUN SERPL-MCNC: 18 MG/DL (ref 7–25)
CALCIUM SERPL-MCNC: 9.3 MG/DL (ref 8.6–10.2)
CHLORIDE SERPL-SCNC: 103 MMOL/L (ref 98–110)
CHOLEST SERPL-MCNC: 181 MG/DL
CHOLEST/HDLC SERPL: 3.5 (CALC)
CO2 SERPL-SCNC: 21 MMOL/L (ref 20–32)
CREAT SERPL-MCNC: 0.88 MG/DL (ref 0.5–0.99)
EGFRCR SERPLBLD CKD-EPI 2021: 85 ML/MIN/1.73M2
EST. AVERAGE GLUCOSE BLD GHB EST-MCNC: 123 MG/DL
EST. AVERAGE GLUCOSE BLD GHB EST-SCNC: 6.8 MMOL/L
GLUCOSE SERPL-MCNC: 73 MG/DL (ref 65–99)
HBA1C MFR BLD: 5.9 % OF TOTAL HGB
HDLC SERPL-MCNC: 51 MG/DL
LDLC SERPL CALC-MCNC: 107 MG/DL (CALC)
NONHDLC SERPL-MCNC: 130 MG/DL (CALC)
POTASSIUM SERPL-SCNC: 4.6 MMOL/L (ref 3.5–5.3)
PROT SERPL-MCNC: 6.9 G/DL (ref 6.1–8.1)
SODIUM SERPL-SCNC: 135 MMOL/L (ref 135–146)
TRIGL SERPL-MCNC: 120 MG/DL
TSH SERPL-ACNC: 1.71 MIU/L

## 2025-02-04 ENCOUNTER — APPOINTMENT (OUTPATIENT)
Dept: PRIMARY CARE | Facility: CLINIC | Age: 40
End: 2025-02-04
Payer: COMMERCIAL

## 2025-02-04 VITALS
DIASTOLIC BLOOD PRESSURE: 80 MMHG | HEIGHT: 64 IN | BODY MASS INDEX: 40.46 KG/M2 | WEIGHT: 237 LBS | HEART RATE: 75 BPM | SYSTOLIC BLOOD PRESSURE: 128 MMHG

## 2025-02-04 DIAGNOSIS — C50.011 PAGET'S DISEASE OF THE BREAST, RIGHT: ICD-10-CM

## 2025-02-04 DIAGNOSIS — E66.812 CLASS 2 OBESITY DUE TO EXCESS CALORIES WITHOUT SERIOUS COMORBIDITY WITH BODY MASS INDEX (BMI) OF 39.0 TO 39.9 IN ADULT: ICD-10-CM

## 2025-02-04 DIAGNOSIS — E66.09 CLASS 2 OBESITY DUE TO EXCESS CALORIES WITHOUT SERIOUS COMORBIDITY WITH BODY MASS INDEX (BMI) OF 39.0 TO 39.9 IN ADULT: ICD-10-CM

## 2025-02-04 DIAGNOSIS — E78.5 DYSLIPIDEMIA: ICD-10-CM

## 2025-02-04 DIAGNOSIS — D05.11 DUCTAL CARCINOMA IN SITU (DCIS) OF RIGHT BREAST: ICD-10-CM

## 2025-02-04 DIAGNOSIS — R73.02 IGT (IMPAIRED GLUCOSE TOLERANCE): Primary | ICD-10-CM

## 2025-02-04 DIAGNOSIS — I10 ESSENTIAL HYPERTENSION: ICD-10-CM

## 2025-02-04 DIAGNOSIS — M46.1 BILATERAL SACROILIITIS (CMS-HCC): ICD-10-CM

## 2025-02-04 DIAGNOSIS — N95.1 MENOPAUSAL VASOMOTOR SYNDROME: ICD-10-CM

## 2025-02-04 DIAGNOSIS — E03.9 PRIMARY HYPOTHYROIDISM: ICD-10-CM

## 2025-02-04 PROBLEM — R92.1 CALCIFICATION OF RIGHT BREAST: Status: RESOLVED | Noted: 2024-04-17 | Resolved: 2025-02-04

## 2025-02-04 PROCEDURE — 3079F DIAST BP 80-89 MM HG: CPT | Performed by: INTERNAL MEDICINE

## 2025-02-04 PROCEDURE — 3008F BODY MASS INDEX DOCD: CPT | Performed by: INTERNAL MEDICINE

## 2025-02-04 PROCEDURE — 99213 OFFICE O/P EST LOW 20 MIN: CPT | Performed by: INTERNAL MEDICINE

## 2025-02-04 PROCEDURE — 3074F SYST BP LT 130 MM HG: CPT | Performed by: INTERNAL MEDICINE

## 2025-02-04 PROCEDURE — 1036F TOBACCO NON-USER: CPT | Performed by: INTERNAL MEDICINE

## 2025-02-04 ASSESSMENT — ANXIETY QUESTIONNAIRES
5. BEING SO RESTLESS THAT IT IS HARD TO SIT STILL: NOT AT ALL
4. TROUBLE RELAXING: NOT AT ALL
GAD7 TOTAL SCORE: 0
1. FEELING NERVOUS, ANXIOUS, OR ON EDGE: NOT AT ALL
6. BECOMING EASILY ANNOYED OR IRRITABLE: NOT AT ALL
3. WORRYING TOO MUCH ABOUT DIFFERENT THINGS: NOT AT ALL
7. FEELING AFRAID AS IF SOMETHING AWFUL MIGHT HAPPEN: NOT AT ALL
2. NOT BEING ABLE TO STOP OR CONTROL WORRYING: NOT AT ALL
IF YOU CHECKED OFF ANY PROBLEMS ON THIS QUESTIONNAIRE, HOW DIFFICULT HAVE THESE PROBLEMS MADE IT FOR YOU TO DO YOUR WORK, TAKE CARE OF THINGS AT HOME, OR GET ALONG WITH OTHER PEOPLE: NOT DIFFICULT AT ALL

## 2025-02-04 ASSESSMENT — COLUMBIA-SUICIDE SEVERITY RATING SCALE - C-SSRS
1. IN THE PAST MONTH, HAVE YOU WISHED YOU WERE DEAD OR WISHED YOU COULD GO TO SLEEP AND NOT WAKE UP?: NO
6. HAVE YOU EVER DONE ANYTHING, STARTED TO DO ANYTHING, OR PREPARED TO DO ANYTHING TO END YOUR LIFE?: NO
2. HAVE YOU ACTUALLY HAD ANY THOUGHTS OF KILLING YOURSELF?: NO

## 2025-02-04 ASSESSMENT — ENCOUNTER SYMPTOMS
DEPRESSION: 0
LOSS OF SENSATION IN FEET: 0
OCCASIONAL FEELINGS OF UNSTEADINESS: 0

## 2025-02-04 NOTE — PROGRESS NOTES
"Subjective   Patient ID: Elizabeth Goss is a 39 y.o. female who presents for Follow-up.    HPI     Review of Systems    Objective   /80   Pulse 75   Ht 1.626 m (5' 4\")   Wt 108 kg (237 lb)   BMI 40.68 kg/m²     Physical Exam    Assessment/Plan          "

## 2025-02-04 NOTE — PROGRESS NOTES
"Subjective   Reason for Visit: Elizabeth Goss is an 39 y.o. female here for a fu  visit.     Past Medical, Surgical, and Family History reviewed and updated in chart.    Reviewed all medications by prescribing practitioner or clinical pharmacist (such as prescriptions, OTCs, herbal therapies and supplements) and documented in the medical record.    HPI    Patient Care Team:  Abhilash Porter DO as PCP - General  Gisel Stevens RN as Nurse Navigator (Radiology)     Review of Systems   All other systems reviewed and are negative.      Objective   Vitals:  /80   Pulse 75   Ht 1.626 m (5' 4\")   Wt 108 kg (237 lb)   BMI 40.68 kg/m²       Physical Exam  Vitals and nursing note reviewed.   Constitutional:       General: She is not in acute distress.     Appearance: Normal appearance. She is well-developed. She is obese. She is not toxic-appearing.   HENT:      Head: Normocephalic and atraumatic.      Right Ear: Tympanic membrane and external ear normal.      Left Ear: Tympanic membrane and external ear normal.      Nose: Nose normal.      Mouth/Throat:      Mouth: Mucous membranes are moist.      Pharynx: Oropharynx is clear. No oropharyngeal exudate or posterior oropharyngeal erythema.      Tonsils: No tonsillar exudate. 2+ on the right. 2+ on the left.   Eyes:      Extraocular Movements: Extraocular movements intact.      Conjunctiva/sclera: Conjunctivae normal.   Cardiovascular:      Rate and Rhythm: Normal rate and regular rhythm.      Pulses: Normal pulses.      Heart sounds: Normal heart sounds. No murmur heard.  Pulmonary:      Effort: Pulmonary effort is normal.      Breath sounds: Normal breath sounds.   Abdominal:      General: Abdomen is flat. Bowel sounds are normal.      Palpations: Abdomen is soft.   Musculoskeletal:      Cervical back: Neck supple.   Lymphadenopathy:      Cervical: No cervical adenopathy.   Skin:     General: Skin is warm and dry.      Findings: No rash.   Neurological:    "   Mental Status: She is alert. Mental status is at baseline.   Psychiatric:         Mood and Affect: Mood normal.         Behavior: Behavior normal.         Thought Content: Thought content normal.         Judgment: Judgment normal.     Lifestyle Recommendations  I recommend a whole-food plant-based diet, an eating pattern that encourages the consumption of unrefined plant foods (such as fruits, vegetables, tubers, whole grains, legumes, nuts and seeds) and discourages meats, dairy products, eggs and processed foods.     The AHA/ACC recommends that the patient consume a dietary pattern that emphasizes intake of vegetables, fruits, and whole grains; includes low-fat dairy products, poultry, fish, legumes, non-tropical vegetable oils, and nuts; and limits intake of sodium, sweets, sugar-sweetened beverages, and red meats.  Adapt this dietary pattern to appropriate calorie requirements (a 500-750 kcal/day deficit to loose weight), personal and cultural food preferences, and nutrition therapy for other medical conditions (including diabetes).  Achieve this pattern by following plans such as the [place] Mediterranean, DASH dietary pattern, or AHA diet.     Engage in 2 hours and 30 minutes per week of moderate-intensity physical activity, or 1 hour and 15 minutes (75 minutes) per week of vigorous-intensity aerobic physical activity, or an equivalent combination of moderate and vigorous-intensity aerobic physical activity. Aerobic activity should be performed in episodes of at least 10 minutes preferably spread throughout the week.     Adhering to a heart healthy diet, regular exercise habits, avoidance of tobacco products, and maintenance of a healthy weight are crucial components of their heart disease risk reduction.    Assessment & Plan  IGT (impaired glucose tolerance)  Improvement in overall fasting blood sugar 73 with A1c of 5.9 reevaluate 6 months  Orders:    Follow Up In [facility] - PCP - Established     Comprehensive Metabolic Panel; Future    Hemoglobin A1C; Future    Lipid Panel; Future    Albumin-Creatinine Ratio, Urine Random; Future    Tsh With Reflex To Free T4 If Abnormal; Future    Follow Up In [facility] - PCP - Health Maintenance; Future    Primary hypothyroidism  Clinically euthyroid continue d.a.w. Synthroid 75 mcg daily reevaluate 6 months       Class 2 obesity due to excess calories without serious comorbidity with body mass index (BMI) of 39.0 to 39.9 in adult  Patient restarted exercise program and is taking semaglutide through weight watchers monitor side effects tolerating well at this time reevaluate with next visit       Essential hypertension  Blood pressure stable and improved continue to monitor       Dyslipidemia  LDL cholesterol 107 with triglyceride 120 HDL 51 continue to follow 6-month basis in the setting of mild impaired fasting sugar       Ductal carcinoma in situ (DCIS) of right breast  Reconstructive surgeries stable at this time continue active surveillance with breast surgeon       Bilateral sacroiliitis (CMS-HCC)  Symptoms improved and controlled at this time       Paget's disease of the breast, right  Continue active surveillance with breast surgeon and mammography       Menopausal vasomotor syndrome  Continue with clonidine 0.1 mg twice a day with paroxetine 10 mg nightly and bupropion 150 mg every morning

## 2025-02-04 NOTE — ASSESSMENT & PLAN NOTE
LDL cholesterol 107 with triglyceride 120 HDL 51 continue to follow 6-month basis in the setting of mild impaired fasting sugar

## 2025-02-04 NOTE — ASSESSMENT & PLAN NOTE
Continue with clonidine 0.1 mg twice a day with paroxetine 10 mg nightly and bupropion 150 mg every morning

## 2025-02-04 NOTE — ASSESSMENT & PLAN NOTE
Patient restarted exercise program and is taking semaglutide through weight watchers monitor side effects tolerating well at this time reevaluate with next visit

## 2025-02-04 NOTE — ASSESSMENT & PLAN NOTE
Improvement in overall fasting blood sugar 73 with A1c of 5.9 reevaluate 6 months  Orders:    Follow Up In [facility] - PCP - Established    Comprehensive Metabolic Panel; Future    Hemoglobin A1C; Future    Lipid Panel; Future    Albumin-Creatinine Ratio, Urine Random; Future    Tsh With Reflex To Free T4 If Abnormal; Future    Follow Up In [facility] - PCP - Health Maintenance; Future

## 2025-02-04 NOTE — ASSESSMENT & PLAN NOTE
Reconstructive surgeries stable at this time continue active surveillance with breast surgeon

## 2025-02-05 ENCOUNTER — APPOINTMENT (OUTPATIENT)
Dept: SURGERY | Facility: CLINIC | Age: 40
End: 2025-02-05
Payer: COMMERCIAL

## 2025-02-05 VITALS
BODY MASS INDEX: 40.43 KG/M2 | OXYGEN SATURATION: 98 % | HEART RATE: 111 BPM | SYSTOLIC BLOOD PRESSURE: 145 MMHG | HEIGHT: 64 IN | WEIGHT: 236.8 LBS | DIASTOLIC BLOOD PRESSURE: 86 MMHG

## 2025-02-05 DIAGNOSIS — D05.11 DUCTAL CARCINOMA IN SITU (DCIS) OF RIGHT BREAST: Primary | ICD-10-CM

## 2025-02-05 DIAGNOSIS — Z13.71 BRCA NEGATIVE: ICD-10-CM

## 2025-02-05 PROCEDURE — 99213 OFFICE O/P EST LOW 20 MIN: CPT | Performed by: SURGERY

## 2025-02-05 PROCEDURE — 3077F SYST BP >= 140 MM HG: CPT | Performed by: SURGERY

## 2025-02-05 PROCEDURE — 3008F BODY MASS INDEX DOCD: CPT | Performed by: SURGERY

## 2025-02-05 PROCEDURE — 3079F DIAST BP 80-89 MM HG: CPT | Performed by: SURGERY

## 2025-02-05 PROCEDURE — G2211 COMPLEX E/M VISIT ADD ON: HCPCS | Performed by: SURGERY

## 2025-02-05 PROCEDURE — 1036F TOBACCO NON-USER: CPT | Performed by: SURGERY

## 2025-02-05 ASSESSMENT — ENCOUNTER SYMPTOMS
CHILLS: 0
EYE REDNESS: 0
BRUISES/BLEEDS EASILY: 0
AGITATION: 0
WOUND: 0
FREQUENCY: 0
FATIGUE: 0
HEMATURIA: 0
MYALGIAS: 0
SPEECH DIFFICULTY: 0
COUGH: 0
CONFUSION: 0
DYSURIA: 0
DIARRHEA: 0
WEAKNESS: 0
ARTHRALGIAS: 0
FLANK PAIN: 0
FEVER: 0
POLYPHAGIA: 0
HEADACHES: 0
VOMITING: 0
NAUSEA: 0
SHORTNESS OF BREATH: 0
ABDOMINAL PAIN: 0
EYE PAIN: 0
CONSTIPATION: 0

## 2025-02-05 NOTE — PATIENT INSTRUCTIONS
1.  Continue following up with plastic surgery (Dr. Travis) for follow-up of your reconstructive surgery.  2.  Monthly, continue doing your self breast exams.  If you feel any abnormalities, please call Dr. Campos's office immediately.  [phone]  3.  Follow-up in Dr. Campos's office in 6 months for routine follow-up of your right breast cancer.

## 2025-02-05 NOTE — PROGRESS NOTES
GENERAL SURGERY OFFICE NOTE    Patient: Elizabeth Goss    Age: 39 y.o.   Gender: female    MRN: [id number]    PCP: Abhilash Porter, DO        SUBJECTIVE     Chief Complaint  Follow-up (Patient is here for 6 month right breast cancer follow up. Patient had her reconstructive surgery in November. Patient states that she has a little bit of soreness at the top of her breasts where the cleavage is. Patient states that the scares from her breast reduction in 2005 are now turning brown. )       CHRISTY Johnson returns to the office for an almost 1 year follow up s/p bilateral mastectomy with right SLNB and stage I reconstructive surgery for right breast DCIS.  In the last 6 months, she did undergo her plastic surgery reconstruction with removal of the tissue expanders and placement of the implants.  Although she is not having any surgical complications, she appears to be concerned about the appearance of the reconstruction.  She does have follow-up with plastic surgery next month.  She has not noticed any new lumps or lesions.  She has no new systemic symptoms.  She has noticed some discoloration to her previous mammoplasty scars, but she is using creams and other treatments provided by the plastic surgeon.  She also did undergo her colonoscopy which did not show any significant abnormalities.  She is also undergoing injections for chronic coccygeal pain.    Original risk factors for breast cancer: 38-year-old white female; menarche at age [age]; first live birth at age 32; no previous breast biopsy; no first-degree relative with breast cancer.  She is premenopausal.  Paternal great aunt had breast cancer.  No family history of ovarian cancer, pancreatic cancer or prostate cancer.  This gives her a 5-year Shahrzad score of 0.7% and a lifetime risk of 12.5% which puts her just above average risk category.    ROS  Review of Systems   Constitutional:  Negative for chills, fatigue and fever.   HENT:  Negative for  congestion, ear pain and hearing loss.    Eyes:  Negative for pain and redness.   Respiratory:  Negative for cough and shortness of breath.    Cardiovascular:  Negative for chest pain and leg swelling.   Gastrointestinal:  Negative for abdominal pain, constipation, diarrhea, nausea and vomiting.   Endocrine: Negative for polyphagia.   Genitourinary:  Negative for dysuria, flank pain, frequency and hematuria.   Musculoskeletal:  Negative for arthralgias and myalgias.   Skin:  Negative for rash and wound.   Allergic/Immunologic: Negative for immunocompromised state.   Neurological:  Negative for speech difficulty, weakness and headaches.   Hematological:  Does not bruise/bleed easily.   Psychiatric/Behavioral:  Negative for agitation and confusion.           HISTORY     Past Medical History:   Diagnosis Date    Allergic     Anxiety     Body mass index (BMI) 36.0-36.9, adult 01/06/2022    Body mass index (BMI) 39.0-39.9, adult 10/28/2024    Depression     Disease of thyroid gland 2014    Encounter for general adult medical examination without abnormal findings 07/21/2022    Preventative health care    Encounter for general adult medical examination without abnormal findings 11/24/2021    Preventative health care    Encounter for immunization 01/12/2021    Encounter for immunization    Estrogen receptor positive 07/25/2024    Hyperlipidemia     Hypertension 07/21/2022    Hypertension Jan 2021    Hypothyroid 08/07/2020    IBS (irritable bowel syndrome)     IGT (impaired glucose tolerance)     Intraductal carcinoma in situ of right breast 07/25/2024    Lesion of sciatic nerve, left lower limb 08/18/2021    Piriformis syndrome of left side    Low back pain 8/2020    Menstrual migraine, not intractable, without status migrainosus 08/07/2020    Menstrual migraine without status migrainosus, not intractable    Other hypersomnia 08/07/2020    Daytime hypersomnolence    Other psychoactive substance use, unspecified with  "psychoactive substance-induced sleep disorder 08/07/2020    Drug-induced insomnia    Paget's disease of the breast, right 07/25/2024    Polyarthralgia     PONV (postoperative nausea and vomiting) 2005    S/P breast reconstruction, bilateral 07/25/2024    Stage 1        Past Surgical History:   Procedure Laterality Date    BREAST BIOPSY      BREAST RECONSTRUCTION Bilateral 11/12/2024    BREAST SURGERY Bilateral 7/2024    REDUCTION MAMMOPLASTY    COLONOSCOPY      MASTECTOMY COMPLETE / SIMPLE Bilateral 07/25/2024    Barbara Campos MD;  Location: [facility];  Service: General Surgery;    RECTAL EXAMINATION UNDER ANESTHESIA  10/20/2022    SENTINEL LYMPH NODE BIOPSY Right 07/25/2024    TONSILLECTOMY Bilateral     WISDOM TOOTH EXTRACTION          Family History   Problem Relation Name Age of Onset    Colon cancer Father Mc     Hypertension Father Mc     Colon polyps Father Mc     Cancer Father Mc     Cancer Paternal Grandmother Delaney         No Known Allergies     Social History     Tobacco Use   Smoking Status Never   Smokeless Tobacco Never        Social History     Substance and Sexual Activity   Alcohol Use Not Currently        HOME MEDICATIONS  Current Outpatient Medications   Medication Instructions    b complex 0.4 mg tablet 1 tablet, Daily    buPROPion XL (WELLBUTRIN XL) 150 mg, oral, Every morning, Do not crush, chew, or split.    cholecalciferol (VITAMIN D-3) 25 mcg, Daily    cloNIDine (CATAPRES) 0.1 mg, oral, 2 times daily    drospirenone, contraceptive, (Slynd) 4 mg (28) tablet 1 tablet, oral, Daily    fluticasone (Flonase) 50 mcg/actuation nasal spray 1 spray, Daily    PARoxetine (PAXIL) 10 mg, oral, Nightly, Take one tablet by mouth at the bedtime    Synthroid 75 mcg, oral, Daily          OBJECTIVE   Last Recorded Vitals.  Blood pressure 145/86, pulse (!) 111, height 1.626 m (5' 4\"), weight 107 kg (236 lb 12.8 oz), SpO2 98%.     PHYSICAL EXAM  Physical Exam   Previous exam:  General: " Well-developed, well-nourished and in no acute distress.  Head: Normocephalic. Atraumatic.  Neck/thyroid: Neck is supple.   Eyes: Pupils equal round and reactive to light. Conjunctiva normal.  ENMT: No masses or deformity of external nose. External ears without masses.  Respiratory/Chest:  Normal respiratory effort.  Breast: s/p bilateral mastectomy with implants in place.  Incisions are healing well.  There is no erythema or tissue necrosis.   No palpable lumps or masses.  Lymphatics: No palpable lymphadenopathy of the cervical, supraclavicular or axillary regions.  Cardiovascular: Regular rate and rhythm.   Abdomen: Soft, nontender, nondistended.   Musculoskeletal: Joints and limbs are grossly normal. Normal gait. Normal range of motion of major joints.  Neuro: Oriented to person, place and time. No obvious neurological deficit. Motor strength grossly normal.  Psych: Normal mood and affect.    RESULTS   Labs  No results found for this or any previous visit (from the past 24 hours).    Radiology Resutls      Pathology  FINAL DIAGNOSIS   A. Right breast calcifications, stereotactic-guided core biopsy:  -- Ductal carcinoma in situ, cribriform and papillary patterns, intermediate to high nuclear grade with focal associated microcalcifications, see note.      Note: Immunohistochemical stains for CK5/6 and estrogen receptor are consistent with an atypical proliferation. Multiple deeper levels were reviewed. ER will be reported in an addendum.          peb   Electronically signed by Zacarias Kumar DO on 4/24/2024 at 1123      [facility]   By the signature on this report, the individual or group listed as making the Final Interpretation/Diagnosis certifies that they have reviewed this case.    Comment  [facility]   Case was shown at Breast Case Review Conference via Zoom meeting with concordance    Addendum   Surgical/Block Number:        [id number] A1 and A4  Specimen Site:         Right breast calcifications  Specimen Type:        Core biopsy     Estrogen Receptor (clone SP1):                     Positive                                                                           Percentage with nuclear staining: >95%                                                                             Surgical Pathology  FINAL DIAGNOSIS   A.  Lymph node, right sentinel #1, biopsy:  -- One lymph node negative for carcinoma (0/1)     B.  Breast, right, mastectomy:  --Ductal carcinoma in situ (DCIS), nuclear grade 2-3, cribriform, flat, micropapillary and solid types, with focal calcifications; please see synoptic summary  --DCIS involves intraductal papilloma  --Skin and nipple with no significant histopathologic abnormalities     Note: Immunohistochemical studies have been performed.  P63 and SMMHC are positive in myoepithelial cells.  Pancytokeratin A1/3 is positive in epithelial cells.     This case (B4) has been reviewed at the [facility] breast pathology consensus conference via Zoom on 8/6/24.  Attending: RAJEEV Kumar DO, JOHN Rojas MD, HARMAN Evans MD, ROYCE Abad MD     C.  Lymph node, right sentinel #2, biopsy:  -- One lymph node negative for carcinoma (0/1)     D.  Lymph node, right sentinel #3, biopsy:  -- One lymph node negative for carcinoma (0/1)     E.  Breast, left, mastectomy:  --Breast parenchyma with small fibroadenoma, fibrocystic changes and usual ductal hyperplasia  --Skin and nipple with mild chronic inflammation   Electronically signed by Bang Ortiz MD on 8/7/2024 at 1518      [facility]       ASSESSMENT / PLAN   Diagnoses and all orders for this visit:  Ductal carcinoma in situ (DCIS) of right breast  BRCA negative        Plan  April 2024: RIGHT; retroareolar calc; DCIS; ER+; MRI suggests a 6cm area of involvement including the base of the nipple; s/p bilateral mastectomy with right SLNB and stage I reconstructive surgery; pathology did not identify any invasive carcinoma    1.  Core needle biopsy of the calcifications  demonstrated ductal carcinoma in situ.  Her calcifications are less than 1.5 cm behind the nipple/areolar region.  Unclear as to the extent of DCIS and the involvement of the nipple.  MRI was performed to help determine the extent of disease and possible nipple involvement.  MRI suggested an area of 6 cm with involvement of the base of the nipple.  Final pathology identified DCIS without any evidence of invasive carcinoma.  Lymph nodes negative.   2.  With her younger age at the time of diagnosis of DCIS, she was referred to genetics.  Genetic testing negative for BRCA gene.  3.  She will continue to follow-up with plastic surgery for her reconstruction.  She is concerned about some dimpling on the upper inner quadrants since placement of her implants.  4.  Since she is status post bilateral mastectomy, no mammograms are needed.  5.  Although her DCIS is ER positive, she is status post bilateral mastectomy.  No invasive cancer identified in the final pathology.  Therefore, endocrine therapy is not indicated.  6.  She will follow-up in the office every 6 months for the first 2 years, then yearly.  Follow-up in the office in 6 months for routine follow-up of the right breast DCIS.      Barbara Campos MD, FACS  [facility]  [address] [place] [address];   [address]  [place]  [address] [phone]

## 2025-03-12 ENCOUNTER — OFFICE VISIT (OUTPATIENT)
Dept: PAIN MEDICINE | Facility: HOSPITAL | Age: 40
End: 2025-03-12
Payer: COMMERCIAL

## 2025-03-12 VITALS
HEIGHT: 64 IN | BODY MASS INDEX: 38.93 KG/M2 | WEIGHT: 228 LBS | RESPIRATION RATE: 16 BRPM | OXYGEN SATURATION: 98 % | HEART RATE: 86 BPM | SYSTOLIC BLOOD PRESSURE: 127 MMHG | DIASTOLIC BLOOD PRESSURE: 92 MMHG

## 2025-03-12 DIAGNOSIS — M53.3 COCCYDYNIA: Primary | ICD-10-CM

## 2025-03-12 PROCEDURE — 1036F TOBACCO NON-USER: CPT | Performed by: CLINICAL NURSE SPECIALIST

## 2025-03-12 PROCEDURE — 99214 OFFICE O/P EST MOD 30 MIN: CPT | Performed by: CLINICAL NURSE SPECIALIST

## 2025-03-12 PROCEDURE — 3080F DIAST BP >= 90 MM HG: CPT | Performed by: CLINICAL NURSE SPECIALIST

## 2025-03-12 PROCEDURE — 3008F BODY MASS INDEX DOCD: CPT | Performed by: CLINICAL NURSE SPECIALIST

## 2025-03-12 PROCEDURE — 3074F SYST BP LT 130 MM HG: CPT | Performed by: CLINICAL NURSE SPECIALIST

## 2025-03-12 ASSESSMENT — ENCOUNTER SYMPTOMS
LOSS OF SENSATION IN FEET: 0
DEPRESSION: 0
OCCASIONAL FEELINGS OF UNSTEADINESS: 0

## 2025-03-12 NOTE — PROGRESS NOTES
Subjective   Patient ID: Elizabeth Goss is a 39 y.o. female who presents for coccyx pain/back pain    HPI    39-year-old female presents for follow-up of coccyx/tailbone pain which radiates up into her lower back.  At 1 point pain was radiating into her lower extremities.  MRI lumbar spine consistent with an annular fissure at L4-5 level; neuroforaminal stenosis at L4-5 and L5-S1.  She had resolution of lower extremity symptoms after she had a neuroma removed with plastic surgery in 2023. Previously followed by a different pain physician who performed a ganglion impar block with only 1 to 2 days of relief.  Ganglion impar block was prior to her neuroma surgery.  She has done pelvic floor therapy twice with internal manipulation.  She has done dry needling, electrotherapy and been to a chiropractor with minimal relief.  Utilizes Tylenol and ibuprofen occasionally.  Not interested in additional medications.  Patient scheduled to repeat a ganglion impar block at her last office visit.  Presenting at today's office visit for follow-up after her recent injection.  Patient currently states that she received 98% relief of tailbone pain with her ganglion impar block.  She is rating her pain a 0 out of 10 at today's visit.  States that sitting no longer increases pain.  She has noted she is able to sit longer and lay flat or in her recliner with no discomfort.  She is continuing home physical therapy exercises and stretches consistently.  Patient states she has been able to increase her exercise regimen secondary to improvement in pain.  Continues to utilize Tylenol and ibuprofen when needed.  States she has not needed any medication secondary to improvement in pain.    Patient presents to office for interval follow up. Patient is seen for tailbone pain that radiates upwards into the back. Patient states that at one point pain did radiate into the legs but is no longer since having a neuroma removed from the lower back.  Pain increases with activity and is worst when sitting/laying in a reclined position. Patient presents today post ganglion impar and reports with significant relief.     Pain Score: 0/10    Other Medications/Neuromodulators: none    Injections/Procedures: ganglion impar w/o relief in past with Dr. Pena, 01/24/2025 ganglion impar with significant relief    Other:  chiropractor, PT, needling, electrotherpay, all with minimal relief.                OARRS:  Ruth Keller, APRN-CNP, APRN-CNS on 3/12/2025  9:56 AM  I have personally reviewed the OARRS report for Elizabeth Goss. I have considered the risks of abuse, dependence, addiction and diversion        Review of Systems    ROS:   General: No fevers, chills, weight loss  Skin: Negative for lesions  Eyes: No acute vision changes  Ears: No vertigo  Nose, mouth, throat: No difficulty swallowing or speaking  Respiratory: No cough, shortness of breath, cyanosis  Cardiovascular: Negative for chest pain syncope or palpitation  Gastrointestinal: No constipation, nausea, vomiting  Neurological: Negative for headache,  Psychological: Negative for severe or debilitating anxiety, depression. Negative memory loss  Musculoskeletal: Positive for intermittent myalgia and pain  Endocrine: Negative for weight gain, appetite changes, excessive sweating  Allergy/immune: Negative    All 13 systems were reviewed and are within normal levels except as noted or in the history of present illness.  Positive or pertinent negative responses are noted or were in the history of present illness. As noted, the patient denies significant or impairing weakness in the bilateral upper and lower extremities, medication induced constipation, and bowel or bladder incontinence.     Current Outpatient Medications   Medication Instructions    b complex 0.4 mg tablet 1 tablet, Daily    buPROPion XL (WELLBUTRIN XL) 150 mg, oral, Every morning, Do not crush, chew, or split.    cholecalciferol (VITAMIN  D-3) 25 mcg, Daily    cloNIDine (CATAPRES) 0.1 mg, oral, 2 times daily    drospirenone, contraceptive, (Slynd) 4 mg (28) tablet 1 tablet, oral, Daily    fluticasone (Flonase) 50 mcg/actuation nasal spray 1 spray, Daily    PARoxetine (PAXIL) 10 mg, oral, Nightly, Take one tablet by mouth at the bedtime    Synthroid 75 mcg, oral, Daily        Past Medical History:   Diagnosis Date    Allergic     Anxiety     Body mass index (BMI) 36.0-36.9, adult 01/06/2022    Body mass index (BMI) 39.0-39.9, adult 10/28/2024    Depression     Disease of thyroid gland 2014    Encounter for general adult medical examination without abnormal findings 07/21/2022    Preventative health care    Encounter for general adult medical examination without abnormal findings 11/24/2021    Preventative health care    Encounter for immunization 01/12/2021    Encounter for immunization    Estrogen receptor positive 07/25/2024    Hyperlipidemia     Hypertension 07/21/2022    Hypertension Jan 2021    Hypothyroid 08/07/2020    IBS (irritable bowel syndrome)     IGT (impaired glucose tolerance)     Intraductal carcinoma in situ of right breast 07/25/2024    Lesion of sciatic nerve, left lower limb 08/18/2021    Piriformis syndrome of left side    Low back pain 8/2020    Menstrual migraine, not intractable, without status migrainosus 08/07/2020    Menstrual migraine without status migrainosus, not intractable    Other hypersomnia 08/07/2020    Daytime hypersomnolence    Other psychoactive substance use, 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 08/07/2020    Drug-induced insomnia    Paget's disease of the breast, right 07/25/2024    Polyarthralgia     PONV (postoperative nausea and vomiting) 2005    S/P breast reconstruction, bilateral 07/25/2024    Stage 1        Past Surgical History:   Procedure Laterality Date    BREAST BIOPSY      BREAST RECONSTRUCTION Bilateral 11/12/2024    BREAST SURGERY Bilateral 7/2024    REDUCTION  MAMMOPLASTY    COLONOSCOPY      MASTECTOMY COMPLETE / SIMPLE Bilateral 07/25/2024    Barbara Campos MD;  Location: [facility];  Service: General Surgery;    RECTAL EXAMINATION UNDER ANESTHESIA  10/20/2022    SENTINEL LYMPH NODE BIOPSY Right 07/25/2024    TONSILLECTOMY Bilateral     WISDOM TOOTH EXTRACTION          Family History   Problem Relation Name Age of Onset    Colon cancer Father Mc     Hypertension Father Mc     Colon polyps Father Mc     Cancer Father Mc     Cancer Paternal Grandmother Delaney         No Known Allergies     MR lumbar spine wo IV contrast 11/27/2024    Narrative  Interpreted By:  Riley Crane,  STUDY:  MR LUMBAR SPINE WO IV CONTRAST;  11/27/2024 4:04 pm    INDICATION:  Signs/Symptoms:lumbar radiculopathy.    COMPARISON:  None.    ACCESSION NUMBER(S):  [id number]    ORDERING CLINICIAN:  WINNIE ORTEGA    TECHNIQUE:  Sagittal STIR, sagittal T2, sagittal T1, axial T2, axial T1 weighted  MRI images of the lumbar spine were obtained without intravenous  contrast administration.    FINDINGS:  There is a transitional lumbosacral vertebral body which for the sake  of this dictation will be labeled S1.    There is a minimal retrolisthesis of L5 on S1.    There are mild degenerative signal changes noted along endplates  within lumbar region.    There is a 22 mm hemangioma noted within the L3 vertebral body. There  is a 15 mm hemangioma within the S2 vertebral body. There are  additional smaller scattered subcentimeter hemangiomas and/or focal  fatty rests.    The visualized spinal cord demonstrates no signal abnormality within  it. Utilizing the above numbering scheme, the conus medullaris  terminates at the upper L2 level.    There is diminished disc signal at the L4/5 and L5/S1 levels  compatible with degenerative disc disease.    At the L5/S1 level,  there is minimal retrolisthesis of L5 on S1,  mild posterior osteophytic spurring and a posterior disc bulge  asymmetrically more  pronounced to the left of midline along with mild  degenerative facet changes. There is encroachment upon the lateral  recesses left greater than right without significant narrowing of the  thecal sac within the spinal canal. There is moderate encroachment  upon the neural foramen bilaterally.    At the L4/L5 level,  there is a small focus of bright signal on the  STIR and T2 weighted images noted along the posterior margin of the  disc compatible with an annular tear. There is a mild posterior disc  bulge slightly more pronounced to the left of midline along with  degenerative facet changes and mild ligamentum flavum hypertrophy  contributing to mild spinal canal narrowing. There is mild  encroachment upon the neural foramen bilaterally.    At the L3/L4 level,  there is no significant spinal canal stenosis or  neuroforaminal stenosis.    At the L2/L3 level,  there is no significant spinal canal stenosis or  neuroforaminal stenosis.    At the L1/L2 level,  there is no significant spinal canal stenosis or  neuroforaminal stenosis.    At the T12/L1 level,  there is no significant spinal canal stenosis  or neuroforaminal stenosis.    Impression  There is a transitional lumbosacral vertebral body which for the sake  of this dictation will be labeled S1.    There is a minimal retrolisthesis of L5 on S1.    There is multilevel spondylosis with varying degrees of spinal canal  and neural foraminal narrowing as described above.    MACRO:  None.    Signed by: Riley Crane 11/29/2024 2:29 PM  Dictation workstation:   [id number]      Objective     Vitals:    03/12/25 0955   BP: (!) 127/92   Pulse: 86   Resp: 16   SpO2: 98%               Physical Exam    GENERAL EXAM  Vital Signs: Vital signs to include heart rate, respiration rate, blood pressure, and temperature were reviewed.  General Appearance:  Awake, alert, healthy appearing, well developed, No acute distress.  Head: Normocephalic without evidence of head injury.  Neck:  The appearance of the neck was normal without swelling with a midline trachea.  Eyes: The eyelids and eyebrows exhibited no abnormalities.  Pupils were not pin-point.  Sclera was without icterus.  Lungs: Respiration rhythm and depth was normal.  Respiratory movements were normal without labored breathing.  Cardiovascular: No peripheral edema was present.    Spine, lumbar: Patient is able to rise from seated to standing position without hesitancy, push off or delay.  Gait is nonantalgic.  Flexion and extension intact.  No tenderness to paraspinous musculature lower lumbar region.  Negative for tenderness with palpation over coccyx.  Neurological: Patient was oriented to time, place, and person.  Speech was normal.  Balance, gait, and stance were unremarkable.    Psychiatric: Appearance was normal with appropriate dress.  Mood was euthymic and affect was normal.  Skin: Affected regions were without ecchymosis or skin lesions.          Assessment/Plan   Problem List Items Addressed This Visit             ICD-10-CM    Coccydynia - Primary M53.3     39-year-old female presents for follow-up of coccyx pain/coccydynia.  Patient noted improvement in coccyx pain after removal of a neuroma in 2023.  She continued to experience a lesser level of coccyx pain after her neuroma removed, however, documenting that pain remained constant and interfering with quality of life.  She has done pelvic floor therapy with internal manipulation twice and continues home therapy exercises and stretches consistently.  Due to failure to receive relief with conservative measures such as physical therapy, dry needling and chiropractic visits, the patient was scheduled for a ganglion impar block to treat coccyx pain.  Also discussed with patient that if this does not provide long-term relief, could consider radiofrequency ablation of the area. Could also explore an atypical presentation of referred back pain.  MRI consistent with annular fissure at  the L4-5 level.  There is also neuroforaminal stenosis at L4-5, L5-S1.  Could consider lumbar transforaminal ANNELIESE's in the future if symptoms more consistent with lumbar bridget.  Manages pain with Tylenol and ibuprofen.  Not interested in additional medication.  Discussed that she may benefit from Topamax in the future if needed. She would like to avoid additional medications.  Presenting at today's office visit for follow-up after ganglion impar block on 1/24/2025.  Patient endorsing greater than 98% relief of coccyx pain with this injection.  Rating her pain 0 out of 10.  She has been able to resume normal activity including increasing her exercise regimen secondary to improvement in symptoms.  She is able to sit longer and reclining on her back with no pain.  Advised patient that she may repeat her ganglion impar block every 3 to 4 months as needed.  Recommended patient  continue home physical therapy exercises and stretches targeting her core strength as well as gluteal muscles.  Advised patient that she may follow-up in our office as needed.      -Advised patient that she may contact our office should symptoms return and she would like to repeat her ganglion impar block under fluoroscopy.    -Continue PT directed exercises at home  -Follow-up with our office as needed.                   This note was generated with the aid of dictation software, there may be typos despite my attempts at proofreading.

## 2025-03-12 NOTE — ASSESSMENT & PLAN NOTE
39-year-old female presents for follow-up of coccyx pain/coccydynia.  Patient noted improvement in coccyx pain after removal of a neuroma in 2023.  She continued to experience a lesser level of coccyx pain after her neuroma removed, however, documenting that pain remained constant and interfering with quality of life.  She has done pelvic floor therapy with internal manipulation twice and continues home therapy exercises and stretches consistently.  Due to failure to receive relief with conservative measures such as physical therapy, dry needling and chiropractic visits, the patient was scheduled for a ganglion impar block to treat coccyx pain.  Also discussed with patient that if this does not provide long-term relief, could consider radiofrequency ablation of the area. Could also explore an atypical presentation of referred back pain.  MRI consistent with annular fissure at the L4-5 level.  There is also neuroforaminal stenosis at L4-5, L5-S1.  Could consider lumbar transforaminal ANNELIESE's in the future if symptoms more consistent with lumbar bridegt.  Manages pain with Tylenol and ibuprofen.  Not interested in additional medication.  Discussed that she may benefit from Topamax in the future if needed. She would like to avoid additional medications.  Presenting at today's office visit for follow-up after ganglion impar block on 1/24/2025.  Patient endorsing greater than 98% relief of coccyx pain with this injection.  Rating her pain 0 out of 10.  She has been able to resume normal activity including increasing her exercise regimen secondary to improvement in symptoms.  She is able to sit longer and reclining on her back with no pain.  Advised patient that she may repeat her ganglion impar block every 3 to 4 months as needed.  Recommended patient  continue home physical therapy exercises and stretches targeting her core strength as well as gluteal muscles.  Advised patient that she may follow-up in our office as needed.       -Advised patient that she may contact our office should symptoms return and she would like to repeat her ganglion impar block under fluoroscopy.    -Continue PT directed exercises at home  -Follow-up with our office as needed.

## 2025-03-13 ENCOUNTER — APPOINTMENT (OUTPATIENT)
Dept: RHEUMATOLOGY | Facility: CLINIC | Age: 40
End: 2025-03-13
Payer: COMMERCIAL

## 2025-03-20 ENCOUNTER — TELEPHONE (OUTPATIENT)
Dept: RADIOLOGY | Facility: HOSPITAL | Age: 40
End: 2025-03-20
Payer: COMMERCIAL

## 2025-03-20 NOTE — TELEPHONE ENCOUNTER
"This RN Breast Care Coordinator contacted patient for outreach to identify barriers, needs, and offer support. Reintroduced myself to patient and role, advised patient wanted to see how she was doing and if she needed anything at this time. Patient reports she has had her 6m follow up visits and was just released by Plastic Surgery, will follow up as needed. Plans to follow up with menopause specialist regarding fatigue ect. Patient states she is working on \"getting back to normal\". Provided therapeutic listening and emotional support. Patient denied further needs or support at this time. Encouraged patient to call with additional questions, concerns, or support needs before concluding our call.  "

## 2025-04-15 ASSESSMENT — RHEUMATOLOGY NEW PATIENT QUESTIONNAIRE
VOMITING OF BLOOD OR COFFEE GROUND CONSISTENCY MATERIAL: N
AGITATION: N
LOSS OF VISION: N
NAUSEA: N
DIFFICULTY SWALLOWING: N
JOINT SWELLING: N
NODULES/BUMPS: N
PAIN OR BURNING ON URINATION: N
COLOR CHANGES OF HANDS OR FEET IN THE COLD: N
SORES IN MOUTH OR NOSE: N
DIFFICULTY STAYING ASLEEP: N
ABNORMAL URINE: N
ANXIETY: N
JAUNDICE: N
VAGINAL DRYNESS: N
DOUBLE OR BLURRED VISION: N
UNUSUAL FATIGUE: Y
SEIZURES: N
UNEXPLAINED HEARING LOSS: N
SKIN TIGHTNESS: N
SWOLLEN LEGS OR FEET: N
RASH OR ULCERS: N
NUMBNESS OR TINGLING IN HANDS OR FEET: N
FAINTING: N
HEARTBURN OR REFLUX: N
SKIN REDNESS: N
LOSS OF CONSCIOUSNESS: N
UNUSUAL BLEEDING: N
RASH: N
MEMORY LOSS: N
EXCESSIVE HAIR LOSS (MORE THAN YOUR NORM): N
BEHAVIORAL CHANGES: N
MUSCLE WEAKNESS: N
UNEXPLAINED WEIGHT CHANGE: N
EYE PAIN: N
DEPRESSION: N
SWOLLEN OR TENDER GLANDS: N
JOINT PAIN: Y
MORNING STIFFNESS IN LOWER BACK: N
NIGHT SWEATS: Y
HOW WOULD YOU DESCRIBE YOUR STIFFNESS ON AVERAGE: MILD
STOMACH PAIN: N
HEADACHES: N
EASILY LOSING TEMPER: N
SHORTNESS OF BREATH: N
DRYNESS OF MOUTH: Y
BLOOD IN STOOLS: N
DIFFICULTY BREATHING LYING DOWN: N
SUN SENSITIVE (SUN ALLERGY): N
MORNING STIFFNESS: Y
EYE DRYNESS: N
INCREASED SUSCEPTIBILITY TO INFECTION: N
EASY BRUISING: N
UNUSUALLY RAPID OR SLOWED HEART RATE: N
EYE REDNESS: N
FEVER: N
DIFFICULTY FALLING ASLEEP: N
HOARSE VOICE: N
ANEMIA: N
COUGH: N
PERSISTENT DIARRHEA: N
CHEST PAIN: N
BLACK STOOLS: N

## 2025-04-22 ENCOUNTER — APPOINTMENT (OUTPATIENT)
Dept: RHEUMATOLOGY | Facility: CLINIC | Age: 40
End: 2025-04-22
Payer: COMMERCIAL

## 2025-04-22 VITALS
SYSTOLIC BLOOD PRESSURE: 140 MMHG | HEART RATE: 99 BPM | BODY MASS INDEX: 39.95 KG/M2 | WEIGHT: 234 LBS | HEIGHT: 64 IN | DIASTOLIC BLOOD PRESSURE: 80 MMHG | OXYGEN SATURATION: 97 %

## 2025-04-22 DIAGNOSIS — R76.8 ANA POSITIVE: Primary | ICD-10-CM

## 2025-04-22 DIAGNOSIS — M77.01 MEDIAL EPICONDYLITIS OF RIGHT ELBOW: ICD-10-CM

## 2025-04-22 PROBLEM — M46.1 BILATERAL SACROILIITIS: Status: RESOLVED | Noted: 2024-07-22 | Resolved: 2025-04-22

## 2025-04-22 PROCEDURE — 3079F DIAST BP 80-89 MM HG: CPT | Performed by: INTERNAL MEDICINE

## 2025-04-22 PROCEDURE — 3008F BODY MASS INDEX DOCD: CPT | Performed by: INTERNAL MEDICINE

## 2025-04-22 PROCEDURE — 99204 OFFICE O/P NEW MOD 45 MIN: CPT | Performed by: INTERNAL MEDICINE

## 2025-04-22 PROCEDURE — 3077F SYST BP >= 140 MM HG: CPT | Performed by: INTERNAL MEDICINE

## 2025-04-22 PROCEDURE — 1036F TOBACCO NON-USER: CPT | Performed by: INTERNAL MEDICINE

## 2025-04-22 RX ORDER — SPIRONOLACTONE 50 MG/1
1 TABLET, FILM COATED ORAL
COMMUNITY
Start: 2025-04-04

## 2025-04-22 ASSESSMENT — PAIN SCALES - GENERAL: PAINLEVEL_OUTOF10: 3

## 2025-04-22 NOTE — PROGRESS NOTES
Initial Rheumatology Patient Visit    Chief Complaint:  Elizabeth Goss is a 39 y.o. female presenting today for New Patient Visit.    History of Presenting Problem:   39 y.o. female with Hx of right breast cancer s/p Double mastectomy present for positive ELIA and other concerns. She report she has been getting occasional joint pan in her right elbow that radiates in to her hands/thumb since October 2024. She has tried NSAIDs like ibuprofen 800 mg  as needed which helps about 50%.   She report neuroma of tailbone since August of 202 that cause tailbone pain 0, s/p surgery in 2024. She recall she had a mole removed in 2005 in that cause a scar tissue. That resulted into the neuroma.     Problem List:   Patient Active Problem List   Diagnosis    Diastasis of rectus abdominis    Dyslipidemia    Essential hypertension    Female pelvic pain    Gluteal pain    Coccydynia    Irritable bowel syndrome with both constipation and diarrhea    Menstrual migraine without status migrainosus, not intractable    Metabolic syndrome X    Primary hypothyroidism    Menopausal vasomotor syndrome    Class 2 obesity due to excess calories without serious comorbidity with body mass index (BMI) of 39.0 to 39.9 in adult    IGT (impaired glucose tolerance)    Ductal carcinoma in situ (DCIS) of right breast    BRCA negative    Situational mixed anxiety and depressive disorder    S/P breast reconstruction, bilateral    Medial epicondylitis of both elbows    Polyarthralgia    Paget's disease of the breast, right       Past Medical History: Medical History[1]    Surgical History: Surgical History[2]     Allergies: No Known Allergies    Medications:   Current Outpatient Medications:     b complex 0.4 mg tablet, Take 1 tablet by mouth once daily., Disp: , Rfl:     buPROPion XL (Wellbutrin XL) 150 mg 24 hr tablet, Take 1 tablet (150 mg) by mouth once daily in the morning. Do not crush, chew, or split., Disp: 90 tablet, Rfl: 3    cholecalciferol  "(Vitamin D-3) 25 MCG (1000 UT) capsule, Take 1 capsule (25 mcg) by mouth once daily., Disp: , Rfl:     cloNIDine (Catapres) 0.1 mg tablet, TAKE 1 TABLET TWICE A DAY, Disp: 180 tablet, Rfl: 3    drospirenone, contraceptive, (Slynd) 4 mg (28) tablet, Take 1 tablet by mouth once daily., Disp: 90 tablet, Rfl: 3    fluticasone (Flonase) 50 mcg/actuation nasal spray, Administer 1 spray into affected nostril(s) once daily., Disp: , Rfl:     PARoxetine (PaxiL) 10 mg tablet, Take 1 tablet (10 mg) by mouth once daily at bedtime. Take one tablet by mouth at the bedtime, Disp: 90 tablet, Rfl: 3    spironolactone (Aldactone) 50 mg tablet, Take 1 tablet (50 mg) by mouth early in the morning.., Disp: , Rfl:     Synthroid 75 mcg tablet, TAKE 1 TABLET ONCE DAILY, Disp: 90 tablet, Rfl: 3    Review of Systems:   ROS: Denies Morning stiffness, she report occasional swelling in her hands/ankle joint swelling, that improved with her morning work out Denies dry eyes, she does report dry mouth, Denies  oral, nasal or genital ulcers, Denies sun sensitivity expect with medication, Raynaud's phenomenon. Denies Uveitis or recent vision changes. Denies new rashes or Hx of Psoriasis, Denies alopecia,   Denies Chest pain/SOB, cough, Hx of asthma, Denies Fever/chills/sweats, She report Fatigue related to medication, she typically takes nap, Denies weight loss  Denies abdominal pain, New onset Dyspepsia, Denies dysphasia, Denies nausea/vomiting/diarrhea, dark/tarry stools, Denies blood in stools or urine. Denies Chronic back pain.   Denies Hx of blood clot or miscarriages. Hx of easy bruising or bleeding. Denies Headaches, Denies numbness and tingling, weakness.  All other systems have been reviewed and are negative for complaint.   Mother with Hashimotos    Objective   Physical Examination:    Visit Vitals  /80   Pulse 99   Ht 1.626 m (5' 4\")   Wt 106 kg (234 lb)   SpO2 97%   BMI 40.17 kg/m²   OB Status Having periods   Smoking Status Never "   BSA 2.19 m²        Gen: NAD, A&O x 3  HEENT: clear sclera and conjunctiva,     Musculoskeletal:   Neck; WNL, full ROM  Shoulder: WNL, full ROM  Elbow:WNL, full ROM, no effusion noted  Wrist and fingers;no active synovitis noted, Full ROM in the Wrist , Good fist and   Knees:  No effusions or crepitation, full ROM.  Hips; WNL, full ROM, Negative Krishan test  Ankle, Feet; WNL, full ROM    Skin: No rashes or lesions seen, no nail changes  Neuro: A&O x3, Normal Gait    Procedures :None    Orders:  Orders Placed This Encounter   Procedures    Thyroid Peroxidase (TPO) Antibody    Anti-Thyroglobulin Antibody    C3 Complement    C4 Complement    Uric Acid        Provider Impression:   Assessment/Plan   Encounter Diagnoses   Name Primary?    Medial epicondylitis of right elbow     ELIA positive Yes      39 y.o. female with Hx of right breast cancer s/p Double mastectomy present for positive ELIA and other concerns.  She has been dealing with medial epicondylitis of the right elbow.  She is currently working with chiropractor and this seems to be helping.  History of positive ELIA in the past with negative ERICA.  History of neuroma of this sacrum which was taking care of by plastic surgery with resection which resolved sacroiliac pain MRI did not show any active sacroiliitis  -Obtain additional serologies continue with chiropractic management recommended Voltaren gel as needed patient might need to see orthopedics if issue persists  -Recommend Voltaren gel at least twice a day to the elbow  -Follow-up based on lab results       [1]   Past Medical History:  Diagnosis Date    Allergic     Anxiety     Body mass index (BMI) 36.0-36.9, adult 01/06/2022    Body mass index (BMI) 39.0-39.9, adult 10/28/2024    Depression     Disease of thyroid gland 2014    Encounter for general adult medical examination without abnormal findings 07/21/2022    Preventative health care    Encounter for general adult medical examination without  abnormal findings 11/24/2021    Preventative health care    Encounter for immunization 01/12/2021    Encounter for immunization    Estrogen receptor positive 07/25/2024    Hyperlipidemia     Hypertension 07/21/2022    Hypertension Jan 2021    Hypothyroid 08/07/2020    IBS (irritable bowel syndrome)     IGT (impaired glucose tolerance)     Intraductal carcinoma in situ of right breast 07/25/2024    Lesion of sciatic nerve, left lower limb 08/18/2021    Piriformis syndrome of left side    Low back pain 8/2020    Menstrual migraine, not intractable, without status migrainosus 08/07/2020    Menstrual migraine without status migrainosus, not intractable    Other hypersomnia 08/07/2020    Daytime hypersomnolence    Other psychoactive substance use, 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 08/07/2020    Drug-induced insomnia    Paget's disease of the breast, right 07/25/2024    Polyarthralgia     PONV (postoperative nausea and vomiting) 2005    S/P breast reconstruction, bilateral 07/25/2024    Stage 1   [2]   Past Surgical History:  Procedure Laterality Date    BREAST BIOPSY      BREAST RECONSTRUCTION Bilateral 11/12/2024    BREAST SURGERY Bilateral 7/2024    REDUCTION MAMMOPLASTY    COLONOSCOPY      MASTECTOMY COMPLETE / SIMPLE Bilateral 07/25/2024    Barbara Campos MD;  Location: [facility] OR;  Service: General Surgery;    RECTAL EXAMINATION UNDER ANESTHESIA  10/20/2022    SENTINEL LYMPH NODE BIOPSY Right 07/25/2024    TONSILLECTOMY Bilateral     WISDOM TOOTH EXTRACTION

## 2025-04-26 LAB
C3 SERPL-MCNC: NORMAL MG/DL
C4 SERPL-MCNC: NORMAL MG/DL
THYROGLOB AB SERPL-ACNC: NORMAL [IU]/ML
THYROPEROXIDASE AB SERPL-ACNC: NORMAL [IU]/ML
URATE SERPL-MCNC: 6.5 MG/DL (ref 2.5–7)

## 2025-04-28 DIAGNOSIS — M77.01 MEDIAL EPICONDYLITIS OF RIGHT ELBOW: Primary | ICD-10-CM

## 2025-04-28 LAB
C3 SERPL-MCNC: 176 MG/DL (ref 83–193)
C4 SERPL-MCNC: 35 MG/DL (ref 15–57)
THYROGLOB AB SERPL-ACNC: <1 IU/ML
THYROPEROXIDASE AB SERPL-ACNC: 1 IU/ML
URATE SERPL-MCNC: 6.5 MG/DL (ref 2.5–7)

## 2025-05-16 ENCOUNTER — TELEPHONE (OUTPATIENT)
Dept: PRIMARY CARE | Facility: CLINIC | Age: 40
End: 2025-05-16
Payer: COMMERCIAL

## 2025-05-16 NOTE — TELEPHONE ENCOUNTER
Patient had a mastectomy in July and thinks this right elbow pain is from that. She said it feels a lot like tennis elbow. Pain is right by the join and goes down to her wrist. She has been taking Aleve and pain cream 2x a day.  Please advise  [facility] in [place]

## 2025-06-30 ENCOUNTER — PREP FOR PROCEDURE (OUTPATIENT)
Dept: PAIN MEDICINE | Facility: HOSPITAL | Age: 40
End: 2025-06-30
Payer: COMMERCIAL

## 2025-06-30 DIAGNOSIS — M53.3 COCCYDYNIA: Primary | ICD-10-CM

## 2025-06-30 RX ORDER — DIAZEPAM 5 MG/1
5 TABLET ORAL ONCE AS NEEDED
OUTPATIENT
Start: 2025-06-30

## 2025-07-01 ENCOUNTER — TELEPHONE (OUTPATIENT)
Dept: PAIN MEDICINE | Facility: HOSPITAL | Age: 40
End: 2025-07-01
Payer: COMMERCIAL

## 2025-07-01 NOTE — TELEPHONE ENCOUNTER
GIB injection teaching instructions discussed via telephone with patient. Denies allergy contraindications. See education note. Mailed copy to patient. Patient verbalized understanding and all questions answered.

## 2025-07-02 ENCOUNTER — PREP FOR PROCEDURE (OUTPATIENT)
Dept: PAIN MEDICINE | Facility: HOSPITAL | Age: 40
End: 2025-07-02
Payer: COMMERCIAL

## 2025-07-02 ENCOUNTER — PATIENT MESSAGE (OUTPATIENT)
Dept: PRIMARY CARE | Facility: CLINIC | Age: 40
End: 2025-07-02
Payer: COMMERCIAL

## 2025-07-02 ENCOUNTER — TELEPHONE (OUTPATIENT)
Dept: PRIMARY CARE | Facility: CLINIC | Age: 40
End: 2025-07-02
Payer: COMMERCIAL

## 2025-07-02 DIAGNOSIS — E66.09 CLASS 2 OBESITY DUE TO EXCESS CALORIES WITHOUT SERIOUS COMORBIDITY WITH BODY MASS INDEX (BMI) OF 39.0 TO 39.9 IN ADULT: Primary | ICD-10-CM

## 2025-07-02 DIAGNOSIS — E66.812 CLASS 2 OBESITY DUE TO EXCESS CALORIES WITHOUT SERIOUS COMORBIDITY WITH BODY MASS INDEX (BMI) OF 39.0 TO 39.9 IN ADULT: Primary | ICD-10-CM

## 2025-07-02 DIAGNOSIS — M53.3 COCCYDYNIA: Primary | ICD-10-CM

## 2025-07-02 RX ORDER — DIAZEPAM 5 MG/1
5 TABLET ORAL ONCE AS NEEDED
OUTPATIENT
Start: 2025-07-02

## 2025-07-15 ENCOUNTER — APPOINTMENT (OUTPATIENT)
Dept: OBSTETRICS AND GYNECOLOGY | Facility: CLINIC | Age: 40
End: 2025-07-15
Payer: COMMERCIAL

## 2025-07-15 VITALS
DIASTOLIC BLOOD PRESSURE: 76 MMHG | HEIGHT: 64 IN | BODY MASS INDEX: 41.15 KG/M2 | WEIGHT: 241 LBS | SYSTOLIC BLOOD PRESSURE: 124 MMHG

## 2025-07-15 DIAGNOSIS — N95.8 GENITOURINARY SYNDROME OF MENOPAUSE: ICD-10-CM

## 2025-07-15 DIAGNOSIS — Z01.419 WELL WOMAN EXAM WITH ROUTINE GYNECOLOGICAL EXAM: ICD-10-CM

## 2025-07-15 PROCEDURE — 99396 PREV VISIT EST AGE 40-64: CPT | Performed by: NURSE PRACTITIONER

## 2025-07-15 PROCEDURE — 99214 OFFICE O/P EST MOD 30 MIN: CPT | Performed by: NURSE PRACTITIONER

## 2025-07-15 PROCEDURE — 3078F DIAST BP <80 MM HG: CPT | Performed by: NURSE PRACTITIONER

## 2025-07-15 PROCEDURE — 1036F TOBACCO NON-USER: CPT | Performed by: NURSE PRACTITIONER

## 2025-07-15 PROCEDURE — 3008F BODY MASS INDEX DOCD: CPT | Performed by: NURSE PRACTITIONER

## 2025-07-15 PROCEDURE — 3074F SYST BP LT 130 MM HG: CPT | Performed by: NURSE PRACTITIONER

## 2025-07-15 RX ORDER — ESTRADIOL 10 UG/1
TABLET, FILM COATED VAGINAL
Qty: 38 TABLET | Refills: 3 | Status: SHIPPED | OUTPATIENT
Start: 2025-07-15

## 2025-07-15 ASSESSMENT — PATIENT HEALTH QUESTIONNAIRE - PHQ9
2. FEELING DOWN, DEPRESSED OR HOPELESS: NOT AT ALL
SUM OF ALL RESPONSES TO PHQ9 QUESTIONS 1 & 2: 0
1. LITTLE INTEREST OR PLEASURE IN DOING THINGS: NOT AT ALL

## 2025-07-15 ASSESSMENT — ENCOUNTER SYMPTOMS
CONSTITUTIONAL NEGATIVE: 0
GASTROINTESTINAL NEGATIVE: 0
RESPIRATORY NEGATIVE: 0
ALLERGIC/IMMUNOLOGIC NEGATIVE: 0
NEUROLOGICAL NEGATIVE: 0
MUSCULOSKELETAL NEGATIVE: 0
PSYCHIATRIC NEGATIVE: 0
ENDOCRINE NEGATIVE: 0
CARDIOVASCULAR NEGATIVE: 0
EYES NEGATIVE: 0
HEMATOLOGIC/LYMPHATIC NEGATIVE: 0

## 2025-07-15 ASSESSMENT — PAIN SCALES - GENERAL: PAINLEVEL_OUTOF10: 0-NO PAIN

## 2025-07-15 NOTE — PROGRESS NOTES
"Subjective   Patient ID: Elizabeth Goss is a 39 y.o. female who presents for Annual Exam (Pap 2022).  HPI   2021  had a vasectomy; she stopped her POP  Soon after she developed mood swings, VMS, heart palpitations  AUB-heavy  2/2022: started on progesterone until fall of 2022; menses stopped  Weight gain, previously was on weight loss medications: Trulicity until 2023  Started OCP end of 2023  Continued to have VMS and heart palpitations  H/o hypothyroidism     Diagnosed with breast cancer 2024  Stage 0, ductal   Had 3 lymph nodes removed right axilla, bilateral mastectomy July 2024  Going through genetic testing and other testing to determine a plan of care - negative 2024 Nov 2024 implants and getting second opinion for breast reconstruction      Menopausal age: perimenopausal      contraception:  OCP, vasectomy  Previously had Mirena IUD and she did not like it  Likes Slynd, wants to discuss medication for perimenopause symptoms - continues to have symptoms with Paxil. Concerned about weight gain. Gained 20-25 lbs since May 2024.   Also taking clonidine and Wellbutrin.  use of OTC remedies: none  bioidenticals: none     Vaginal bleeding:  not having regular period on Slynd  VMS: occasional   Sleep difficulties: improved  Mood changes: occasional, feels \"easily emotional\"  Joint pain: occasional   Brain fog/difficulty concentrating: yes     GSM: yes-dryness  are you sexually active: yes, not as often d/t being uncomfortable and decreased desire  dyspareunia: yes  Uses water based lubricate but has irritation with use  decrease in desire: yes  difficulty reaching orgasm: yes  Urinary Incontinence: yes, occasional stress       Fractures: none  H/O abnormal pap: yes, 2009 colposcopy  Pap 2025 was normal with no abnormalities noted. Completed at outside private clinic June 2025.    Colon CA: father, maternal grandmother had polyps  Colonoscopy in Dec 2024 - Normal     Review of Systems    Objective "   Physical Exam  Genitourinary:         Comments: GSM noted, 90% reabsorption of the labia minora; no evidence of a vulvar dermatoses or lesion  No evidence of vulvodynia or pain with palpation of the pelvic floor  Skin bridge noted in the above marked area, will consider referral for surgical consult         Assessment/Plan   Diagnoses and all orders for this visit:  Well woman exam with routine gynecological exam  Genitourinary syndrome of menopause  -     estradiol (Vagifem) 10 mcg tablet vaginal tablet; Insert 1 tablet into vagina at bedtime for 2 weeks, then at bedtime twice a week.       Genitourinary syndrome of menopause is very common and undertreated in perimenopausal and menopausal women. It is due to the low levels of estrogen and sex steroids. Common symptoms include: vaginal dryness, pain with sex, frequent urinary tract infections, burning, itching, and irritation. Treatment options can include vaginal moisturizers and lubricants and prescription vaginal estrogen, intravaginal DHEA and oral Osphena. The prescription options have similar efficacy and low risk. However, there are black box warnings on the vaginal estrogen products and Osphena that are based off of studies of systemic HT in the [facility] study, not studies done on vaginal estrogen or Osphena.    The prescription options may take up to 12 weeks to alleviate symptoms and if discontinued, the symptoms will return.    Pt will start to wean off of paxil and most likely we will start Veozah, reyespon code given to pt  She will contact me with an update    KATY Aldridge-CNP 07/15/25 9:33 AM

## 2025-07-18 ENCOUNTER — HOSPITAL ENCOUNTER (OUTPATIENT)
Dept: GASTROENTEROLOGY | Facility: HOSPITAL | Age: 40
Discharge: HOME | End: 2025-07-18
Payer: COMMERCIAL

## 2025-07-18 ENCOUNTER — APPOINTMENT (OUTPATIENT)
Dept: GASTROENTEROLOGY | Facility: HOSPITAL | Age: 40
End: 2025-07-18
Payer: COMMERCIAL

## 2025-07-18 VITALS
TEMPERATURE: 97 F | RESPIRATION RATE: 18 BRPM | BODY MASS INDEX: 41.37 KG/M2 | DIASTOLIC BLOOD PRESSURE: 77 MMHG | WEIGHT: 241 LBS | SYSTOLIC BLOOD PRESSURE: 110 MMHG | OXYGEN SATURATION: 98 % | HEART RATE: 81 BPM

## 2025-07-18 DIAGNOSIS — M53.3 COCCYDYNIA: ICD-10-CM

## 2025-07-18 PROCEDURE — 64999 UNLISTED PX NERVOUS SYSTEM: CPT | Performed by: PHYSICAL MEDICINE & REHABILITATION

## 2025-07-18 PROCEDURE — 2500000002 HC RX 250 W HCPCS SELF ADMINISTERED DRUGS (ALT 637 FOR MEDICARE OP, ALT 636 FOR OP/ED): Performed by: CLINICAL NURSE SPECIALIST

## 2025-07-18 PROCEDURE — 2500000004 HC RX 250 GENERAL PHARMACY W/ HCPCS (ALT 636 FOR OP/ED): Mod: JW | Performed by: PHYSICAL MEDICINE & REHABILITATION

## 2025-07-18 PROCEDURE — 2550000001 HC RX 255 CONTRASTS: Mod: JW | Performed by: PHYSICAL MEDICINE & REHABILITATION

## 2025-07-18 PROCEDURE — 7100000010 HC PHASE TWO TIME - EACH INCREMENTAL 1 MINUTE

## 2025-07-18 PROCEDURE — 7100000009 HC PHASE TWO TIME - INITIAL BASE CHARGE

## 2025-07-18 RX ORDER — DIAZEPAM 5 MG/1
5 TABLET ORAL ONCE AS NEEDED
Status: COMPLETED | OUTPATIENT
Start: 2025-07-18 | End: 2025-07-18

## 2025-07-18 RX ORDER — DIAZEPAM 5 MG/1
5 TABLET ORAL ONCE AS NEEDED
Status: DISCONTINUED | OUTPATIENT
Start: 2025-07-18 | End: 2025-07-19 | Stop reason: HOSPADM

## 2025-07-18 RX ORDER — LIDOCAINE HYDROCHLORIDE 5 MG/ML
INJECTION, SOLUTION INFILTRATION; PERINEURAL AS NEEDED
Status: COMPLETED | OUTPATIENT
Start: 2025-07-18 | End: 2025-07-18

## 2025-07-18 RX ORDER — METHYLPREDNISOLONE ACETATE 40 MG/ML
INJECTION, SUSPENSION INTRA-ARTICULAR; INTRALESIONAL; INTRAMUSCULAR; SOFT TISSUE AS NEEDED
Status: COMPLETED | OUTPATIENT
Start: 2025-07-18 | End: 2025-07-18

## 2025-07-18 RX ORDER — ROPIVACAINE HYDROCHLORIDE 5 MG/ML
INJECTION, SOLUTION EPIDURAL; INFILTRATION; PERINEURAL AS NEEDED
Status: COMPLETED | OUTPATIENT
Start: 2025-07-18 | End: 2025-07-18

## 2025-07-18 RX ADMIN — ROPIVACAINE HYDROCHLORIDE 10 ML: 5 INJECTION, SOLUTION EPIDURAL; INFILTRATION; PERINEURAL at 11:41

## 2025-07-18 RX ADMIN — LIDOCAINE HYDROCHLORIDE 15 ML: 5 INJECTION, SOLUTION INFILTRATION; PERINEURAL at 11:40

## 2025-07-18 RX ADMIN — METHYLPREDNISOLONE ACETATE 40 MG: 40 INJECTION, SUSPENSION INTRA-ARTICULAR; INTRALESIONAL; INTRAMUSCULAR; INTRASYNOVIAL; SOFT TISSUE at 11:41

## 2025-07-18 RX ADMIN — IOHEXOL 5 ML: 350 INJECTION, SOLUTION INTRAVENOUS at 11:41

## 2025-07-18 RX ADMIN — DIAZEPAM 5 MG: 5 TABLET ORAL at 11:21

## 2025-07-18 ASSESSMENT — PAIN SCALES - GENERAL
PAINLEVEL_OUTOF10: 5 - MODERATE PAIN
PAINLEVEL_OUTOF10: 2

## 2025-07-18 ASSESSMENT — PAIN - FUNCTIONAL ASSESSMENT
PAIN_FUNCTIONAL_ASSESSMENT: 0-10
PAIN_FUNCTIONAL_ASSESSMENT: 0-10

## 2025-07-18 NOTE — DISCHARGE INSTRUCTIONS
DISCHARGE INSTRUCTIONS FOR INJECTIONS     After most injections, it is recommended that you relax and limit your activity for the remainder of the day unless you have been told otherwise by your pain physician.  You should not drive a car, operate machinery, or make important legal decisions unless otherwise directed by your pain physician.  You may resume your normal activity, including exercise, tomorrow.      Keep a written pain diary of how much pain relief you experienced following the injection procedure and the length of time of pain relief you experienced pain relief. Following diagnostic injections like medial branch nerve blocks, genicular nerve blocks, sacroiliac joint blocks, stellate ganglion injections and other blocks, it is very important you record the specific amount of pain relief you experienced immediately after the injection and how long it lasted. If you have been given Questionnaire paperwork to fill out out after your diagnostic block please call [phone] and leave a detailed voicemail with the answers to the questions you were given. This information is vital to getting approval for next steps.    Observe the needle site for excessive bleeding (slow general oozing that completely soaks the dressing or fresh bright red bleeding).  In either case, apply pressure to the area, elevate it if possible and call your doctor at once.      For all injections, please keep the injection site dry and inspect the site for a couple of days. You may remove the Band-Aid the day of the injection at any time.     Keep the needle site clean & dry for 24 hours.   no soaking baths, hot tubs, whirlpools or swimming pools for 2-3 days.     Some discomfort, bruising or slight swelling may occur at the injection site. This is not abnormal if it occurs.  If needed you may:    -Take over the counter medication such as Tylenol or Motrin.   -Apply an ice pack for 30 minutes, 2 to 3 times a day for the first 24  hours.     If you are given steroids in your injection, your pain may not be gone immediately after this procedure. It generally takes 3-5 days for the steroid to work but it can take up to 2 weeks. may You may notice a worsening of your symptoms for 1-2 days after the injection. This is not abnormal.  You may use acetaminophen, ibuprofen, or prescription medication that your doctor may have prescribed for you if you need to do so.     A few common side effects of steroids include facial flushing, sweating, restlessness, irritability,difficulty sleeping, increase in blood sugar, and increased blood pressure. If you have diabetes, please monitor your blood sugar at least once a day for at least 5 days. If you have poorly controlled high blood pressure, monitoryour blood pressure for at least 2 days and contact your primary care physician if these numbers are unusually high for you.        Call  Pain Management at [phone] between 8am-4pm Monday - Friday if you are experiencing the following:    If you received an epidural or spinal injection:    -Headache that does not go away with medicine, is worse when sitting or standing up, and is greatly relieved upon lying down.   -Severe pain worse than or different than your baseline pain.   -Chills or fever (101º F or greater).   -Drainage or signs of infection at the injection site     Go directly to the Emergency Department if you are experiencing the following and received an epidural or spinal injection:   -Abrupt weakness or progressive weakness in your legs that starts after you leave the clinic.   -Abrupt severe or worsening numbness in your legs.   -Inability to urinate after the injection or loss of bowel or bladder control without the urge to defecate or urinate.     If you have a clinical question that cannot wait until your next appointment, please call [phone] between 8am-4pm Monday - Friday. We do our best to return all non-emergency messages within  24-48 hours, Monday - Friday. A nurse or physician will return your message. You may also try calling and they will do their best to answer your question(s):    Erica Lao's nurse [phone]  [facility] nurse [phone]  After hours pain management [phone]    If you need to cancel an appointment, please call the scheduling staff at [phone] during normal business hours or leave a message at least 24 hours in advance.

## 2025-07-18 NOTE — H&P
Pain Management H&P    History Of Present Illness  Elizabeth Goss is a 39 y.o. female presents for procedure stated below. Endorses no changes in past medical history or medical health since last seen in clinic.      Past Medical History  She has a past medical history of Allergic, Anxiety, Body mass index (BMI) 36.0-36.9, adult (01/06/2022), Body mass index (BMI) 39.0-39.9, adult (10/28/2024), Depression, Disease of thyroid gland (2014), Encounter for general adult medical examination without abnormal findings (07/21/2022), Encounter for general adult medical examination without abnormal findings (11/24/2021), Encounter for immunization (01/12/2021), Estrogen receptor positive (07/25/2024), Hyperlipidemia, Hypertension (07/21/2022), Hypertension (Jan 2021), Hypothyroid (08/07/2020), Hypothyroidism, IBS (irritable bowel syndrome), IGT (impaired glucose tolerance), 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    Surgical History  She has a past surgical history that includes Breast surgery (Bilateral, 7/2024); Tonsillectomy (Bilateral); Mastectomy complete / simple (Bilateral, 07/25/2024); Breast biopsy (4/2024); [place] tooth extraction; Rectal examination under anesthesia (10/20/2022); [place] lymph node biopsy (Right, 07/25/2024); Colonoscopy; Breast reconstruction (Bilateral, 11/12/2024); Colposcopy (8/2012); Cervical biopsy w/ loop electrode excision (2011); and pr breast augmentation with implant (Nov 2024).     Social  History  She reports that she has never smoked. She has never used smokeless tobacco. She reports that she does not currently use alcohol. She reports that she does not use drugs.    Family History  Family History[1]     Allergies  Patient has no known allergies.    Review of Symptoms:   Constitutional: Negative for chills, diaphoresis or fever  HENT: Negative for neck swelling  Eyes:.  Negative for eye pain  Respiratory:.  Negative for cough, shortness of breath or wheezing    Cardiovascular:.  Negative for chest pain or palpitations  Gastrointestinal:.  Negative for abdominal pain, nausea and vomiting  Genitourinary:.  Negative for urgency  Musculoskeletal:  Positive for back pain. Positive for joint pain. Denies falls within the past 3 months.  Skin: Negative for wounds or itching   Neurological: Negative for dizziness, seizures, loss of consciousness and weakness  Endo/Heme/Allergies: Does not bruise/bleed easily  Psychiatric/Behavioral: Negative for depression. The patient does not appear anxious.      Pre-sedation Evaluation  ASA class 3  Mallampati score 2     PHYSICAL EXAM  Vitals signs reviewed  Constitutional:       General: Not in acute distress     Appearance: Normal appearance. Not ill-appearing.  HENT:     Head: Normocephalic and atraumatic  Eyes:     Conjunctiva/sclera: Conjunctivae normal  Cardiovascular:     Rate and Rhythm: Normal rate and regular rhythm  Pulmonary:     Effort: No respiratory distress  Abdominal:     Palpations: Abdomen is soft  Musculoskeletal: COLIN  Skin:     General: Skin is warm and dry  Neurological:     General: No focal deficit present  Psychiatric:         Mood and Affect: Mood normal         Behavior: Behavior normal     Last Recorded Vitals  There were no vitals taken for this visit.    Relevant Results  Current Outpatient Medications   Medication Instructions    b complex 0.4 mg tablet 1 tablet, Daily    buPROPion XL (WELLBUTRIN XL) 150 mg, oral, Every morning, Do not  crush, chew, or split.    cholecalciferol (VITAMIN D-3) 25 mcg, Daily    cloNIDine (CATAPRES) 0.1 mg, oral, 2 times daily    drospirenone, contraceptive, (Slynd) 4 mg (28) tablet 1 tablet, oral, Daily    estradiol (Vagifem) 10 mcg tablet vaginal tablet Insert 1 tablet into vagina at bedtime for 2 weeks, then at bedtime twice a week.    fluticasone (Flonase) 50 mcg/actuation nasal spray 1 spray, Daily    spironolactone (Aldactone) 50 mg tablet 1 tablet, Daily (0630)    Synthroid 75 mcg, oral, Daily    tirzepatide (weight loss) (ZEPBOUND) 5 mg, subcutaneous, Every 7 days         MR lumbar spine wo IV contrast 11/27/2024    Narrative  Interpreted By:  Riley Crane,  STUDY:  MR LUMBAR SPINE WO IV CONTRAST;  11/27/2024 4:04 pm    INDICATION:  Signs/Symptoms:lumbar radiculopathy.    COMPARISON:  None.    ACCESSION NUMBER(S):  [id number]    ORDERING CLINICIAN:  WINNIE ORTEGA    TECHNIQUE:  Sagittal STIR, sagittal T2, sagittal T1, axial T2, axial T1 weighted  MRI images of the lumbar spine were obtained without intravenous  contrast administration.    FINDINGS:  There is a transitional lumbosacral vertebral body which for the sake  of this dictation will be labeled S1.    There is a minimal retrolisthesis of L5 on S1.    There are mild degenerative signal changes noted along endplates  within lumbar region.    There is a 22 mm hemangioma noted within the L3 vertebral body. There  is a 15 mm hemangioma within the S2 vertebral body. There are  additional smaller scattered subcentimeter hemangiomas and/or focal  fatty rests.    The visualized spinal cord demonstrates no signal abnormality within  it. Utilizing the above numbering scheme, the conus medullaris  terminates at the upper L2 level.    There is diminished disc signal at the L4/5 and L5/S1 levels  compatible with degenerative disc disease.    At the L5/S1 level,  there is minimal retrolisthesis of L5 on S1,  mild posterior osteophytic spurring and a posterior  disc bulge  asymmetrically more pronounced to the left of midline along with mild  degenerative facet changes. There is encroachment upon the lateral  recesses left greater than right without significant narrowing of the  thecal sac within the spinal canal. There is moderate encroachment  upon the neural foramen bilaterally.    At the L4/L5 level,  there is a small focus of bright signal on the  STIR and T2 weighted images noted along the posterior margin of the  disc compatible with an annular tear. There is a mild posterior disc  bulge slightly more pronounced to the left of midline along with  degenerative facet changes and mild ligamentum flavum hypertrophy  contributing to mild spinal canal narrowing. There is mild  encroachment upon the neural foramen bilaterally.    At the L3/L4 level,  there is no significant spinal canal stenosis or  neuroforaminal stenosis.    At the L2/L3 level,  there is no significant spinal canal stenosis or  neuroforaminal stenosis.    At the L1/L2 level,  there is no significant spinal canal stenosis or  neuroforaminal stenosis.    At the T12/L1 level,  there is no significant spinal canal stenosis  or neuroforaminal stenosis.    Impression  There is a transitional lumbosacral vertebral body which for the sake  of this dictation will be labeled S1.    There is a minimal retrolisthesis of L5 on S1.    There is multilevel spondylosis with varying degrees of spinal canal  and neural foraminal narrowing as described above.    MACRO:  None.    Signed by: Riley Crane 11/29/2024 2:29 PM  Dictation workstation:   [id number]       ASSESSMENT/PLAN  Elizabeth Goss is a 39 y.o. female here for ganglion impar block.     Patient denies any recent antibiotic use or infections, denies any blood thinner use, and denies contrast or local anesthetic allergies     Risks, benefits, alternatives discussed. All questions answered to the best of my ability. Patient agrees to proceed.      Our plan is  as follows:  - Proceed with aforementioned procedure       Sachi Contreras MD  Anesthesiology PGY1       [1]   Family History  Problem Relation Name Age of Onset    Colon cancer Father Mc     Hypertension Father Mc     Colon polyps Father Mc     Cancer Father Mc     Cancer Paternal Grandmother Delaney     Cancer Paternal Grandmother Delaney     Hyperlipidemia Mother Razia shore     Asthma Mother Razia shore

## 2025-07-23 DIAGNOSIS — D05.11 DUCTAL CARCINOMA IN SITU (DCIS) OF RIGHT BREAST: ICD-10-CM

## 2025-07-23 DIAGNOSIS — N95.1 HOT FLASHES DUE TO MENOPAUSE: Primary | ICD-10-CM

## 2025-07-23 RX ORDER — FEZOLINETANT 45 MG/1
45 TABLET, FILM COATED ORAL DAILY
Qty: 30 TABLET | Refills: 11 | Status: SHIPPED | OUTPATIENT
Start: 2025-07-23

## 2025-07-30 ENCOUNTER — PATIENT MESSAGE (OUTPATIENT)
Dept: PRIMARY CARE | Facility: CLINIC | Age: 40
End: 2025-07-30
Payer: COMMERCIAL

## 2025-07-30 DIAGNOSIS — E66.812 CLASS 2 OBESITY DUE TO EXCESS CALORIES WITHOUT SERIOUS COMORBIDITY WITH BODY MASS INDEX (BMI) OF 39.0 TO 39.9 IN ADULT: Primary | ICD-10-CM

## 2025-07-30 DIAGNOSIS — E66.09 CLASS 2 OBESITY DUE TO EXCESS CALORIES WITHOUT SERIOUS COMORBIDITY WITH BODY MASS INDEX (BMI) OF 39.0 TO 39.9 IN ADULT: Primary | ICD-10-CM

## 2025-08-05 ASSESSMENT — PROMIS GLOBAL HEALTH SCALE
RATE_PHYSICAL_HEALTH: GOOD
EMOTIONAL_PROBLEMS: RARELY
CARRYOUT_PHYSICAL_ACTIVITIES: COMPLETELY
RATE_AVERAGE_PAIN: 2
RATE_SOCIAL_SATISFACTION: GOOD
RATE_QUALITY_OF_LIFE: GOOD
CARRYOUT_SOCIAL_ACTIVITIES: VERY GOOD
RATE_AVERAGE_FATIGUE: MILD
RATE_MENTAL_HEALTH: GOOD
RATE_GENERAL_HEALTH: GOOD

## 2025-08-06 ENCOUNTER — TELEPHONE (OUTPATIENT)
Dept: OBSTETRICS AND GYNECOLOGY | Facility: CLINIC | Age: 40
End: 2025-08-06

## 2025-08-06 ENCOUNTER — APPOINTMENT (OUTPATIENT)
Dept: SURGERY | Facility: CLINIC | Age: 40
End: 2025-08-06
Payer: COMMERCIAL

## 2025-08-06 VITALS
DIASTOLIC BLOOD PRESSURE: 80 MMHG | OXYGEN SATURATION: 97 % | WEIGHT: 237 LBS | SYSTOLIC BLOOD PRESSURE: 113 MMHG | HEART RATE: 85 BPM | BODY MASS INDEX: 40.46 KG/M2 | HEIGHT: 64 IN

## 2025-08-06 DIAGNOSIS — Z98.890 S/P BREAST RECONSTRUCTION, BILATERAL: ICD-10-CM

## 2025-08-06 DIAGNOSIS — D05.11 DUCTAL CARCINOMA IN SITU (DCIS) OF RIGHT BREAST: Primary | ICD-10-CM

## 2025-08-06 PROCEDURE — 3079F DIAST BP 80-89 MM HG: CPT | Performed by: SURGERY

## 2025-08-06 PROCEDURE — 3008F BODY MASS INDEX DOCD: CPT | Performed by: SURGERY

## 2025-08-06 PROCEDURE — 99213 OFFICE O/P EST LOW 20 MIN: CPT | Performed by: SURGERY

## 2025-08-06 PROCEDURE — 1036F TOBACCO NON-USER: CPT | Performed by: SURGERY

## 2025-08-06 PROCEDURE — 3074F SYST BP LT 130 MM HG: CPT | Performed by: SURGERY

## 2025-08-06 ASSESSMENT — ENCOUNTER SYMPTOMS
DIARRHEA: 0
MYALGIAS: 0
COUGH: 0
WOUND: 0
WEAKNESS: 0
ARTHRALGIAS: 0
AGITATION: 0
NAUSEA: 0
EYE REDNESS: 0
VOMITING: 0
FATIGUE: 0
CONFUSION: 0
DYSURIA: 0
SHORTNESS OF BREATH: 0
HEADACHES: 0
CHILLS: 0
ABDOMINAL PAIN: 0
HEMATURIA: 0
EYE PAIN: 0
POLYPHAGIA: 0
FREQUENCY: 0
CONSTIPATION: 0
FLANK PAIN: 0
FEVER: 0
SPEECH DIFFICULTY: 0
BRUISES/BLEEDS EASILY: 0

## 2025-08-06 NOTE — TELEPHONE ENCOUNTER
Pt verified by name and .  Nurse calling pt regarding her my chart message regarding Veozah.  Pt states Veozah gave her 30 days free sample and she can have one more.  Pt has appt in September with brice Enriquez.  Pt will call office is she needs a sample to get her to her appt.  Pt has no questions at this time.

## 2025-08-06 NOTE — PATIENT INSTRUCTIONS
1.  Monthly, continue doing your self breast/chest wall exams.  If you feel any abnormalities, please call Dr. Campos's office immediately.  [phone]  2.  Follow-up in Dr. Campos's office in 6 months for routine follow-up of your right breast cancer.  3.  A referral to Dr. Johnson (plastic surgery) has been made for discussion of your concerns with your reconstruction surgery.

## 2025-08-06 NOTE — PROGRESS NOTES
GENERAL SURGERY OFFICE NOTE    Patient: Elizabeth Goss    Age: 39 y.o.   Gender: female    MRN: [id number]    PCP: Abihlash Porter, DO        SUBJECTIVE     Chief Complaint  Follow-up (Patient is here for a 6 month right breast cancer follow up. Patient states that she has not had any problems or concerns. Patient states that she had a second opinion yesterday for her reconstruction surgery.)       CHRISTY Johnson returns to the office for a 1.5- year follow up s/p bilateral mastectomy with right SLNB and reconstructive surgery for right breast DCIS.  In the last 6 months, she has not noticed a new masses, skin changes or thickening.  She has no areas of concern from a cancer standpoint.  She has gained a small amount of weight since her last office visit.  Her biggest concern is dissatisfaction with her reconstructive surgery.  She feels that the implants are too wide apart and very flat.  She saw a plastic surgeon in [place], [place] who had recommended some weight loss and possible mammoplasty type surgery to help.  She continues to have a slight tightness across her upper chest area since the mastectomy surgery, and occasionally will get some pain underneath her axillary region when she tries to do lifting above her head.  This is not worsening since her last office visit.    Original risk factors for breast cancer: 38-year-old white female; menarche at age [age]; first live birth at age 32; no previous breast biopsy; no first-degree relative with breast cancer.  She is premenopausal.  Paternal great aunt had breast cancer.  No family history of ovarian cancer, pancreatic cancer or prostate cancer.  This gives her a 5-year Shahrzad score of 0.7% and a lifetime risk of 12.5% which puts her just above average risk category.    ROS  Review of Systems   Constitutional:  Negative for chills, fatigue and fever.   HENT:  Negative for congestion, ear pain and hearing loss.    Eyes:  Negative for pain and redness.    Respiratory:  Negative for cough and shortness of breath.    Cardiovascular:  Negative for chest pain and leg swelling.   Gastrointestinal:  Negative for abdominal pain, constipation, diarrhea, nausea and vomiting.   Endocrine: Negative for polyphagia.   Genitourinary:  Negative for dysuria, flank pain, frequency and hematuria.   Musculoskeletal:  Negative for arthralgias and myalgias.   Skin:  Negative for rash and wound.   Allergic/Immunologic: Negative for immunocompromised state.   Neurological:  Negative for speech difficulty, weakness and headaches.   Hematological:  Does not bruise/bleed easily.   Psychiatric/Behavioral:  Negative for agitation and confusion.           HISTORY     Past Medical History:   Diagnosis Date    Allergic     Anxiety     Body mass index (BMI) 36.0-36.9, adult 01/06/2022    Body mass index (BMI) 39.0-39.9, adult 10/28/2024    Depression     Disease of thyroid gland 2014    Encounter for general adult medical examination without abnormal findings 07/21/2022    Preventative health care    Encounter for general adult medical examination without abnormal findings 11/24/2021    Preventative health care    Encounter for immunization 01/12/2021    Encounter for immunization    Estrogen receptor positive 07/25/2024    Hyperlipidemia     Hypertension 07/21/2022    Hypertension Jan 2021    Hypothyroid 08/07/2020    Hypothyroidism     IBS (irritable bowel syndrome)     IGT (impaired glucose tolerance)     Intraductal carcinoma in situ of right breast 07/25/2024    Lesion of sciatic nerve, left lower limb 08/18/2021    Piriformis syndrome of left side    Low back pain 8/2020    Menstrual migraine, not intractable, without status migrainosus 08/07/2020    Menstrual migraine without status migrainosus, not intractable    Obesity     Other hypersomnia 08/07/2020    Daytime hypersomnolence    Other psychoactive substance use, 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 08/07/2020     Drug-induced insomnia    Paget's disease of the breast, right 07/25/2024    Polyarthralgia     PONV (postoperative nausea and vomiting) 2005    S/P breast reconstruction, bilateral 07/25/2024    Stage 1        Past Surgical History:   Procedure Laterality Date    BREAST BIOPSY  4/2024    BREAST RECONSTRUCTION Bilateral 11/12/2024    BREAST SURGERY Bilateral 7/2024    REDUCTION MAMMOPLASTY    CERVICAL BIOPSY  W/ LOOP ELECTRODE EXCISION  2011    COLONOSCOPY      COLPOSCOPY  8/2012    LASIK  5/2014    MASTECTOMY COMPLETE / SIMPLE Bilateral 07/25/2024    Barbara Campos MD;  Location: [facility];  Service: General Surgery;    OTHER SURGICAL HISTORY  08/07/2020    CT BREAST AUGMENTATION WITH IMPLANT  Nov 2024    RECTAL EXAMINATION UNDER ANESTHESIA  10/20/2022    REDUCTION MAMMAPLASTY  12/2004    SENTINEL LYMPH NODE BIOPSY Right 07/25/2024    TONSILLECTOMY Bilateral     WISDOM TOOTH EXTRACTION          Family History   Problem Relation Name Age of Onset    Colon cancer Father Mc     Hypertension Father Mc     Colon polyps Father Mc     Cancer Father Mc     Cancer Paternal Grandmother Delaney     Cancer Paternal Grandmother Delaney     Hyperlipidemia Mother [place]     Asthma Mother [place]         No Known Allergies     Social History     Tobacco Use   Smoking Status Never   Smokeless Tobacco Never        Social History     Substance and Sexual Activity   Alcohol Use Not Currently        HOME MEDICATIONS  Current Outpatient Medications   Medication Instructions    b complex 0.4 mg tablet 1 tablet, Daily    buPROPion XL (WELLBUTRIN XL) 150 mg, oral, Every morning, Do not crush, chew, or split.    cholecalciferol (VITAMIN D-3) 25 mcg, Daily    cloNIDine (CATAPRES) 0.1 mg, oral, 2 times daily    drospirenone, contraceptive, (Slynd) 4 mg (28) tablet 1 tablet, oral, Daily    estradiol (Vagifem) 10 mcg tablet vaginal tablet Insert 1 tablet into vagina at bedtime for 2 weeks, then at bedtime  "twice a week.    fluticasone (Flonase) 50 mcg/actuation nasal spray 1 spray, Daily    spironolactone (Aldactone) 50 mg tablet 1 tablet, Daily (0630)    Synthroid 75 mcg, oral, Daily    tirzepatide (weight loss) (ZEPBOUND) 7.5 mg, subcutaneous, Every 7 days    Veozah 45 mg, oral, Daily          OBJECTIVE   Last Recorded Vitals.  Blood pressure 113/80, pulse 85, height 1.626 m (5' 4\"), weight 108 kg (237 lb), SpO2 97%.     PHYSICAL EXAM  Physical Exam   Previous exam:  General: Well-developed, well-nourished and in no acute distress.  Head: Normocephalic. Atraumatic.  Neck/thyroid: Neck is supple.   Eyes: Pupils equal round and reactive to light. Conjunctiva normal.  ENMT: No masses or deformity of external nose. External ears without masses.  Respiratory/Chest:  Normal respiratory effort.  Breast: s/p bilateral mastectomy with implants in place.  Incisions are healing well.  There is no erythema or tissue necrosis.   No palpable lumps or masses.  Implants seem somewhat \"deflated\".  Lymphatics: No palpable lymphadenopathy of the cervical, supraclavicular or axillary regions.  Cardiovascular: Regular rate and rhythm.   Abdomen: Soft, nontender, nondistended.   Musculoskeletal: Joints and limbs are grossly normal. Normal gait. Normal range of motion of major joints.  Neuro: Oriented to person, place and time. No obvious neurological deficit. Motor strength grossly normal.  Psych: Normal mood and affect.    RESULTS   Labs  No results found for this or any previous visit (from the past 24 hours).    Radiology Resutls      Pathology  FINAL DIAGNOSIS   A. Right breast calcifications, stereotactic-guided core biopsy:  -- Ductal carcinoma in situ, cribriform and papillary patterns, intermediate to high nuclear grade with focal associated microcalcifications, see note.      Note: Immunohistochemical stains for CK5/6 and estrogen receptor are consistent with an atypical proliferation. Multiple deeper levels were reviewed. ER " will be reported in an addendum.          peb   Electronically signed by Zacarias Kumar DO on 4/24/2024 at 1123      [facility]   By the signature on this report, the individual or group listed as making the Final Interpretation/Diagnosis certifies that they have reviewed this case.    Comment  [facility]   Case was shown at Breast Case Review Conference via Zoom meeting with concordance    Addendum   Surgical/Block Number:        [id number] A1 and A4  Specimen Site:         Right breast calcifications  Specimen Type:       Core biopsy     Estrogen Receptor (clone SP1):                     Positive                                                                           Percentage with nuclear staining: >95%                                                                             Surgical Pathology  FINAL DIAGNOSIS   A.  Lymph node, right sentinel #1, biopsy:  -- One lymph node negative for carcinoma (0/1)     B.  Breast, right, mastectomy:  --Ductal carcinoma in situ (DCIS), nuclear grade 2-3, cribriform, flat, micropapillary and solid types, with focal calcifications; please see synoptic summary  --DCIS involves intraductal papilloma  --Skin and nipple with no significant histopathologic abnormalities     Note: Immunohistochemical studies have been performed.  P63 and SMMHC are positive in myoepithelial cells.  Pancytokeratin A1/3 is positive in epithelial cells.     This case (B4) has been reviewed at the [facility] breast pathology consensus conference via Zoom on 8/6/24.  Attending: RAEJEV Kumar DO, JOHN Rojas MD, HARMAN Evans MD, ROYCE Abad MD     C.  Lymph node, right sentinel #2, biopsy:  -- One lymph node negative for carcinoma (0/1)     D.  Lymph node, right sentinel #3, biopsy:  -- One lymph node negative for carcinoma (0/1)     E.  Breast, left, mastectomy:  --Breast parenchyma with small fibroadenoma, fibrocystic changes and usual ductal hyperplasia  --Skin and nipple with mild chronic inflammation    Electronically signed by Bang Ortiz MD on 8/7/2024 at 1518      [facility]       ASSESSMENT / PLAN   Diagnoses and all orders for this visit:  Ductal carcinoma in situ (DCIS) of right breast  S/P breast reconstruction, bilateral  -     Referral to Plastic Surgery; Future          Plan  April 2024: RIGHT; retroareolar calc; DCIS; ER+; MRI suggests a 6cm area of involvement including the base of the nipple; s/p bilateral mastectomy with right SLNB and stage I reconstructive surgery; pathology did not identify any invasive carcinoma    1.  Clinically no evidence of any recurrent cancer.  2.  With her younger age at the time of diagnosis of DCIS, she was referred to genetics.  Genetic testing negative for BRCA gene.  3.  She continues to have some concerns about her reconstruction and feels that her implants or wide apart and very flat.  She did see a plastic surgeon in [place], [place] for a second opinion who had reportedly recommended weight loss with a possible follow-up mammoplasty surgery.  She is looking for another opinion, and will refer to plastic surgery at [facility] (Dr. Johnson).  4.  Since she is status post bilateral mastectomy, no mammograms are needed.  5.  Although her DCIS is ER positive, she is status post bilateral mastectomy.  No invasive cancer identified in the final pathology.  Therefore, endocrine therapy is not indicated.  6.  She will follow-up in the office every 6 months for the first 2 years, then yearly.  Follow-up in the office in 6 months for routine follow-up of the right breast DCIS.      Barbara Campos MD, FACS   [facility]  [address] [place] [address];   [address]  [place]  [address] [phone]

## 2025-08-07 LAB
ALBUMIN SERPL-MCNC: 4.5 G/DL (ref 3.6–5.1)
ALBUMIN/CREAT UR: 2 MG/G CREAT
ALP SERPL-CCNC: 50 U/L (ref 31–125)
ALT SERPL-CCNC: 16 U/L (ref 6–29)
ANION GAP SERPL CALCULATED.4IONS-SCNC: 7 MMOL/L (CALC) (ref 7–17)
AST SERPL-CCNC: 16 U/L (ref 10–30)
BILIRUB SERPL-MCNC: 0.5 MG/DL (ref 0.2–1.2)
BUN SERPL-MCNC: 16 MG/DL (ref 7–25)
CALCIUM SERPL-MCNC: 9.4 MG/DL (ref 8.6–10.2)
CHLORIDE SERPL-SCNC: 104 MMOL/L (ref 98–110)
CHOLEST SERPL-MCNC: 203 MG/DL
CHOLEST/HDLC SERPL: 4.6 (CALC)
CO2 SERPL-SCNC: 25 MMOL/L (ref 20–32)
CREAT SERPL-MCNC: 1.04 MG/DL (ref 0.5–0.99)
CREAT UR-MCNC: 91 MG/DL (ref 20–275)
EGFRCR SERPLBLD CKD-EPI 2021: 69 ML/MIN/1.73M2
EST. AVERAGE GLUCOSE BLD GHB EST-MCNC: 120 MG/DL
EST. AVERAGE GLUCOSE BLD GHB EST-SCNC: 6.6 MMOL/L
GLUCOSE SERPL-MCNC: 94 MG/DL (ref 65–99)
HBA1C MFR BLD: 5.8 %
HDLC SERPL-MCNC: 44 MG/DL
LDLC SERPL CALC-MCNC: 132 MG/DL (CALC)
MICROALBUMIN UR-MCNC: 0.2 MG/DL
NONHDLC SERPL-MCNC: 159 MG/DL (CALC)
POTASSIUM SERPL-SCNC: 4.9 MMOL/L (ref 3.5–5.3)
PROT SERPL-MCNC: 7.4 G/DL (ref 6.1–8.1)
SODIUM SERPL-SCNC: 136 MMOL/L (ref 135–146)
TRIGL SERPL-MCNC: 152 MG/DL
TSH SERPL-ACNC: 1.68 MIU/L

## 2025-08-08 DIAGNOSIS — N93.9 ABNORMAL UTERINE BLEEDING (AUB): ICD-10-CM

## 2025-08-08 RX ORDER — DROSPIRENONE 4 MG/1
4 TABLET, FILM COATED ORAL DAILY
Qty: 90 TABLET | Refills: 3 | Status: SHIPPED | OUTPATIENT
Start: 2025-08-08

## 2025-08-12 ENCOUNTER — APPOINTMENT (OUTPATIENT)
Dept: PRIMARY CARE | Facility: CLINIC | Age: 40
End: 2025-08-12
Payer: COMMERCIAL

## 2025-08-12 VITALS
OXYGEN SATURATION: 98 % | HEIGHT: 64 IN | WEIGHT: 239.2 LBS | HEART RATE: 96 BPM | SYSTOLIC BLOOD PRESSURE: 114 MMHG | DIASTOLIC BLOOD PRESSURE: 80 MMHG | BODY MASS INDEX: 40.84 KG/M2

## 2025-08-12 DIAGNOSIS — E03.9 PRIMARY HYPOTHYROIDISM: ICD-10-CM

## 2025-08-12 DIAGNOSIS — F43.23 SITUATIONAL MIXED ANXIETY AND DEPRESSIVE DISORDER: ICD-10-CM

## 2025-08-12 DIAGNOSIS — R73.02 IGT (IMPAIRED GLUCOSE TOLERANCE): ICD-10-CM

## 2025-08-12 DIAGNOSIS — D05.11 DUCTAL CARCINOMA IN SITU (DCIS) OF RIGHT BREAST: ICD-10-CM

## 2025-08-12 DIAGNOSIS — N95.1 MENOPAUSAL VASOMOTOR SYNDROME: ICD-10-CM

## 2025-08-12 DIAGNOSIS — E66.813 CLASS 3 SEVERE OBESITY DUE TO EXCESS CALORIES WITH SERIOUS COMORBIDITY AND BODY MASS INDEX (BMI) OF 40.0 TO 44.9 IN ADULT: ICD-10-CM

## 2025-08-12 DIAGNOSIS — Z00.00 ENCOUNTER FOR PREVENTATIVE ADULT HEALTH CARE EXAMINATION: Primary | ICD-10-CM

## 2025-08-12 PROCEDURE — 3008F BODY MASS INDEX DOCD: CPT | Performed by: INTERNAL MEDICINE

## 2025-08-12 PROCEDURE — 99396 PREV VISIT EST AGE 40-64: CPT | Performed by: INTERNAL MEDICINE

## 2025-08-12 PROCEDURE — 3074F SYST BP LT 130 MM HG: CPT | Performed by: INTERNAL MEDICINE

## 2025-08-12 PROCEDURE — 3079F DIAST BP 80-89 MM HG: CPT | Performed by: INTERNAL MEDICINE

## 2025-08-12 PROCEDURE — 1036F TOBACCO NON-USER: CPT | Performed by: INTERNAL MEDICINE

## 2025-08-12 RX ORDER — LEVOTHYROXINE SODIUM 75 UG/1
75 TABLET ORAL DAILY
Qty: 90 TABLET | Refills: 3 | Status: SHIPPED | OUTPATIENT
Start: 2025-08-12

## 2025-08-12 RX ORDER — BUPROPION HYDROCHLORIDE 150 MG/1
150 TABLET ORAL EVERY MORNING
Qty: 90 TABLET | Refills: 3 | Status: SHIPPED | OUTPATIENT
Start: 2025-08-12 | End: 2026-08-12

## 2025-08-13 ENCOUNTER — APPOINTMENT (OUTPATIENT)
Dept: PRIMARY CARE | Facility: CLINIC | Age: 40
End: 2025-08-13
Payer: COMMERCIAL

## 2025-08-27 ENCOUNTER — PATIENT MESSAGE (OUTPATIENT)
Dept: PRIMARY CARE | Facility: CLINIC | Age: 40
End: 2025-08-27
Payer: COMMERCIAL

## 2025-08-27 DIAGNOSIS — E66.812 CLASS 2 OBESITY DUE TO EXCESS CALORIES WITHOUT SERIOUS COMORBIDITY WITH BODY MASS INDEX (BMI) OF 39.0 TO 39.9 IN ADULT: ICD-10-CM

## 2025-08-27 DIAGNOSIS — E66.09 CLASS 2 OBESITY DUE TO EXCESS CALORIES WITHOUT SERIOUS COMORBIDITY WITH BODY MASS INDEX (BMI) OF 39.0 TO 39.9 IN ADULT: ICD-10-CM

## 2025-09-02 ENCOUNTER — OFFICE VISIT (OUTPATIENT)
Dept: PAIN MEDICINE | Facility: HOSPITAL | Age: 40
End: 2025-09-02
Payer: COMMERCIAL

## 2025-09-02 ENCOUNTER — TELEPHONE (OUTPATIENT)
Dept: PAIN MEDICINE | Facility: HOSPITAL | Age: 40
End: 2025-09-02
Payer: COMMERCIAL

## 2025-09-02 VITALS
HEIGHT: 64 IN | BODY MASS INDEX: 40.8 KG/M2 | HEART RATE: 94 BPM | OXYGEN SATURATION: 97 % | DIASTOLIC BLOOD PRESSURE: 89 MMHG | RESPIRATION RATE: 16 BRPM | WEIGHT: 239 LBS | SYSTOLIC BLOOD PRESSURE: 131 MMHG

## 2025-09-02 DIAGNOSIS — M79.2 NEUROPATHIC PAIN: ICD-10-CM

## 2025-09-02 DIAGNOSIS — M53.3 COCCYDYNIA: Primary | ICD-10-CM

## 2025-09-02 PROCEDURE — 3075F SYST BP GE 130 - 139MM HG: CPT | Performed by: CLINICAL NURSE SPECIALIST

## 2025-09-02 PROCEDURE — 3079F DIAST BP 80-89 MM HG: CPT | Performed by: CLINICAL NURSE SPECIALIST

## 2025-09-02 PROCEDURE — 99214 OFFICE O/P EST MOD 30 MIN: CPT | Performed by: CLINICAL NURSE SPECIALIST

## 2025-09-02 PROCEDURE — 99212 OFFICE O/P EST SF 10 MIN: CPT | Performed by: CLINICAL NURSE SPECIALIST

## 2025-09-02 PROCEDURE — 1036F TOBACCO NON-USER: CPT | Performed by: CLINICAL NURSE SPECIALIST

## 2025-09-02 PROCEDURE — 3008F BODY MASS INDEX DOCD: CPT | Performed by: CLINICAL NURSE SPECIALIST

## 2025-09-02 RX ORDER — TOPIRAMATE 25 MG/1
25 TABLET, FILM COATED ORAL DAILY
Qty: 30 TABLET | Refills: 1 | Status: SHIPPED | OUTPATIENT
Start: 2025-09-02 | End: 2025-10-02

## 2025-09-02 SDOH — SOCIAL STABILITY: SOCIAL NETWORK: SOCIAL ACTIVITY:: 1

## 2025-09-02 ASSESSMENT — ENCOUNTER SYMPTOMS
OCCASIONAL FEELINGS OF UNSTEADINESS: 0
LOSS OF SENSATION IN FEET: 0
DEPRESSION: 0

## 2025-09-12 ENCOUNTER — APPOINTMENT (OUTPATIENT)
Dept: OBSTETRICS AND GYNECOLOGY | Facility: CLINIC | Age: 40
End: 2025-09-12
Payer: COMMERCIAL

## 2025-09-15 ENCOUNTER — APPOINTMENT (OUTPATIENT)
Dept: PHARMACY | Facility: HOSPITAL | Age: 40
End: 2025-09-15
Payer: COMMERCIAL

## 2025-09-16 ENCOUNTER — APPOINTMENT (OUTPATIENT)
Dept: PLASTIC SURGERY | Facility: CLINIC | Age: 40
End: 2025-09-16
Payer: COMMERCIAL

## 2026-02-06 ENCOUNTER — APPOINTMENT (OUTPATIENT)
Dept: SURGERY | Facility: CLINIC | Age: 41
End: 2026-02-06
Payer: COMMERCIAL

## 2026-02-12 ENCOUNTER — APPOINTMENT (OUTPATIENT)
Dept: PRIMARY CARE | Facility: CLINIC | Age: 41
End: 2026-02-12
Payer: COMMERCIAL

## 2026-07-17 ENCOUNTER — APPOINTMENT (OUTPATIENT)
Dept: OBSTETRICS AND GYNECOLOGY | Facility: CLINIC | Age: 41
End: 2026-07-17
Payer: COMMERCIAL

## (undated) DEVICE — DRESSING, MEPILEX, BORDER FLEX, 6 X 8

## (undated) DEVICE — Device

## (undated) DEVICE — TIP, SUCTION, YANKAUER, BULB, ADULT

## (undated) DEVICE — SLEEVE, SUCTION, E-SEP, 165MM

## (undated) DEVICE — ANGLED WIRELESS GAMMA PROBE COVER

## (undated) DEVICE — SUTURE, ETHIBOND, XTRA, 30 IN, 0, CT-1, GREEN

## (undated) DEVICE — SYRINGE, 60 CC, IRRIGATION, BULB, CONTRO-BULB, PAPER POUCH

## (undated) DEVICE — ELECTRODE, ELECTROSURGICAL, BLADE EXT 4 INCH, INSULATED

## (undated) DEVICE — WIPE, BARRIER, PROTECTIVE, ALLKARE, 50-86F

## (undated) DEVICE — SYRINGE, 50 CC, LUER LOCK

## (undated) DEVICE — STRIP, SKIN CLOSURE, STERI STRIP, REINFORCED, 0.5 X 4 IN

## (undated) DEVICE — DRESSING, PREVENA, PEEL AND PLACE, 13CM

## (undated) DEVICE — IRRIGATION SET, CYSTOSCOPY, REGULATING CLAMP, STRAIGHT, 81 IN

## (undated) DEVICE — GOWN, SURGICAL, IMPERVIOUS, BREATHABLE, XXLARGE

## (undated) DEVICE — RADIOGRAPHY DEVICE, SPECIMEN, TRANSPEC

## (undated) DEVICE — DRAIN, WOUND, FLAT, HUBLESS, 0.75 PERFORATION, 10 MM X 20 CM, SILICONE

## (undated) DEVICE — ELECTRODE, ELECTROSURGICAL, BLADE, EXTENDED

## (undated) DEVICE — GLOVE, PROTEXIS PI CLASSIC, SZ-8.0, PF, PF, LF

## (undated) DEVICE — ELECTRODE, ELECTROSURGICAL, BLADE, INSULATED, ENT/IMA, STERILE

## (undated) DEVICE — TUBING, INFILTRATION PUMP, K-PUMP, DOUBLE SPIKED

## (undated) DEVICE — SYRINGE, HYPODERMIC, CONTROL, LUER LOCK, 10 CC, PLASTIC, STERILE

## (undated) DEVICE — CONNECTOR, Y F/ WOUND VACUUM STRL

## (undated) DEVICE — APPLICATOR, FOAM BARRIER, LARGE

## (undated) DEVICE — STRAW, FILTER, 5 MIC, 1.7 IN

## (undated) DEVICE — THERAPY UNIT, 14-DAY PREVENA PLUS 125

## (undated) DEVICE — DRESSING, GAUZE, SPONGE, VERSALON, ALL PURPOSE, 4 X 4 IN, SOFT

## (undated) DEVICE — NEEDLE, HYPODERMIC, REGULAR WALL, REGULAR BEVEL, 22 G X 1.5 IN

## (undated) DEVICE — SUTURE, ETHILON, 2-0, 18 IN, FS, BLACK, BX/12

## (undated) DEVICE — BANDAGE, GAUZE, CONFORMING, KERLIX, 6 PLY, 4.5 IN X 4.1 YD

## (undated) DEVICE — MARKER, SKIN, DUAL TIP INK W/9 LABEL AND REMOVABLE TIME OUT SLEEVE

## (undated) DEVICE — SOLUTION, IRRIGATION, STERILE WATER, 1000 ML, POUR BOTTLE

## (undated) DEVICE — PAD, GROUNDING, ELECTROSURGICAL, W/9 FT CABLE, POLYHESIVE II, ADULT, LF

## (undated) DEVICE — CLIP, LIGATING, HORIZON, MEDIUM, TITANIUM

## (undated) DEVICE — SUTURE, MONOCRYL, 3-0, 27 IN, SH, UNDYED

## (undated) DEVICE — STAPLER, SKIN, MULTIFIRE, PREMIUM, WIDE, 35 W

## (undated) DEVICE — FRAZIER SUCTION, 10 FR.

## (undated) DEVICE — GLOVE, PROTEXIS PI CLASSIC, SZ-7.0, PF, LF

## (undated) DEVICE — SUTURE, VICRYL, 3-0, 27 IN, SH

## (undated) DEVICE — SUTURE, SILK, 3-0, 30 IN, BR SH, BLACK

## (undated) DEVICE — STRIP, SKIN CLOSURE, STERI-STRIP, REINFORCED, 0.25 X 4 IN

## (undated) DEVICE — TOWEL PACK, STERILE, 4/PACK, BLUE

## (undated) DEVICE — SYRINGE, 20 CC, LUER LOCK

## (undated) DEVICE — SUTURE, VICRYL, 2-0, 27 IN, SH, UNDYED

## (undated) DEVICE — SYRINGE, 10 CC, LUER LOCK

## (undated) DEVICE — APPLICATOR, CHLORAPREP, W/ORANGE TINT, 26ML

## (undated) DEVICE — DRESSING, ABD PAD, TENDERSORB, 7.5 X 8 IN, NS

## (undated) DEVICE — CONTAINER, SPECIMEN, 120ML

## (undated) DEVICE — DRAPE PACK, MAJOR, CUSTOM, GEAUGA

## (undated) DEVICE — DRESSING, ABDOMINAL, WET PRUF, TENDERSORB, 5 X 9 IN, STERILE

## (undated) DEVICE — TAPE, CLOTH, HYPOALLERGENIC, MEDIPORE HI SOFT, 6 IN X 10 YD

## (undated) DEVICE — SUTURE, MONOCRYL, 4-0, 18 IN, PS2, UNDYED

## (undated) DEVICE — SYRINGE, 5 CC, LUER LOCK

## (undated) DEVICE — PACK, UNIVERSAL

## (undated) DEVICE — TUBING, NON COND, 6MM X 20

## (undated) DEVICE — SUTURE, VICRYL, 4-0, 18 IN, UNDYED BR PS-2

## (undated) DEVICE — EVACUATOR, WOUND, SUCTION, CLOSED, JACKSON-PRATT, 100 CC, SILICONE

## (undated) DEVICE — COLLECTION SET, VACURETTE, BERKLEY, 6 FT

## (undated) DEVICE — COVER HANDLE LIGHT, STERIS, BLUE, STERILE

## (undated) DEVICE — DRAPE, SHEET, ENDOSCOPY, GENERAL, FENESTRATED, ARMBOARD COVER, 98 X 123.5 IN, DISPOSABLE, LF, STERILE

## (undated) DEVICE — SOLUTION, INJECTION, USP, SODIUM CHLORIDE 0.9%, .9, NACL, 1000 ML, BAG

## (undated) DEVICE — NEEDLE, SAFETY, 22 G X 1.5 IN

## (undated) DEVICE — DRAPE, SHEET, FAN FOLDED, HALF, 44 X 58 IN, DISPOSABLE, LF, STERILE

## (undated) DEVICE — CONNECTOR, 5 IN 1, STERILE

## (undated) DEVICE — GOWN, SURGICAL, SMARTGOWN, XLARGE, STERILE

## (undated) DEVICE — SPONGE, LAP, XRAY DECT, 18IN X 18IN, W/MASTER DMT, STERILE